# Patient Record
Sex: MALE | Race: WHITE | NOT HISPANIC OR LATINO | URBAN - METROPOLITAN AREA
[De-identification: names, ages, dates, MRNs, and addresses within clinical notes are randomized per-mention and may not be internally consistent; named-entity substitution may affect disease eponyms.]

---

## 2017-08-29 ENCOUNTER — OUTPATIENT (OUTPATIENT)
Dept: OUTPATIENT SERVICES | Facility: HOSPITAL | Age: 62
LOS: 1 days | Discharge: HOME | End: 2017-08-29
Payer: COMMERCIAL

## 2017-08-29 VITALS
HEIGHT: 70 IN | RESPIRATION RATE: 16 BRPM | TEMPERATURE: 97 F | HEART RATE: 70 BPM | OXYGEN SATURATION: 97 % | WEIGHT: 294.98 LBS | SYSTOLIC BLOOD PRESSURE: 144 MMHG | DIASTOLIC BLOOD PRESSURE: 72 MMHG

## 2017-08-29 VITALS
RESPIRATION RATE: 16 BRPM | SYSTOLIC BLOOD PRESSURE: 110 MMHG | DIASTOLIC BLOOD PRESSURE: 58 MMHG | HEART RATE: 68 BPM | OXYGEN SATURATION: 97 %

## 2017-08-29 DIAGNOSIS — Z90.49 ACQUIRED ABSENCE OF OTHER SPECIFIED PARTS OF DIGESTIVE TRACT: Chronic | ICD-10-CM

## 2017-08-29 DIAGNOSIS — Z98.890 OTHER SPECIFIED POSTPROCEDURAL STATES: Chronic | ICD-10-CM

## 2017-08-29 PROCEDURE — 93458 L HRT ARTERY/VENTRICLE ANGIO: CPT | Mod: 26

## 2017-08-29 NOTE — H&P CARDIOLOGY - HISTORY OF PRESENT ILLNESS
62 year old male with PMHx HTN, HLD, MI, PCI of RCA 2008 presented to cardiologist with MOTT- working [place]-, fatigue. Stress test revealed 62 year old male with PMHx HTN, HLD, former smoker, MI, PCI of RCA 2008 presented to cardiologist for routine visit. Reports MOTT- walking [place]- and fatigue. Dyspnea during  Nuclear stress test which revealed reversible defect of the inferior wall consistent with ischemia

## 2017-08-29 NOTE — PROGRESS NOTE ADULT - SUBJECTIVE AND OBJECTIVE BOX
Post cardiac cath NP note    s/p LHC revealing non obstructive CAD    Patient feels well.  Denies chest pain, shortness of breath, dizziness or palpitations at this time    Right radial hemoband in place.  Procedure site CDI, no bleeding, no hematoma, able to move all digits with capillary refill < 3 seconds, fingers warm      HPI:  62 year old male with PMHx HTN, HLD, former smoker, MI, PCI of RCA 2008 presented to cardiologist for routine visit. Reports MOTT- walking [place]- and fatigue. Dyspnea during  Nuclear stress test which revealed reversible defect of the inferior wall consistent with ischemia     now s/p LHC revealing non obstructive CAD and patent stent    --vital signs, diet and activity per post procedure orders  -ambulae ad darrion post bedrest  -encourage PO fluids  -plan of care discussed with patient, family and MD  -d/c after bedrest if stable  -post procedure and d/c instructions reviewed  -follow up with MD in 1-2 weeks  -Discussed therapeutic lifestyle changes to reduce risk factors such as following a cardiac diet, weight loss, maintaining a healthy weight, exercise, smoking cessation, medication compliance, and regular follow-up  with MD Post cardiac cath NP note    s/p LHC revealing non obstructive CAD    Patient feels well.  Denies chest pain, shortness of breath, dizziness or palpitations at this time    Right radial hemoband removed.  Procedure site CDI, no bleeding, no hematoma, able to move all digits with capillary refill < 2 seconds, fingers warm      HPI:  62 year old male with PMHx HTN, HLD, former smoker, MI, PCI of RCA 2008 presented to cardiologist for routine visit. Reports MOTT- walking [place]- and fatigue. Dyspnea during  Nuclear stress test which revealed reversible defect of the inferior wall consistent with ischemia     now s/p LHC revealing non obstructive CAD and patent stent    --vital signs, diet and activity per post procedure orders  -ambulae ad darrion post bedrest  -encourage PO fluids  -plan of care discussed with patient, family and MD  -d/c after bedrest if stable  -post procedure and d/c instructions reviewed  -follow up with MD in 1-2 weeks  -Discussed therapeutic lifestyle changes to reduce risk factors such as following a cardiac diet, weight loss, maintaining a healthy weight, exercise, smoking cessation, medication compliance, and regular follow-up  with MD

## 2017-08-29 NOTE — H&P CARDIOLOGY - PMH
CAD (coronary artery disease)    HLD (hyperlipidemia)    HTN (hypertension)    MI, old CAD (coronary artery disease)    HLD (hyperlipidemia)    HTN (hypertension)    MI, old    LEA (obstructive sleep apnea)

## 2017-08-29 NOTE — ASU PATIENT PROFILE, ADULT - PSH
H/O Achilles tendon repair    History of cholecystectomy    History of percutaneous coronary intervention  RCA stent 2008

## 2017-08-29 NOTE — PACU DISCHARGE NOTE - COMMENTS
Pt received instructions on medications, activity, follow up and signs and symptoms to report.  Pt and his wife verbalizes understanding. m/l removed. site clear.  bulky dressing intact. no bleeding or hematoma.  Pt left ambulating with his wife and cl staff by his side. No c/o offered.

## 2017-09-06 DIAGNOSIS — I25.2 OLD MYOCARDIAL INFARCTION: ICD-10-CM

## 2017-09-06 DIAGNOSIS — I10 ESSENTIAL (PRIMARY) HYPERTENSION: ICD-10-CM

## 2017-09-06 DIAGNOSIS — R94.39 ABNORMAL RESULT OF OTHER CARDIOVASCULAR FUNCTION STUDY: ICD-10-CM

## 2017-09-06 DIAGNOSIS — E78.5 HYPERLIPIDEMIA, UNSPECIFIED: ICD-10-CM

## 2017-09-06 DIAGNOSIS — Z87.891 PERSONAL HISTORY OF NICOTINE DEPENDENCE: ICD-10-CM

## 2017-09-06 DIAGNOSIS — G47.33 OBSTRUCTIVE SLEEP APNEA (ADULT) (PEDIATRIC): ICD-10-CM

## 2017-09-06 DIAGNOSIS — Z90.49 ACQUIRED ABSENCE OF OTHER SPECIFIED PARTS OF DIGESTIVE TRACT: ICD-10-CM

## 2017-09-06 DIAGNOSIS — I25.10 ATHEROSCLEROTIC HEART DISEASE OF NATIVE CORONARY ARTERY WITHOUT ANGINA PECTORIS: ICD-10-CM

## 2017-09-06 DIAGNOSIS — Z79.82 LONG TERM (CURRENT) USE OF ASPIRIN: ICD-10-CM

## 2018-01-12 ENCOUNTER — OUTPATIENT (OUTPATIENT)
Dept: OUTPATIENT SERVICES | Facility: HOSPITAL | Age: 63
LOS: 1 days | Discharge: HOME | End: 2018-01-12

## 2018-01-12 VITALS
OXYGEN SATURATION: 96 % | WEIGHT: 299.83 LBS | DIASTOLIC BLOOD PRESSURE: 87 MMHG | TEMPERATURE: 98 F | RESPIRATION RATE: 16 BRPM | HEIGHT: 70 IN | SYSTOLIC BLOOD PRESSURE: 155 MMHG | HEART RATE: 70 BPM

## 2018-01-12 DIAGNOSIS — Z98.890 OTHER SPECIFIED POSTPROCEDURAL STATES: Chronic | ICD-10-CM

## 2018-01-12 DIAGNOSIS — Z90.49 ACQUIRED ABSENCE OF OTHER SPECIFIED PARTS OF DIGESTIVE TRACT: Chronic | ICD-10-CM

## 2018-01-12 NOTE — ASU PATIENT PROFILE, ADULT - PMH
CAD (coronary artery disease)    HLD (hyperlipidemia)    HTN (hypertension)    MI, old    LEA (obstructive sleep apnea)

## 2018-01-16 DIAGNOSIS — Z09 ENCOUNTER FOR FOLLOW-UP EXAMINATION AFTER COMPLETED TREATMENT FOR CONDITIONS OTHER THAN MALIGNANT NEOPLASM: ICD-10-CM

## 2018-01-16 DIAGNOSIS — Z87.891 PERSONAL HISTORY OF NICOTINE DEPENDENCE: ICD-10-CM

## 2018-01-16 DIAGNOSIS — I25.2 OLD MYOCARDIAL INFARCTION: ICD-10-CM

## 2018-01-16 DIAGNOSIS — I10 ESSENTIAL (PRIMARY) HYPERTENSION: ICD-10-CM

## 2018-01-16 DIAGNOSIS — K57.30 DIVERTICULOSIS OF LARGE INTESTINE WITHOUT PERFORATION OR ABSCESS WITHOUT BLEEDING: ICD-10-CM

## 2018-01-16 DIAGNOSIS — E78.5 HYPERLIPIDEMIA, UNSPECIFIED: ICD-10-CM

## 2018-01-16 DIAGNOSIS — K64.8 OTHER HEMORRHOIDS: ICD-10-CM

## 2018-01-16 DIAGNOSIS — G47.33 OBSTRUCTIVE SLEEP APNEA (ADULT) (PEDIATRIC): ICD-10-CM

## 2018-01-16 DIAGNOSIS — Z90.49 ACQUIRED ABSENCE OF OTHER SPECIFIED PARTS OF DIGESTIVE TRACT: ICD-10-CM

## 2018-01-16 DIAGNOSIS — Z86.010 PERSONAL HISTORY OF COLONIC POLYPS: ICD-10-CM

## 2018-01-16 DIAGNOSIS — E66.01 MORBID (SEVERE) OBESITY DUE TO EXCESS CALORIES: ICD-10-CM

## 2018-10-01 NOTE — ASU PREOP CHECKLIST - HEIGHT IN INCHES
[FreeTextEntry1] : Health care maintenance:\par check blood work, agrees for STD testing, advised to see an outside provider if he wishes for anal or oral swabs for HPV\par follow up with optometry/ophthalmology and dentist for routine exams\par c/w diet and exercise as tolerated\par Flu vaccine given, patient tolerated well \par \par Asthma: \par stable, c/w Albuterol inhaler as directed\par \par Dermatitis:\par see dermatology\par \par Vitamin D deficiency:\par check blood work\par 
10

## 2019-10-26 ENCOUNTER — INPATIENT (INPATIENT)
Facility: HOSPITAL | Age: 64
LOS: 1 days | Discharge: HOME | DRG: 299 | End: 2019-10-28
Attending: FAMILY MEDICINE | Admitting: FAMILY MEDICINE
Payer: COMMERCIAL

## 2019-10-26 VITALS — WEIGHT: 278 LBS | HEIGHT: 70 IN

## 2019-10-26 DIAGNOSIS — Z90.49 ACQUIRED ABSENCE OF OTHER SPECIFIED PARTS OF DIGESTIVE TRACT: ICD-10-CM

## 2019-10-26 DIAGNOSIS — Z90.49 ACQUIRED ABSENCE OF OTHER SPECIFIED PARTS OF DIGESTIVE TRACT: Chronic | ICD-10-CM

## 2019-10-26 DIAGNOSIS — Z98.890 OTHER SPECIFIED POSTPROCEDURAL STATES: Chronic | ICD-10-CM

## 2019-10-26 DIAGNOSIS — I26.99 OTHER PULMONARY EMBOLISM WITHOUT ACUTE COR PULMONALE: ICD-10-CM

## 2019-10-26 PROBLEM — I25.10 ATHEROSCLEROTIC HEART DISEASE OF NATIVE CORONARY ARTERY WITHOUT ANGINA PECTORIS: Chronic | Status: ACTIVE | Noted: 2017-08-29

## 2019-10-26 PROBLEM — E78.5 HYPERLIPIDEMIA, UNSPECIFIED: Chronic | Status: ACTIVE | Noted: 2017-08-29

## 2019-10-26 PROBLEM — I10 ESSENTIAL (PRIMARY) HYPERTENSION: Chronic | Status: ACTIVE | Noted: 2017-08-29

## 2019-10-26 LAB
ALBUMIN SERPL ELPH-MCNC: 4.1 G/DL — SIGNIFICANT CHANGE UP (ref 3.3–5)
ALP SERPL-CCNC: 68 U/L — SIGNIFICANT CHANGE UP (ref 40–120)
ALT FLD-CCNC: 57 U/L — SIGNIFICANT CHANGE UP (ref 12–78)
ANION GAP SERPL CALC-SCNC: 11 MMOL/L — SIGNIFICANT CHANGE UP (ref 5–17)
APTT BLD: 23.9 SEC — LOW (ref 27.5–36.3)
AST SERPL-CCNC: 18 U/L — SIGNIFICANT CHANGE UP (ref 15–37)
BILIRUB SERPL-MCNC: 2.3 MG/DL — HIGH (ref 0.2–1.2)
BUN SERPL-MCNC: 30 MG/DL — HIGH (ref 7–23)
CALCIUM SERPL-MCNC: 9.3 MG/DL — SIGNIFICANT CHANGE UP (ref 8.5–10.1)
CHLORIDE SERPL-SCNC: 101 MMOL/L — SIGNIFICANT CHANGE UP (ref 96–108)
CO2 SERPL-SCNC: 25 MMOL/L — SIGNIFICANT CHANGE UP (ref 22–31)
CREAT SERPL-MCNC: 1.01 MG/DL — SIGNIFICANT CHANGE UP (ref 0.5–1.3)
GLUCOSE SERPL-MCNC: 137 MG/DL — HIGH (ref 70–99)
HCT VFR BLD CALC: 48.1 % — SIGNIFICANT CHANGE UP (ref 39–50)
HGB BLD-MCNC: 16.9 G/DL — SIGNIFICANT CHANGE UP (ref 13–17)
INR BLD: 1.03 RATIO — SIGNIFICANT CHANGE UP (ref 0.88–1.16)
MCHC RBC-ENTMCNC: 31.1 PG — SIGNIFICANT CHANGE UP (ref 27–34)
MCHC RBC-ENTMCNC: 35.1 GM/DL — SIGNIFICANT CHANGE UP (ref 32–36)
MCV RBC AUTO: 88.4 FL — SIGNIFICANT CHANGE UP (ref 80–100)
NT-PROBNP SERPL-SCNC: 54 PG/ML — SIGNIFICANT CHANGE UP (ref 0–125)
PLATELET # BLD AUTO: 239 K/UL — SIGNIFICANT CHANGE UP (ref 150–400)
POTASSIUM SERPL-MCNC: 3.8 MMOL/L — SIGNIFICANT CHANGE UP (ref 3.5–5.3)
POTASSIUM SERPL-SCNC: 3.8 MMOL/L — SIGNIFICANT CHANGE UP (ref 3.5–5.3)
PROT SERPL-MCNC: 7.5 GM/DL — SIGNIFICANT CHANGE UP (ref 6–8.3)
PROTHROM AB SERPL-ACNC: 11.4 SEC — SIGNIFICANT CHANGE UP (ref 10–12.9)
RBC # BLD: 5.44 M/UL — SIGNIFICANT CHANGE UP (ref 4.2–5.8)
RBC # FLD: 12.8 % — SIGNIFICANT CHANGE UP (ref 10.3–14.5)
SODIUM SERPL-SCNC: 137 MMOL/L — SIGNIFICANT CHANGE UP (ref 135–145)
TROPONIN I SERPL-MCNC: <0.015 NG/ML — SIGNIFICANT CHANGE UP (ref 0.01–0.04)
TROPONIN I SERPL-MCNC: <0.015 NG/ML — SIGNIFICANT CHANGE UP (ref 0.01–0.04)
WBC # BLD: 14.19 K/UL — HIGH (ref 3.8–10.5)
WBC # FLD AUTO: 14.19 K/UL — HIGH (ref 3.8–10.5)

## 2019-10-26 PROCEDURE — 71275 CT ANGIOGRAPHY CHEST: CPT

## 2019-10-26 PROCEDURE — 93970 EXTREMITY STUDY: CPT

## 2019-10-26 PROCEDURE — 80048 BASIC METABOLIC PNL TOTAL CA: CPT

## 2019-10-26 PROCEDURE — 93306 TTE W/DOPPLER COMPLETE: CPT

## 2019-10-26 PROCEDURE — 36415 COLL VENOUS BLD VENIPUNCTURE: CPT

## 2019-10-26 PROCEDURE — 84100 ASSAY OF PHOSPHORUS: CPT

## 2019-10-26 PROCEDURE — 82962 GLUCOSE BLOOD TEST: CPT

## 2019-10-26 PROCEDURE — 85027 COMPLETE CBC AUTOMATED: CPT

## 2019-10-26 PROCEDURE — 86803 HEPATITIS C AB TEST: CPT

## 2019-10-26 PROCEDURE — 83735 ASSAY OF MAGNESIUM: CPT

## 2019-10-26 PROCEDURE — 93010 ELECTROCARDIOGRAM REPORT: CPT

## 2019-10-26 PROCEDURE — 85014 HEMATOCRIT: CPT

## 2019-10-26 PROCEDURE — 93005 ELECTROCARDIOGRAM TRACING: CPT

## 2019-10-26 PROCEDURE — 80053 COMPREHEN METABOLIC PANEL: CPT

## 2019-10-26 PROCEDURE — 85018 HEMOGLOBIN: CPT

## 2019-10-26 PROCEDURE — 71046 X-RAY EXAM CHEST 2 VIEWS: CPT | Mod: 26

## 2019-10-26 PROCEDURE — 71275 CT ANGIOGRAPHY CHEST: CPT | Mod: 26

## 2019-10-26 RX ORDER — INSULIN LISPRO 100/ML
VIAL (ML) SUBCUTANEOUS
Refills: 0 | Status: DISCONTINUED | OUTPATIENT
Start: 2019-10-26 | End: 2019-10-28

## 2019-10-26 RX ORDER — ASPIRIN/CALCIUM CARB/MAGNESIUM 324 MG
324 TABLET ORAL ONCE
Refills: 0 | Status: COMPLETED | OUTPATIENT
Start: 2019-10-26 | End: 2019-10-26

## 2019-10-26 RX ORDER — DEXTROSE 50 % IN WATER 50 %
15 SYRINGE (ML) INTRAVENOUS ONCE
Refills: 0 | Status: DISCONTINUED | OUTPATIENT
Start: 2019-10-26 | End: 2019-10-28

## 2019-10-26 RX ORDER — ENOXAPARIN SODIUM 100 MG/ML
125 INJECTION SUBCUTANEOUS
Refills: 0 | Status: DISCONTINUED | OUTPATIENT
Start: 2019-10-26 | End: 2019-10-28

## 2019-10-26 RX ORDER — SIMVASTATIN 20 MG/1
40 TABLET, FILM COATED ORAL AT BEDTIME
Refills: 0 | Status: DISCONTINUED | OUTPATIENT
Start: 2019-10-26 | End: 2019-10-28

## 2019-10-26 RX ORDER — ASPIRIN/CALCIUM CARB/MAGNESIUM 324 MG
325 TABLET ORAL DAILY
Refills: 0 | Status: DISCONTINUED | OUTPATIENT
Start: 2019-10-26 | End: 2019-10-28

## 2019-10-26 RX ORDER — DEXTROSE 10 % IN WATER 10 %
125 INTRAVENOUS SOLUTION INTRAVENOUS ONCE
Refills: 0 | Status: DISCONTINUED | OUTPATIENT
Start: 2019-10-26 | End: 2019-10-28

## 2019-10-26 RX ORDER — TRIAMTERENE/HYDROCHLOROTHIAZID 75 MG-50MG
1 TABLET ORAL DAILY
Refills: 0 | Status: DISCONTINUED | OUTPATIENT
Start: 2019-10-26 | End: 2019-10-28

## 2019-10-26 RX ORDER — METOPROLOL TARTRATE 50 MG
25 TABLET ORAL DAILY
Refills: 0 | Status: DISCONTINUED | OUTPATIENT
Start: 2019-10-26 | End: 2019-10-28

## 2019-10-26 RX ORDER — LATANOPROST 0.05 MG/ML
1 SOLUTION/ DROPS OPHTHALMIC; TOPICAL AT BEDTIME
Refills: 0 | Status: DISCONTINUED | OUTPATIENT
Start: 2019-10-26 | End: 2019-10-28

## 2019-10-26 RX ORDER — GLUCAGON INJECTION, SOLUTION 0.5 MG/.1ML
1 INJECTION, SOLUTION SUBCUTANEOUS ONCE
Refills: 0 | Status: DISCONTINUED | OUTPATIENT
Start: 2019-10-26 | End: 2019-10-28

## 2019-10-26 RX ORDER — ALLOPURINOL 300 MG
1 TABLET ORAL
Qty: 0 | Refills: 0 | DISCHARGE

## 2019-10-26 RX ORDER — INSULIN LISPRO 100/ML
VIAL (ML) SUBCUTANEOUS AT BEDTIME
Refills: 0 | Status: DISCONTINUED | OUTPATIENT
Start: 2019-10-26 | End: 2019-10-28

## 2019-10-26 RX ORDER — DEXTROSE 10 % IN WATER 10 %
250 INTRAVENOUS SOLUTION INTRAVENOUS ONCE
Refills: 0 | Status: DISCONTINUED | OUTPATIENT
Start: 2019-10-26 | End: 2019-10-28

## 2019-10-26 RX ORDER — SODIUM CHLORIDE 9 MG/ML
1000 INJECTION, SOLUTION INTRAVENOUS
Refills: 0 | Status: DISCONTINUED | OUTPATIENT
Start: 2019-10-26 | End: 2019-10-28

## 2019-10-26 RX ADMIN — LATANOPROST 1 DROP(S): 0.05 SOLUTION/ DROPS OPHTHALMIC; TOPICAL at 23:06

## 2019-10-26 RX ADMIN — ENOXAPARIN SODIUM 125 MILLIGRAM(S): 100 INJECTION SUBCUTANEOUS at 17:57

## 2019-10-26 NOTE — ED ADULT NURSE REASSESSMENT NOTE - NS ED NURSE REASSESS COMMENT FT1
Dr. Jose reassessed pt at bedside. pt ambulating steadily in ED at this time. denies CP denies SOB after 2 laps around ED.
Report received from previous RN, patient waiting for hospitalist for admission orders.  Appears comfortable.  Will continue to monitor.
Report received from previous RN, patient with wife at bedside attending to skin tear on right shin.  1 unit PRBC's infusing along with protonix drip.  Will continue to monitor.
Spoke with Dr Bautista, goal oxygen saturation is 90-95%, will titrate oxygen down as tolerated. Will update patient on plan of care.
after ambulation trial; pt became dyspneic off O2. O2 sat decreased to 88 on room air. pt placed back in bed on O2 as ordered. pt denies CP. Awaiting CT.
Assumed care of patient from JESUS Ramachandran at 2040. Patient AxOx4, Dr Bautista at beside. Patient resting comfortably in bed. Patient in no acute signs of distress. All needs addressed at this time. Safety and comfort maintained. Will continue to monitor.

## 2019-10-26 NOTE — H&P ADULT - NSICDXPASTMEDICALHX_GEN_ALL_CORE_FT
PAST MEDICAL HISTORY:  CAD (coronary artery disease)     Demyelinating neuropathy     HLD (hyperlipidemia)     HTN (hypertension)     MI, old     LEA (obstructive sleep apnea)

## 2019-10-26 NOTE — ED ADULT NURSE NOTE - CHPI ED NUR SYMPTOMS NEG
no tingling/no fever/no nausea/no pain/no weakness/no chills/no dizziness/no decreased eating/drinking/no vomiting

## 2019-10-26 NOTE — H&P ADULT - NSICDXPASTSURGICALHX_GEN_ALL_CORE_FT
PAST SURGICAL HISTORY:  H/O Achilles tendon repair     History of cholecystectomy     History of percutaneous coronary intervention RCA stent 2008

## 2019-10-26 NOTE — ED ADULT TRIAGE NOTE - CHIEF COMPLAINT QUOTE
Pt reports that over the past week or so he has been experiencing dyspnea with exertion such as climbing stairs, which is not his normal. Pt denies any SOB at rest. Denies chest pain, fever, weakness.

## 2019-10-26 NOTE — H&P ADULT - NSHPOUTPATIENTPROVIDERS_GEN_ALL_CORE
[facility]/Cardio- Dr. Villafana. PMD/Cardio- Dr. Villafana.  Neurology Dr. Kiara Barahona, [place]  Ortho Dr. Bailey

## 2019-10-26 NOTE — H&P ADULT - ASSESSMENT
Pt is admitted w/  Bilat PE's  HYpoxia to 80s  with air hunger on ambulation  DM  - anticoagulation with lovenox in the ED  - O2 support  -   - DVT prophylaxis : on lovenox for PE  - IMPROVE VTE Individual Risk Assessment    RISK                                                                Points    [  ] Previous VTE                                                  3    [  X] Thrombophilia                                               2    [  ] Lower limb paralysis                                      2        (unable to hold up >15 seconds)      [  ] Current Cancer                                              2         (within 6 months)    [  ] Immobilization > 24 hrs                                1    [  ] ICU/CCU stay > 24 hours                              1    [X  ] Age > 60                                                      1    IMPROVE VTE Score ______>3___    IMPROVE Score 0-1: Low Risk, No VTE prophylaxis required for most patients, encourage ambulation.   IMPROVE Score 2-3: At risk, pharmacologic VTE prophylaxis is indicated for most patients (in the absence of a contraindication)  IMPROVE Score > or = 4: High Risk, pharmacologic VTE prophylaxis is indicated for most patients (in the absence of a contraindication) Pt is a  63 y/o M on intermittent steroids for demyelinating neuropathy admitted w/  Bilat PE's, increasing MOTT  HYpoxia to 80s  with air hunger on ambulation  DM,  Hg A1C 6.2 3-4 mo ago  Sleep Apnea  - anticoagulation with lovenox in the ED  - O2 support and CPAP ovenight  - echocardiogram to eval for R vent strain  - dupplex bilat LE  - cardiology consult  - outpt f/u with oncology  - Insulin sliding scale, hold metformin tonight  - DVT prophylaxis : on lovenox for PE  - IMPROVE VTE Individual Risk Assessment    RISK                                                                Points    [  ] Previous VTE                                                  3    [  X] Thrombophilia                                               2    [  ] Lower limb paralysis                                      2        (unable to hold up >15 seconds)      [  ] Current Cancer                                              2         (within 6 months)    [  ] Immobilization > 24 hrs                                1    [  ] ICU/CCU stay > 24 hours                              1    [X  ] Age > 60                                                      1    IMPROVE VTE Score ______>3___    IMPROVE Score 0-1: Low Risk, No VTE prophylaxis required for most patients, encourage ambulation.   IMPROVE Score 2-3: At risk, pharmacologic VTE prophylaxis is indicated for most patients (in the absence of a contraindication)  IMPROVE Score > or = 4: High Risk, pharmacologic VTE prophylaxis is indicated for most patients (in the absence of a contraindication) Pt is a  65 y/o M with CAD, DM II, HTN, HLD on intermittent steroids for demyelinating neuropathy of lower extr admitted w/  Bilat PE's, increasing MOTT  HYpoxia to 80s  with air hunger on ambulation  DM,  Hg A1C 6.2 3-4 mo ago  Sleep Apnea on CPAP at home  - anticoagulation with started with lovenox in the ED  - O2 support and CPAP ovenight  - echocardiogram to eval for R vent strain  - dupplex bilat LE  - cardiology consult  - outpt f/u with oncology  - Insulin sliding scale, hold metformin tonight  - DVT prophylaxis : on lovenox for PE  - IMPROVE VTE Individual Risk Assessment    RISK                                                                Points    [  ] Previous VTE                                                  3    [  X] Thrombophilia                                               2    [  ] Lower limb paralysis                                      2        (unable to hold up >15 seconds)      [  ] Current Cancer                                              2         (within 6 months)    [  ] Immobilization > 24 hrs                                1    [  ] ICU/CCU stay > 24 hours                              1    [X  ] Age > 60                                                      1    IMPROVE VTE Score ______>3___    IMPROVE Score 0-1: Low Risk, No VTE prophylaxis required for most patients, encourage ambulation.   IMPROVE Score 2-3: At risk, pharmacologic VTE prophylaxis is indicated for most patients (in the absence of a contraindication)  IMPROVE Score > or = 4: High Risk, pharmacologic VTE prophylaxis is indicated for most patients (in the absence of a contraindication)

## 2019-10-26 NOTE — ED PROVIDER NOTE - OBJECTIVE STATEMENT
65 y/o male with a PMHx of MI, CAD, HTN, HLD, LEA on CPAP, demyelinating neuropathy on steroids presents to the ED c/o MOTT x1 week with walking up 1 flight of stairs, worsening since onset. No SOB with resting. Denies CP, LE swelling, orthopnea, cough, fever, decreased appetite. Pt states that today he was at rest and felt "flush" so came into ED. Pt with demyelinating neuropathy, takes steroids 4 days on, 24 days off. Pt reports he just finished burst of steroids. Had regular physical 3 week ago which was normal, did not have SOB at that time. PMD/Cardio- Dr. Villafana.

## 2019-10-26 NOTE — ED STATDOCS - PROGRESS NOTE DETAILS
63 y/o M with a PMHx of CAD, HLD, HTN, LEA, MI presenting to the ED c/o dyspnea on exertion. +lethargic Patient reports that he has been having SOB, x days, and says that he woke up this morning, and was reported to be flushed, and came to the ED for further evaluation. Patient had past MI when he was in his 30s. Patient is currently on steroids prescribed by his PCD. Denies any CP, cough, swelling of legs, or fever. Patient will be sent to [facility] for further evaluation.

## 2019-10-26 NOTE — H&P ADULT - NSHPPHYSICALEXAM_GEN_ALL_CORE
ICU Vital Signs Last 24 Hrs    T(F): 97.7 (26 Oct 2019 14:55), Max: 97.9 (26 Oct 2019 12:42)  HR: 56 (26 Oct 2019 14:55) (56 - 62)  BP: 125/73 (26 Oct 2019 14:55) (125/73 - 137/86)    RR: 15 (26 Oct 2019 14:55) (15 - 19)  SpO2: 100% (26 Oct 2019 14:55) (94% - 100%)

## 2019-10-26 NOTE — H&P ADULT - NSHPSOCIALHISTORY_GEN_ALL_CORE
Pt lives with his wife.  He is retired from working in computer programming.  Pt is a former 12 pack year smoker and smoked a cigar/day until age 40.  He reports an occasional beer per week.  Pt denies drug use.

## 2019-10-26 NOTE — H&P ADULT - HISTORY OF PRESENT ILLNESS
63 y/o male with a PMHx of MI, CAD, HTN, HLD, LEA on CPAP, demyelinating neuropathy on steroids presents to the ED c/o MOTT x1 week with walking up 1 flight of stairs, worsening since onset. No SOB with resting. Denies CP, LE swelling, orthopnea, cough, fever, decreased appetite. Pt states that today he was at rest and felt "flush" so came into ED. Pt with demyelinating neuropathy, takes steroids 4 days on, 24 days off. Pt reports he just finished burst of steroids. Had regular physical 3 week ago which was normal, did not have SOB at that time. 63 y/o male with a PMHx of MI, CAD, HTN, HLD, LEA on CPAP, demyelinating neuropathy on intermittent steroids presents to the ED c/o MOTT for 5 days with walking up 1 flight of stairs, worsening since onset. No SOB with resting. Denies CP, LE swelling, orthopnea, cough, fever, decreased appetite. Pt states that today he was at rest and felt "flush" so came into ED. Pt with demyelinating neuropathy, takes steroids 4 days on, 24 days off. Pt reports he just finished a 4 day burst of steroids on Oct 23rd. Had regular physical 3 week ago which was normal, did not have SOB at that time.  Pt denies recent plan travel or long journeys.      Fam Hx:  Mother DVT and PE  Brother DVT  Sister DVT 65 y/o male with a PMHx of MI, CAD, HTN, HLD, LEA on CPAP, demyelinating neuropathy on intermittent steroids presents to the ED c/o MOTT for 5 days with walking up 1 flight of stairs, worsening since onset. No SOB with resting. Denies CP, LE swelling, orthopnea, cough, fever, decreased appetite. Pt states that today he was at rest and felt "flush" so came into ED. Pt with demyelinating neuropathy, takes steroids 4 days on, 24 days off. Pt reports he just finished a 4 day burst of steroids on Oct 23rd. Had regular physical exam 3 week ago which was normal, did not have SOB at that time. Pt had recent carpel tunnel surg for the RUE 2 wks ago.      Pt denies recent plan travel or long journeys.    No cpain, n,v,d. No LOC. No fever/chills. He denies URI, UTI, abd complaints.    Fam Hx:  Mother DVT and PE  Brother DVT  Sister DVT

## 2019-10-26 NOTE — ED PROVIDER NOTE - CLINICAL SUMMARY MEDICAL DECISION MAKING FREE TEXT BOX
63 y/o male h/o HTN, HLD, CAD s/o MI in his 30s LEA on CPAP QHS ambulatory to ED c/o MOTT worsening over past week. No CP, fever, chills, cough, LE edema. Pt felt flushed this morning. Pt nontoxic on exam. Plan: CXR, EKG, labs BNP, serial trop, monitor, observe reassess. Discuss with Dr. Villafana.

## 2019-10-26 NOTE — ED PROVIDER NOTE - CARE PLAN
Principal Discharge DX:	MOTT (dyspnea on exertion)  Secondary Diagnosis:	CAD (coronary artery disease) Principal Discharge DX:	MOTT (dyspnea on exertion)  Secondary Diagnosis:	CAD (coronary artery disease)  Secondary Diagnosis:	Hypoxia

## 2019-10-26 NOTE — ED PROVIDER NOTE - CONSTITUTIONAL, MLM
normal... Older white male.  Overweight. Well appearing, well nourished, awake, alert, oriented to person, place, time/situation and in no apparent distress. No respiratory distress, no sentence shortening Nontoxic.

## 2019-10-26 NOTE — ED PROVIDER NOTE - PROGRESS NOTE DETAILS
Dr. Jose:  kinjal Villafana Dr. Jose:  Cardiology/[facility] paged again.  Troponin #2 drawn, Dr. Montoya to follow result. Dr. Jose:  Informed by ED RN that pt failed ambulation trial:  + gasping for air during short walk < 20 ft., + desaturated down into 80s, no associated chest pain.  Dr. Navarro, admitting hospitalist, aware of Tele admission.  No response yet from Cardiology. Dr. Jose:  Dr. Montoya aware to f/u CTA chest report.

## 2019-10-26 NOTE — H&P ADULT - NSHPLABSRESULTS_GEN_ALL_CORE
EKG: NSR 60 bpm, T wave inv in II, III, avF (seen on March 2008 EKG also),  T wave flattening in v5v6(similar in March 2008)

## 2019-10-27 DIAGNOSIS — G47.33 OBSTRUCTIVE SLEEP APNEA (ADULT) (PEDIATRIC): ICD-10-CM

## 2019-10-27 DIAGNOSIS — R06.09 OTHER FORMS OF DYSPNEA: ICD-10-CM

## 2019-10-27 DIAGNOSIS — I26.99 OTHER PULMONARY EMBOLISM WITHOUT ACUTE COR PULMONALE: ICD-10-CM

## 2019-10-27 DIAGNOSIS — I10 ESSENTIAL (PRIMARY) HYPERTENSION: ICD-10-CM

## 2019-10-27 DIAGNOSIS — R09.02 HYPOXEMIA: ICD-10-CM

## 2019-10-27 DIAGNOSIS — I82.403 ACUTE EMBOLISM AND THROMBOSIS OF UNSPECIFIED DEEP VEINS OF LOWER EXTREMITY, BILATERAL: ICD-10-CM

## 2019-10-27 LAB
ANION GAP SERPL CALC-SCNC: 8 MMOL/L — SIGNIFICANT CHANGE UP (ref 5–17)
BUN SERPL-MCNC: 28 MG/DL — HIGH (ref 7–23)
CALCIUM SERPL-MCNC: 8.5 MG/DL — SIGNIFICANT CHANGE UP (ref 8.5–10.1)
CHLORIDE SERPL-SCNC: 101 MMOL/L — SIGNIFICANT CHANGE UP (ref 96–108)
CO2 SERPL-SCNC: 27 MMOL/L — SIGNIFICANT CHANGE UP (ref 22–31)
CREAT SERPL-MCNC: 0.86 MG/DL — SIGNIFICANT CHANGE UP (ref 0.5–1.3)
GLUCOSE SERPL-MCNC: 125 MG/DL — HIGH (ref 70–99)
HCT VFR BLD CALC: 45.1 % — SIGNIFICANT CHANGE UP (ref 39–50)
HCV AB S/CO SERPL IA: 0.23 S/CO — SIGNIFICANT CHANGE UP (ref 0–0.99)
HCV AB SERPL-IMP: SIGNIFICANT CHANGE UP
HGB BLD-MCNC: 15.5 G/DL — SIGNIFICANT CHANGE UP (ref 13–17)
POTASSIUM SERPL-MCNC: 3.9 MMOL/L — SIGNIFICANT CHANGE UP (ref 3.5–5.3)
POTASSIUM SERPL-SCNC: 3.9 MMOL/L — SIGNIFICANT CHANGE UP (ref 3.5–5.3)
SODIUM SERPL-SCNC: 136 MMOL/L — SIGNIFICANT CHANGE UP (ref 135–145)

## 2019-10-27 PROCEDURE — 93970 EXTREMITY STUDY: CPT | Mod: 26

## 2019-10-27 PROCEDURE — 93010 ELECTROCARDIOGRAM REPORT: CPT

## 2019-10-27 RX ORDER — RIVAROXABAN 15 MG-20MG
1 KIT ORAL
Qty: 30 | Refills: 0
Start: 2019-10-27 | End: 2019-11-25

## 2019-10-27 RX ORDER — RIVAROXABAN 15 MG-20MG
1 KIT ORAL
Qty: 42 | Refills: 0
Start: 2019-10-27 | End: 2019-11-16

## 2019-10-27 RX ORDER — APIXABAN 2.5 MG/1
1 TABLET, FILM COATED ORAL
Qty: 60 | Refills: 0
Start: 2019-10-27 | End: 2019-11-25

## 2019-10-27 RX ADMIN — Medication 25 MILLIGRAM(S): at 12:18

## 2019-10-27 RX ADMIN — SIMVASTATIN 40 MILLIGRAM(S): 20 TABLET, FILM COATED ORAL at 21:35

## 2019-10-27 RX ADMIN — Medication 1 TABLET(S): at 12:19

## 2019-10-27 RX ADMIN — Medication 325 MILLIGRAM(S): at 12:18

## 2019-10-27 RX ADMIN — ENOXAPARIN SODIUM 125 MILLIGRAM(S): 100 INJECTION SUBCUTANEOUS at 17:46

## 2019-10-27 RX ADMIN — ENOXAPARIN SODIUM 125 MILLIGRAM(S): 100 INJECTION SUBCUTANEOUS at 06:36

## 2019-10-27 RX ADMIN — Medication 5 MILLIGRAM(S): at 06:36

## 2019-10-27 RX ADMIN — LATANOPROST 1 DROP(S): 0.05 SOLUTION/ DROPS OPHTHALMIC; TOPICAL at 21:35

## 2019-10-27 NOTE — CHART NOTE - NSCHARTNOTEFT_GEN_A_CORE
Pt seen and examined with house staff.  Plan formulated and reviewed on rounds     Briefly,  65 y/o male with strong FHx of VTE admitted with B/L PE--Hemodynamically stable--Trop negative and normal BNP--started on LMWH q12  LE dopp + for B/L DVT    Awake and alert  NAD  stable BP    Low risk PE    Full dose LMWH--will eventually transition to PO (warfarin vs DOAC)--will likely need life long AC  Not candidate for IVC filter  Check TTE    Above d/w pt and wife at bedside  monitor on tele

## 2019-10-27 NOTE — PROGRESS NOTE ADULT - ASSESSMENT
63 y/o M with CAD, DM II, HTN, HLD on intermittent steroids for demyelinating neuropathy of lower extr came to ED reporting MOTT  and admitted deut to erich PE.     # Bilateral PE w/ erich lower extremity DVT   - CT chest remarkable for bilateral lower lobe, right middle lobe and left upper lobe pulmonary emboli.   - US lower extremities remarkable for left leg thrombus identified in the popliteal vein extending into the posterior tibial trunk and peroneal veins. Right leg thrombus seen from the popliteal vein into the posterior tibial and gastrocnemius veins.   - Cardiology consult appreciated. Recommended to follow up echo and to continue with Xarelto when is ready to d/c.  - Continue in Lovenox 125 mg subq BID   - f/u ECHO results   - Continue with oxygen supplement   - F/U pulse oximeter  with and w/o ambulation   - F/U PT recommendations  - Elevate the legs   - Consider follow up outpatient with hemato oncology     # DM   -  1C 6.2 3-4 mo ago  - ISS  - Monitor blood sugar     Sleep Apnea   - Continue using CPAP at night   - anticoagulation with started with lovenox in the ED  - O2 support and CPAP ovenight  - echocardiogram to eval for R vent strain  - dupplex bilat LE  - cardiology consult  - outpt f/u with oncology  - Insulin sliding scale, hold metformin tonight  - DVT prophylaxis : on lovenox for PE  - IMPROVE VTE Individual Risk Assessment    RISK                                                                Points    [  ] Previous VTE                                                  3    [  X] Thrombophilia                                               2    [  ] Lower limb paralysis                                      2        (unable to hold up >15 seconds)      [  ] Current Cancer                                              2         (within 6 months)    [  ] Immobilization > 24 hrs                                1    [  ] ICU/CCU stay > 24 hours                              1    [X  ] Age > 60                                                      1    IMPROVE VTE Score ______>3___    IMPROVE Score 0-1: Low Risk, No VTE prophylaxis required for most patients, encourage ambulation.   IMPROVE Score 2-3: At risk, pharmacologic VTE prophylaxis is indicated for most patients (in the absence of a contraindication)  IMPROVE Score > or = 4: High Risk, pharmacologic VTE prophylaxis is indicated for most patients (in the absence of a contraindication) 63 y/o M with CAD, DM II, HTN, HLD on intermittent steroids for demyelinating neuropathy of lower extr came to ED reporting MOTT  and admitted deut to erich PE.     # Bilateral PE w/ erich lower extremity DVT   - CT chest remarkable for bilateral lower lobe, right middle lobe and left upper lobe pulmonary emboli.   - US lower extremities remarkable for left leg thrombus identified in the popliteal vein extending into the posterior tibial trunk and peroneal veins. Right leg thrombus seen from the popliteal vein into the posterior tibial and gastrocnemius veins.   - Cardiology consult appreciated. Recommended to follow up echo and to continue with Xarelto when is ready to d/c.  - Continue in Lovenox 125 mg subq BID   - f/u ECHO results   - Continue with oxygen supplement   - F/U pulse oximeter with and w/o ambulation   - F/U PT recommendations  - Elevate the legs   - Consider follow up outpatient with hemato oncology     # DM   - HA1c 6.2 3-4 mo ago  - ISS  - Monitor blood sugar     #Sleep Apnea   - Continue using CPAP at night     - DVT prophylaxis   on lovenox for PE  - IMPROVE VTE Individual Risk Assessment    RISK                                                                Points    [  ] Previous VTE                                                  3    [  X] Thrombophilia                                               2    [  ] Lower limb paralysis                                      2        (unable to hold up >15 seconds)      [  ] Current Cancer                                              2         (within 6 months)    [  ] Immobilization > 24 hrs                                1    [  ] ICU/CCU stay > 24 hours                              1    [X  ] Age > 60                                                      1    IMPROVE VTE Score ______>3___    IMPROVE Score 0-1: Low Risk, No VTE prophylaxis required for most patients, encourage ambulation.   IMPROVE Score 2-3: At risk, pharmacologic VTE prophylaxis is indicated for most patients (in the absence of a contraindication)  IMPROVE Score > or = 4: High Risk, pharmacologic VTE prophylaxis is indicated for most patients (in the absence of a contraindication) 63 y/o M with CAD, DM II, HTN, HLD on intermittent steroids for demyelinating neuropathy of lower extr came to ED reporting MOTT x5 days and admitted due to erich PE.     # Bilateral PE w/ erich lower extremity DVT   - CT chest remarkable for bilateral lower lobe, right middle lobe and left upper lobe pulmonary emboli.   - US lower extremities remarkable for left leg thrombus identified in the popliteal vein extending into the posterior tibial trunk and peroneal veins. Right leg thrombus seen from the popliteal vein into the posterior tibial and gastrocnemius veins.   - Cardiology consult appreciated. Recommended to follow up echo and to continue with Xarelto when is ready to d/c.  - Continue in Lovenox 125 mg subq BID   - f/u ECHO results   - Continue with oxygen supplement   - F/U pulse oximeter with and w/o ambulation   - F/U PT recommendations  - Elevate the legs   - Consider follow up outpatient with hemato oncology     # DM   - HA1c 6.2 3-4 mo ago  - ISS  - Monitor blood sugar     #Sleep Apnea   - Continue using CPAP at night     #DVT prophylaxis   - Continue on Lovenox    Case discussed with Dr. Shepherd

## 2019-10-27 NOTE — CONSULT NOTE ADULT - SUBJECTIVE AND OBJECTIVE BOX
History of Present Illness:  Reason for Admission: Bilateral PEs  HYpoxia to 80s  with air hunger on ambulation DM	  History of Present Illness: 	  65 y/o male with a PMHx of MI, CAD, HTN, HLD, LEA on CPAP, demyelinating neuropathy on intermittent steroids presents to the ED c/o MOTT for 5 days with walking up 1 flight of stairs, worsening since onset. No SOB with resting. Denies CP, LE swelling, orthopnea, cough, fever, decreased appetite. Pt states that today he was at rest and felt "flush" so came into ED. Pt with demyelinating neuropathy, takes steroids 4 days on, 24 days off. Pt reports he just finished a 4 day burst of steroids on Oct 23rd. Had regular physical exam 3 week ago which was normal, did not have SOB at that time. Pt had recent carpel tunnel surg for the RUE 2 wks ago.      Pt denies recent plan travel or long journeys.    No cpain, n,v,d. No LOC. No fever/chills. He denies URI, UTI, abd complaints.      Examined at bed side, feels fine today, states got out of bed to go to bathroom and was feeling ok.     Fam Hx:  Mother DVT and PE  Brother DVT  Sister DVT         Review of Systems:  Review of Systems: Pt reports hoarseness when he takes steroids.	  Other Review of Systems: All other review of systems negative, except as noted in HPI	      Allergies and Intolerances:        Allergies:  	No Known Allergies:     Home Medications:   * Patient Currently Takes Medications as of 26-Oct-2019 21:13 documented in Structured Notes  · 	aspirin 325 mg oral delayed release tablet: Last Dose Taken:  , 1 tab(s) orally once a day  · 	metoprolol succinate 25 mg oral tablet, extended release: Last Dose Taken:  , 1 tab(s) orally once a day  · 	ezetimibe-simvastatin 10 mg-40 mg oral tablet: Last Dose Taken:  , 1 tab(s) orally once a day  · 	enalapril 5 mg oral tablet: Last Dose Taken:  , 1 tab(s) orally once a day  · 	triamterene-hydrochlorothiazide 37.5mg-25mg oral capsule: Last Dose Taken:  , 1 cap(s) orally once a day (in the morning)  · 	Vitamin D2 2000 intl units oral capsule: Last Dose Taken:  , 1 cap(s) orally once a day  · 	Vitamin B-12 100 mcg oral tablet: Last Dose Taken:  , 1 tab(s) orally once a day  · 	metFORMIN 500 mg oral tablet: Last Dose Taken:  , 1 tab(s) orally 2 times a day  · 	dexamethasone 6 mg oral tablet: Last Dose Taken:  , 7 tablets (42 mg) by mouth daily for 4 days *repeat cycle every month *last taken from 10/21/19 to 10/24/19  · 	Xalatan 0.005% ophthalmic solution: 1 drop(s) to both eyes once a day (in the evening)    Patient History:    Past Medical, Past Surgical, and Family History:  PAST MEDICAL HISTORY:  CAD (coronary artery disease)     Demyelinating neuropathy     HLD (hyperlipidemia)     HTN (hypertension)     MI, old     LEA (obstructive sleep apnea).     PAST SURGICAL HISTORY:  H/O Achilles tendon repair     History of cholecystectomy     History of percutaneous coronary intervention RCA stent 2008.     Social History:  Social History (marital status, living situation, occupation, tobacco use, alcohol and drug use, and sexual history): Pt lives with his wife.  He is retired from working in computer programming.  Pt is a former 12 pack year smoker and smoked a cigar/day until age 40.  He reports an occasional beer per week.  Pt denies drug use.	     Tobacco Screening:  · Core Measure Site	Yes	  · Has the patient used tobacco in the past 30 days?	No	    Risk Assessment:    Present on Admission:  Deep Venous Thrombosis	no	  Pulmonary Embolus	yes	  Urinary Catheter	no	  Central Venous Catheter/PICC Line	no	  Surgical Site Incision	no	  Pressure Ulcer(s)	no	     Heart Failure:  Does this patient have a history of or has been diagnosed with heart failure? unknown.    HIV Screen (per [facility] Department of Health, HIV screening must be offered to every individual between ages 13 and 64)	Offered and patient declined	       Physical Exam:  Physical Exam: ICU Vital Signs Last 24 Hrs   T(F): 97.7 (26 Oct 2019 14:55), Max: 97.9 (26 Oct 2019 12:42)  HR: 56 (26 Oct 2019 14:55) (56 - 62)  BP: 125/73 (26 Oct 2019 14:55) (125/73 - 137/86)   RR: 15 (26 Oct 2019 14:55) (15 - 19) SpO2: 100% (26 Oct 2019 14:55) (94% - 100%)	     Physical Exam:  · Constitutional	detailed exam	  · Constitutional Details	well-developed; obese	  · Eyes	detailed exam	  · Eyes Details	PERRL; EOMI; conjunctiva clear	  · ENMT	detailed exam	  · ENMT Details	nose; mouth; pharynx	  · Nose	no discharge	  · Mouth	moist	  · Pharynx	no redness; no discharge	  · Neck	detailed exam 	  · Neck Details	supple; no JVD	  · Breasts	not examined	  · Back	detailed exam 	  · Back Details	ROM intact	  · Respiratory	detailed exam	  · Respiratory Details	clear to auscultation bilaterally	  · Cardiovascular	detailed exam	  · Cardiovascular Details	no murmur 	  · Cardiovascular Details	positive S1; positive S2	  · Gastrointestinal	detailed exam	  · GI Normal	soft; nontender; bowel sounds normal	  · Genitourinary	not examined 	  · Rectal	not examined 	  · Extremities	detailed exam 	  · Extremities Comments	RUE bruising from recent carpel tunnel surg	  · Neurological	detailed exam	  · Neurological Details	alert and oriented x 3; responds to verbal commands; normal strength	  · Skin	detailed exam	  · Skin Comments	no rash	  · Musculoskeletal	detailed exam	  · Musculoskeletal Details	ROM intact; normal strength	  · Psychiatric	detailed exam	  · Psychiatric Details	normal behavior	       Labs and Results: Reviewed      Assessment and Plan:   Pt is a  65 y/o M with CAD, DM II, HTN, HLD on intermittent steroids for demyelinating neuropathy of lower extr admitted w/    Bilat PE.  Normal cTn I, no RV strain on ECG. Sats ok on O2 NC. Hemodynamically stable. Improved clinically so far.  Cont AC, echo. Can be transitioned to Xarelto upon dc.  Monitor sats daily on and off O2.

## 2019-10-27 NOTE — PROGRESS NOTE ADULT - SUBJECTIVE AND OBJECTIVE BOX
HPI:  63 y/o male with a PMHx of MI, CAD, HTN, HLD, LEA on CPAP, demyelinating neuropathy on intermittent steroids presents to the ED c/o MOTT for 5 days with walking up 1 flight of stairs, worsening since onset. No SOB with resting. Denies CP, LE swelling, orthopnea, cough, fever, decreased appetite. Pt states that today he was at rest and felt "flush" so came into ED. Pt with demyelinating neuropathy, takes steroids 4 days on, 24 days off. Pt reports he just finished a 4 day burst of steroids on Oct 23rd. Had regular physical exam 3 week ago which was normal, did not have SOB at that time. Pt had recent carpel tunnel surg for the RUE 2 wks ago.  Pt denies recent plan travel or long journeys. No chest pain, n,v,d. No LOC. No fever/chills. He denies URI, UTI, abd complaints.    SUBJECTIVE:  Pt is evaluated at bedside and found AAOx3, hemodynamically stable and in no acute distress. He report SOB is  improving since admission. He is in a nasal canula with a POS2 96% but desaturate to 80s without oxygen. He report a family hx of DVT including mother, sister and brother. He reports that his sister had " hypercoagulation test' in the past and it was negative. Denies chest pain, leg swelling, abdominal pain and any other symptoms.     MEDICATIONS  (STANDING):  aspirin enteric coated 325 milliGRAM(s) Oral daily  dextrose 10% Bolus 125 milliLiter(s) IV Bolus once  dextrose 10% Bolus 250 milliLiter(s) IV Bolus once  dextrose 5%. 1000 milliLiter(s) (50 mL/Hr) IV Continuous <Continuous>  enalapril 5 milliGRAM(s) Oral daily  enoxaparin Injectable 125 milliGRAM(s) SubCutaneous two times a day  insulin lispro (HumaLOG) corrective regimen sliding scale   SubCutaneous three times a day before meals  insulin lispro (HumaLOG) corrective regimen sliding scale   SubCutaneous at bedtime  latanoprost 0.005% Ophthalmic Solution 1 Drop(s) Both EYES at bedtime  metoprolol succinate ER 25 milliGRAM(s) Oral daily  simvastatin 40 milliGRAM(s) Oral at bedtime  triamterene 37.5 mG/hydrochlorothiazide 25 mG Tablet 1 Tablet(s) Oral daily    MEDICATIONS  (PRN):  dextrose 40% Gel 15 Gram(s) Oral once PRN Blood Glucose LESS THAN 70 milliGRAM(s)/deciLiter  glucagon  Injectable 1 milliGRAM(s) IntraMuscular once PRN Glucose <70 milliGRAM(s)/deciLiter      Vital Signs Last 24 Hrs  T(C): 36.8 (27 Oct 2019 10:35), Max: 36.9 (26 Oct 2019 22:22)  T(F): 98.3 (27 Oct 2019 10:35), Max: 98.5 (26 Oct 2019 22:22)  HR: 66 (27 Oct 2019 10:35) (56 - 96)  BP: 113/76 (27 Oct 2019 10:35) (113/76 - 154/83)  BP(mean): --  RR: 17 (27 Oct 2019 10:35) (16 - 19)  SpO2: 96% (27 Oct 2019 10:35) (85% - 100%)    GEN: NAD, comfortable, resting in bed  HEENT: NC/AT, EOMI, PERRLA, MMM, clear conjunctiva and sclera, normal hearing, no nasal discharge, throat clear, no thrush, normal dentition.   NECK: supple, no JVD, no LAD, no thyromegaly  BACK:  ROM intact, no spinal/paraspinal tenderness  CV: S1S2, RRR, no mumur  RESP: good air movement, CTABL, no rales, rhonchi or wheezing, respirations unlabored  ABD: +BS, soft, ND, NT, no guarding, no rigidity, no HSM  EXT: +2 radial and pedal pulses, no edema, no calve tenderness  SKIN: No visible Rashes or Ulcers  MSK: ROM intact in all extremities  NEURO:  sensory and CN 2-12 Grossly intact, no motor deficits, no, saddle anesthesia, AOx3  PSYCH: normal behavior         LABS:                          15.5   x     )-----------( x        ( 27 Oct 2019 07:56 )             45.1     27 Oct 2019 07:56    136    |  101    |  28     ----------------------------<  125    3.9     |  27     |  0.86     Ca    8.5        27 Oct 2019 07:56    TPro  7.5    /  Alb  4.1    /  TBili  2.3    /  DBili  x      /  AST  18     /  ALT  57     /  AlkPhos  68     26 Oct 2019 13:17    LIVER FUNCTIONS - ( 26 Oct 2019 13:17 )  Alb: 4.1 g/dL / Pro: 7.5 gm/dL / ALK PHOS: 68 U/L / ALT: 57 U/L / AST: 18 U/L / GGT: x           PT/INR - ( 26 Oct 2019 13:17 )   PT: 11.4 sec;   INR: 1.03 ratio         PTT - ( 26 Oct 2019 13:17 )  PTT:23.9 sec  CAPILLARY BLOOD GLUCOSE      POCT Blood Glucose.: 133 mg/dL (27 Oct 2019 11:55)  POCT Blood Glucose.: 125 mg/dL (27 Oct 2019 07:50)  POCT Blood Glucose.: 126 mg/dL (26 Oct 2019 22:23)    CARDIAC MARKERS ( 26 Oct 2019 16:23 )  <0.015 ng/mL / x     / x     / x     / x      CARDIAC MARKERS ( 26 Oct 2019 13:17 )  <0.015 ng/mL / x     / x     / x     / x              RADIOLOGY: HPI:  65 y/o male with a PMHx of MI, CAD, HTN, HLD, LEA on CPAP, demyelinating neuropathy on intermittent steroids presents to the ED c/o MOTT for 5 days with walking up 1 flight of stairs, worsening since onset. No SOB with resting. Denies CP, LE swelling, orthopnea, cough, fever, decreased appetite. Pt states that today he was at rest and felt "flush" so came into ED. Pt with demyelinating neuropathy, takes steroids 4 days on, 24 days off. Pt reports he just finished a 4 day burst of steroids on Oct 23rd. Had regular physical exam 3 week ago which was normal, did not have SOB at that time. Pt had recent carpel tunnel surg for the RUE 2 wks ago.  Pt denies recent plan travel or long journeys. No chest pain, n,v,d. No LOC. No fever/chills. He denies URI, UTI, abd complaints.    SUBJECTIVE:  Pt is evaluated at bedside and found AAOx3, hemodynamically stable and in no acute distress. He report SOB is  improving since admission. He is in a nasal canula with a POS2 96% but desaturate to 80s without oxygen. He report a family hx of DVT including mother, sister and brother. He reports that his sister had " hypercoagulation test' in the past and it was negative. Denies chest pain, leg swelling, abdominal pain and any other symptoms.     Review of Symptoms  Gen: no fevers, chills, sweats, weight loss, fatigue  Visual: no recent changes in vision, no blurriness, no seeing spots  Cardiovascular: no chest pain, no palpitations, no orthopnea, no leg swelling  Respiratory: + shortness of breath.No exertional dyspnea, no cough, no rhinorrhea, no nasal congestion  GI: no difficulty swallowing, no nausea, no vomiting, no abdominal pain, no diarrhea, no constipation, no melana  : no dysuria, no increased freq, no hematuria, no malodorous urine  Derm: no wounds, no rashes  Heme: no easy bleeding or bruising  MSK: no joint pain, no joint swelling or redness, no extremity pain   Neuro: no headache, no numbness, no weakness, no memory loss  Psych: no depression, no anxiety, no SI    MEDICATIONS  (STANDING):  aspirin enteric coated 325 milliGRAM(s) Oral daily  dextrose 10% Bolus 125 milliLiter(s) IV Bolus once  dextrose 10% Bolus 250 milliLiter(s) IV Bolus once  dextrose 5%. 1000 milliLiter(s) (50 mL/Hr) IV Continuous <Continuous>  enalapril 5 milliGRAM(s) Oral daily  enoxaparin Injectable 125 milliGRAM(s) SubCutaneous two times a day  insulin lispro (HumaLOG) corrective regimen sliding scale   SubCutaneous three times a day before meals  insulin lispro (HumaLOG) corrective regimen sliding scale   SubCutaneous at bedtime  latanoprost 0.005% Ophthalmic Solution 1 Drop(s) Both EYES at bedtime  metoprolol succinate ER 25 milliGRAM(s) Oral daily  simvastatin 40 milliGRAM(s) Oral at bedtime  triamterene 37.5 mG/hydrochlorothiazide 25 mG Tablet 1 Tablet(s) Oral daily    MEDICATIONS  (PRN):  dextrose 40% Gel 15 Gram(s) Oral once PRN Blood Glucose LESS THAN 70 milliGRAM(s)/deciLiter  glucagon  Injectable 1 milliGRAM(s) IntraMuscular once PRN Glucose <70 milliGRAM(s)/deciLiter      Vital Signs Last 24 Hrs  T(C): 36.8 (27 Oct 2019 10:35), Max: 36.9 (26 Oct 2019 22:22)  T(F): 98.3 (27 Oct 2019 10:35), Max: 98.5 (26 Oct 2019 22:22)  HR: 66 (27 Oct 2019 10:35) (56 - 96)  BP: 113/76 (27 Oct 2019 10:35) (113/76 - 154/83)  BP(mean): --  RR: 17 (27 Oct 2019 10:35) (16 - 19)  SpO2: 96% (27 Oct 2019 10:35) (85% - 100%)    GEN: NAD, comfortable, resting in bed  HEENT: NC/AT, EOMI, PERRLA, MMM, clear conjunctiva and sclera, normal hearing, no nasal discharge, throat clear, no thrush, normal dentition.   NECK: supple, no JVD, no LAD, no thyromegaly  BACK:  ROM intact, no spinal/paraspinal tenderness  CV: S1S2, RRR, no mumur  RESP: good air movement, CTABL, no rales, rhonchi or wheezing, respirations unlabored  ABD: +BS, soft, ND, NT, no guarding, no rigidity, no HSM  EXT: +2 radial and pedal pulses, no edema, no calve tenderness  SKIN: No visible Rashes or Ulcers  MSK: ROM intact in all extremities  NEURO:  sensory and CN 2-12 Grossly intact, no motor deficits, no, saddle anesthesia, AOx3  PSYCH: normal behavior         LABS:                          15.5   x     )-----------( x        ( 27 Oct 2019 07:56 )             45.1     27 Oct 2019 07:56    136    |  101    |  28     ----------------------------<  125    3.9     |  27     |  0.86     Ca    8.5        27 Oct 2019 07:56    TPro  7.5    /  Alb  4.1    /  TBili  2.3    /  DBili  x      /  AST  18     /  ALT  57     /  AlkPhos  68     26 Oct 2019 13:17    LIVER FUNCTIONS - ( 26 Oct 2019 13:17 )  Alb: 4.1 g/dL / Pro: 7.5 gm/dL / ALK PHOS: 68 U/L / ALT: 57 U/L / AST: 18 U/L / GGT: x           PT/INR - ( 26 Oct 2019 13:17 )   PT: 11.4 sec;   INR: 1.03 ratio         PTT - ( 26 Oct 2019 13:17 )  PTT:23.9 sec  CAPILLARY BLOOD GLUCOSE      POCT Blood Glucose.: 133 mg/dL (27 Oct 2019 11:55)  POCT Blood Glucose.: 125 mg/dL (27 Oct 2019 07:50)  POCT Blood Glucose.: 126 mg/dL (26 Oct 2019 22:23)    CARDIAC MARKERS ( 26 Oct 2019 16:23 )  <0.015 ng/mL / x     / x     / x     / x      CARDIAC MARKERS ( 26 Oct 2019 13:17 )  <0.015 ng/mL / x     / x     / x     / x              RADIOLOGY:    CT Angio Chest PE Protocol w/ IV Cont (10.26.19 @ 17:02) >  IMPRESSION:     Bilateral lower lobe, right middle lobe and left upper lobe pulmonary   emboli    Critical value:  I discussed the finding of this report with nurse in the   emergency room in charge of patient at 10/26/2019 5:11 PM.  Critical   value policy of the hospital was followed.  Read back and confirmation of   receipt of this communication was performed.  This verbal communication   supplements the text report of this document.      US Duplex Venous Lower Ext Complete, Bilateral (10.27.19 @ 10:19) >    In the right leg, there is thrombus seen from the popliteal vein into the   posterior tibial and gastrocnemius veins. The common femoral and femoral   veins are patent. The peroneal and soleal veins are patent.    In the left leg, thrombus is identified in the popliteal vein extending   into the posterior tibial trunk, and peroneal veins. The common femoral   and femoral veins are patent. The soleal and gastrocnemius veins are   patent.    IMPRESSION:    Bilateral deep vein thrombosis as described. Patient has known pulmonary   emboli.

## 2019-10-28 ENCOUNTER — TRANSCRIPTION ENCOUNTER (OUTPATIENT)
Age: 64
End: 2019-10-28

## 2019-10-28 VITALS — OXYGEN SATURATION: 90 %

## 2019-10-28 LAB
ALBUMIN SERPL ELPH-MCNC: 3.1 G/DL — LOW (ref 3.3–5)
ALP SERPL-CCNC: 53 U/L — SIGNIFICANT CHANGE UP (ref 40–120)
ALT FLD-CCNC: 50 U/L — SIGNIFICANT CHANGE UP (ref 12–78)
ANION GAP SERPL CALC-SCNC: 6 MMOL/L — SIGNIFICANT CHANGE UP (ref 5–17)
AST SERPL-CCNC: 18 U/L — SIGNIFICANT CHANGE UP (ref 15–37)
BILIRUB SERPL-MCNC: 2.1 MG/DL — HIGH (ref 0.2–1.2)
BUN SERPL-MCNC: 23 MG/DL — SIGNIFICANT CHANGE UP (ref 7–23)
CALCIUM SERPL-MCNC: 8.6 MG/DL — SIGNIFICANT CHANGE UP (ref 8.5–10.1)
CHLORIDE SERPL-SCNC: 103 MMOL/L — SIGNIFICANT CHANGE UP (ref 96–108)
CO2 SERPL-SCNC: 29 MMOL/L — SIGNIFICANT CHANGE UP (ref 22–31)
CREAT SERPL-MCNC: 0.96 MG/DL — SIGNIFICANT CHANGE UP (ref 0.5–1.3)
GLUCOSE SERPL-MCNC: 150 MG/DL — HIGH (ref 70–99)
HCT VFR BLD CALC: 44.9 % — SIGNIFICANT CHANGE UP (ref 39–50)
HGB BLD-MCNC: 15.7 G/DL — SIGNIFICANT CHANGE UP (ref 13–17)
MAGNESIUM SERPL-MCNC: 2.5 MG/DL — SIGNIFICANT CHANGE UP (ref 1.6–2.6)
MCHC RBC-ENTMCNC: 31.1 PG — SIGNIFICANT CHANGE UP (ref 27–34)
MCHC RBC-ENTMCNC: 35 GM/DL — SIGNIFICANT CHANGE UP (ref 32–36)
MCV RBC AUTO: 88.9 FL — SIGNIFICANT CHANGE UP (ref 80–100)
PHOSPHATE SERPL-MCNC: 4 MG/DL — SIGNIFICANT CHANGE UP (ref 2.5–4.5)
PLATELET # BLD AUTO: 163 K/UL — SIGNIFICANT CHANGE UP (ref 150–400)
POTASSIUM SERPL-MCNC: 4.1 MMOL/L — SIGNIFICANT CHANGE UP (ref 3.5–5.3)
POTASSIUM SERPL-SCNC: 4.1 MMOL/L — SIGNIFICANT CHANGE UP (ref 3.5–5.3)
PROT SERPL-MCNC: 6 GM/DL — SIGNIFICANT CHANGE UP (ref 6–8.3)
RBC # BLD: 5.05 M/UL — SIGNIFICANT CHANGE UP (ref 4.2–5.8)
RBC # FLD: 12.9 % — SIGNIFICANT CHANGE UP (ref 10.3–14.5)
SODIUM SERPL-SCNC: 138 MMOL/L — SIGNIFICANT CHANGE UP (ref 135–145)
WBC # BLD: 8.34 K/UL — SIGNIFICANT CHANGE UP (ref 3.8–10.5)
WBC # FLD AUTO: 8.34 K/UL — SIGNIFICANT CHANGE UP (ref 3.8–10.5)

## 2019-10-28 PROCEDURE — 93306 TTE W/DOPPLER COMPLETE: CPT | Mod: 26

## 2019-10-28 RX ORDER — LATANOPROST 0.05 MG/ML
1 SOLUTION/ DROPS OPHTHALMIC; TOPICAL
Qty: 0 | Refills: 0 | DISCHARGE

## 2019-10-28 RX ORDER — LATANOPROST 0.05 MG/ML
1 SOLUTION/ DROPS OPHTHALMIC; TOPICAL
Qty: 0 | Refills: 0 | DISCHARGE
Start: 2019-10-28

## 2019-10-28 RX ORDER — METOPROLOL TARTRATE 50 MG
1 TABLET ORAL
Qty: 0 | Refills: 0 | DISCHARGE
Start: 2019-10-28

## 2019-10-28 RX ORDER — ASPIRIN/CALCIUM CARB/MAGNESIUM 324 MG
1 TABLET ORAL
Qty: 0 | Refills: 0 | DISCHARGE
Start: 2019-10-28

## 2019-10-28 RX ORDER — DEXAMETHASONE 0.5 MG/5ML
1 ELIXIR ORAL
Qty: 0 | Refills: 0 | DISCHARGE

## 2019-10-28 RX ORDER — METOPROLOL TARTRATE 50 MG
1 TABLET ORAL
Qty: 0 | Refills: 0 | DISCHARGE

## 2019-10-28 RX ORDER — RIVAROXABAN 15 MG-20MG
1 KIT ORAL
Qty: 0 | Refills: 0 | DISCHARGE
Start: 2019-10-28

## 2019-10-28 RX ORDER — ASPIRIN/CALCIUM CARB/MAGNESIUM 324 MG
1 TABLET ORAL
Qty: 0 | Refills: 0 | DISCHARGE

## 2019-10-28 RX ORDER — TRIAMTERENE/HYDROCHLOROTHIAZID 75 MG-50MG
1 TABLET ORAL
Qty: 0 | Refills: 0 | DISCHARGE
Start: 2019-10-28

## 2019-10-28 RX ORDER — RIVAROXABAN 15 MG-20MG
15 KIT ORAL
Refills: 0 | Status: DISCONTINUED | OUTPATIENT
Start: 2019-10-28 | End: 2019-10-28

## 2019-10-28 RX ADMIN — RIVAROXABAN 15 MILLIGRAM(S): KIT at 16:36

## 2019-10-28 RX ADMIN — ENOXAPARIN SODIUM 125 MILLIGRAM(S): 100 INJECTION SUBCUTANEOUS at 05:30

## 2019-10-28 RX ADMIN — Medication 25 MILLIGRAM(S): at 12:44

## 2019-10-28 RX ADMIN — Medication 1 TABLET(S): at 05:30

## 2019-10-28 RX ADMIN — Medication 325 MILLIGRAM(S): at 12:44

## 2019-10-28 RX ADMIN — Medication 5 MILLIGRAM(S): at 05:30

## 2019-10-28 NOTE — DISCHARGE NOTE PROVIDER - HOSPITAL COURSE
65 y/o male with a PMHx of MI, CAD, HTN, HLD, LEA on CPAP, demyelinating neuropathy on intermittent steroids presents to ED on  10/26/19 complaining of  SOB for for the last 5  days. At the beginning SOB started with walking up 1 flight of stairs and progressed to SOB with minimal exertion. Pt had recent carpel tunnel surg for the RUE 2 wks ago.  Pt denied  recent plan travel or long journeys. At arrival to ED a CT of the chest for bilateral lower lobe, right middle lobe and left upper lobe pulmonary emboli. EKG unremarkable. Troponin negative. Pt was admitted and started in full anticoagulation with Lovenox  125 mg subcutaneus BID.US doppler of lower extremities reamrkable for right leg thrombus seen from the plopitreal  vein into the posterior tibial and gastrocnemious veins and lef leg thrombus identified in the plopiteal vein extending into     S            < from: US Duplex Venous Lower Ext Complete, Bilateral (10.27.19 @ 10:19) >            Findings:        In the right leg, there is thrombus seen from the popliteal vein into the     posterior tibial and gastrocnemius veins. The common femoral and femoral     veins are patent. The peroneal and soleal veins are patent.        In the left leg, thrombus is identified in the popliteal vein extending     into the posterior tibial trunk, and peroneal veins. The common femoral     and femoral veins are patent. The soleal and gastrocnemius veins are     patent.        IMPRESSION:        Bilateral deep vein thrombosis as described. Patient has known pulmonary     emboli.        < end of copied text > 63 y/o male with a PMHx of MI, CAD, HTN, HLD, LEA on CPAP, demyelinating neuropathy on intermittent steroids presents to ED on  10/26/19 complaining of  SOB for for the last 5  days. At the beginning SOB started with walking up 1 flight of stairs and progressed to SOB with minimal exertion. Pt denied  recent plan travel or long journeys. At arrival to ED a CT of the chest performed and it showed a bilateral lower lobe, right middle lobe and left upper lobe pulmonary emboli. EKG unremarkable. Troponin negative. Pt was admitted and started in full anticoagulation with Lovenox  125 mg subcutaneus BID.US doppler of lower extremities remarkable for right leg thrombus seen from the popliteal  vein into the posterior tibial and gastrocnemius veins and left leg thrombus was identified in the popiteal vein extending into the posterior tibial trunk and peroneal veins. Cardiology service consulted and recommended to continue  anticoagulation with Xarelto PO at home. Pt is hemodynamically stable and will discharge home today 10/28/19. PCP aware of current medical status.             Vital Signs Last 24 Hrs    T(C): 36.8 (28 Oct 2019 11:10), Max: 36.9 (27 Oct 2019 17:00)    T(F): 98.2 (28 Oct 2019 11:10), Max: 98.5 (27 Oct 2019 17:00)    HR: 80 (28 Oct 2019 11:10) (56 - 80)    BP: 110/68 (28 Oct 2019 11:10) (110/68 - 128/69)    BP(mean): --    RR: 17 (28 Oct 2019 11:10) (17 - 17)    SpO2: 90% (28 Oct 2019 14:11) (90% - 100%)    Vitals    T(F): 98.2 (10-28-19 @ 11:10), Max: 98.5 (10-27-19 @ 17:00)    HR: 80 (10-28-19 @ 11:10) (56 - 80)    BP: 110/68 (10-28-19 @ 11:10) (110/68 - 128/69)    RR: 17 (10-28-19 @ 11:10) (17 - 17)    SpO2: 90% (10-28-19 @ 14:11) (90% - 100%)        Physical Exam     Gen: NAD, comfortable    HENT: atraumatic head and ears, no gross abnormalities of ears, mucous membranes moist, no oral lesions, neck supple without masses/goiter/lymphadenopathy    CV: RRR, nl s1/s2, no M/R/G    Pulm: nl respiratory effort, CTAB, no wheezes/crackles/rhonchi    Back: no scoliosis, lordosis, or kyphosis, no tenderness    Abd: normoactive bowel sounds in all 4 quadrants, soft, nontender, nondistended, no rebound, no guarding, no masses    Extremities: no pedal edema, pedal pulses palpable     Skin: nl warm and dry, no wounds     Neuro: A&Ox3, answering questions appropriately, PERRL, EOMI, face symmetric, sensation equal bilaterally in face, tongue midline, no dysarthria, 5/5 strength in upper and lower extremities bilaterally, sensation intact in upper and lower extremities bilaterally, nl finger to nose, and nl heel to shin     Pysch: no depression, no SI, no HI            RADIOLOGY:         CT Angio Chest PE Protocol w/ IV Cont (10.26.19 @ 17:02) >        IMPRESSION:         Bilateral lower lobe, right middle lobe and left upper lobe pulmonary     emboli        Critical value:  I discussed the finding of this report with nurse in the     emergency room in charge of patient at 10/26/2019 5:11 PM.  Critical     value policy of the hospital was followed.  Read back and confirmation of     receipt of this communication was performed.  This verbal communication     supplements the text report of this document.            US Duplex Venous Lower Ext Complete, Bilateral (10.27.19 @ 10:19) >        Findings:        In the right leg, there is thrombus seen from the popliteal vein into the     posterior tibial and gastrocnemius veins. The common femoral and femoral     veins are patent. The peroneal and soleal veins are patent.        In the left leg, thrombus is identified in the popliteal vein extending     into the posterior tibial trunk, and peroneal veins. The common femoral     and femoral veins are patent. The soleal and gastrocnemius veins are     patent.        IMPRESSION:        Bilateral deep vein thrombosis as described. Patient has known pulmonary     emboli. 63 y/o male with a PMHx of MI, CAD, HTN, HLD, LEA on CPAP, demyelinating neuropathy on intermittent steroids presents to ED on  10/26/19 complaining of  SOB for for the last 5  days. At the beginning SOB started with walking up 1 flight of stairs and progressed to SOB with minimal exertion. Pt denied  recent plan travel or long journeys. At arrival to ED a CT of the chest performed and it showed a bilateral lower lobe, right middle lobe and left upper lobe pulmonary emboli. EKG unremarkable. Troponin negative. Pt was admitted and started in full anticoagulation with Lovenox  125 mg subcutaneus BID.US doppler of lower extremities remarkable for right leg thrombus seen from the popliteal  vein into the posterior tibial and gastrocnemius veins and left leg thrombus was identified in the popiteal vein extending into the posterior tibial trunk and peroneal veins. Cardiology service consulted and recommended to continue  anticoagulation with Xarelto PO at home. Pt is hemodynamically stable and will discharge home today 10/28/19. PCP notified.         Vital Signs Last 24 Hrs    T(C): 36.8 (28 Oct 2019 11:10), Max: 36.9 (27 Oct 2019 17:00)    T(F): 98.2 (28 Oct 2019 11:10), Max: 98.5 (27 Oct 2019 17:00)    HR: 80 (28 Oct 2019 11:10) (56 - 80)    BP: 110/68 (28 Oct 2019 11:10) (110/68 - 128/69)    BP(mean): --    RR: 17 (28 Oct 2019 11:10) (17 - 17)    SpO2: 90% (28 Oct 2019 14:11) (90% - 100%)    Vitals    T(F): 98.2 (10-28-19 @ 11:10), Max: 98.5 (10-27-19 @ 17:00)    HR: 80 (10-28-19 @ 11:10) (56 - 80)    BP: 110/68 (10-28-19 @ 11:10) (110/68 - 128/69)    RR: 17 (10-28-19 @ 11:10) (17 - 17)    SpO2: 90% (10-28-19 @ 14:11) (90% - 100%)        Physical Exam     Gen: NAD, comfortable    HENT: atraumatic head and ears, no gross abnormalities of ears, mucous membranes moist, no oral lesions, neck supple without masses/goiter/lymphadenopathy    CV: RRR, nl s1/s2, no M/R/G    Pulm: nl respiratory effort, CTAB, no wheezes/crackles/rhonchi    Back: no scoliosis, lordosis, or kyphosis, no tenderness    Abd: normoactive bowel sounds in all 4 quadrants, soft, nontender, nondistended, no rebound, no guarding, no masses    Extremities: no pedal edema, pedal pulses palpable     Skin: nl warm and dry, no wounds     Neuro: A&Ox3, answering questions appropriately, PERRL, EOMI, face symmetric, sensation equal bilaterally in face, tongue midline, no dysarthria, 5/5 strength in upper and lower extremities bilaterally, sensation intact in upper and lower extremities bilaterally, nl finger to nose, and nl heel to shin     Pysch: no depression, no SI, no HI            RADIOLOGY:         CT Angio Chest PE Protocol w/ IV Cont (10.26.19 @ 17:02) >        IMPRESSION:         Bilateral lower lobe, right middle lobe and left upper lobe pulmonary     emboli        Critical value:  I discussed the finding of this report with nurse in the     emergency room in charge of patient at 10/26/2019 5:11 PM.  Critical     value policy of the hospital was followed.  Read back and confirmation of     receipt of this communication was performed.  This verbal communication     supplements the text report of this document.            US Duplex Venous Lower Ext Complete, Bilateral (10.27.19 @ 10:19) >        Findings:        In the right leg, there is thrombus seen from the popliteal vein into the     posterior tibial and gastrocnemius veins. The common femoral and femoral     veins are patent. The peroneal and soleal veins are patent.        In the left leg, thrombus is identified in the popliteal vein extending     into the posterior tibial trunk, and peroneal veins. The common femoral     and femoral veins are patent. The soleal and gastrocnemius veins are     patent.        IMPRESSION:        Bilateral deep vein thrombosis as described. Patient has known pulmonary     emboli.

## 2019-10-28 NOTE — DISCHARGE NOTE PROVIDER - CARE PROVIDER_API CALL
Jassi Villafana)  Cardiovascular Disease; Internal Medicine  [address] [place] [address]  Phone: [phone]  Fax: [phone]  Follow Up Time:

## 2019-10-28 NOTE — DISCHARGE NOTE PROVIDER - NSDCCPCAREPLAN_GEN_ALL_CORE_FT
PRINCIPAL DISCHARGE DIAGNOSIS  Diagnosis: Pulmonary emboli  Assessment and Plan of Treatment: You came to the hospital  due to shorthness of breath and found to have clots in your lungs and your legs. Yo will need to continue with lifelong blood thinner. You will continue with Xarelto 15 mg oral  twice daily for 21 days and then will continue with xarelto 20 mg oral daily. You need to follow up with your primary doctor and cardiology.  Return to emergency room if develop difficulty breathing, chest pain, dizziness or if necessary.      SECONDARY DISCHARGE DIAGNOSES  Diagnosis: DVT, lower extremity  Assessment and Plan of Treatment: You were found with bilateral leg clots. You will need  to continue with blood thinner    Diagnosis: Hypoxia  Assessment and Plan of Treatment: You had low oxygen saturation required oxygen.  Oxygen with ambulation were 90% and you don't need to continue with oxygen at home.    Diagnosis: CAD (coronary artery disease)  Assessment and Plan of Treatment: Stable. Continue with home medications. PRINCIPAL DISCHARGE DIAGNOSIS  Diagnosis: Pulmonary emboli  Assessment and Plan of Treatment: You came to the hospital  due to shorthness of breath and found to have clots in your lungs and your legs. Yo will need to continue with lifelong blood thinner. You will continue with Xarelto 15 mg oral  twice daily for 21 days and then will continue with xarelto 20 mg oral daily. You need to follow up with your primary doctor and cardiology.  An ECHO performed on 10/28/19 and you will need to follow up the results with your cardiology.  Bilirubin also mildly elevated, follow up with your PCP for further recommendations.   Return to emergency room if develop difficulty breathing, chest pain, dizziness or if necessary.      SECONDARY DISCHARGE DIAGNOSES  Diagnosis: DVT, lower extremity  Assessment and Plan of Treatment: You were found with bilateral leg clots. You will need  to continue with blood thinner    Diagnosis: Hypoxia  Assessment and Plan of Treatment: You had low oxygen saturation required oxygen.  Oxygen with ambulation were 90% and you don't need to continue with oxygen at home.    Diagnosis: CAD (coronary artery disease)  Assessment and Plan of Treatment: Stable. Continue with home medications.

## 2019-10-28 NOTE — CDI QUERY NOTE - NSCDIOTHERTXTBX2_GEN_ALL_CORE_FT
Documentation on chart of Bilateral PE and bilateral DVT.    Pt. had carpal tunnel surgery 2 weeks PTA.    Please document the relationship between the following conditions:  A) Bilateral PE and DVT is due to/related to previous carpel tunnel surgery  B) No link between previous surgery and bilateral PE and DVT  C) Unknown  D) Other ( Please specify condition)

## 2019-10-28 NOTE — PHARMACOTHERAPY INTERVENTION NOTE - COMMENTS
Per [facility] pharmacy copay $140 - wife aware and agreeable; patient provided w/ a free 30 day coupon

## 2019-10-28 NOTE — PROGRESS NOTE ADULT - SUBJECTIVE AND OBJECTIVE BOX
Pt has been seen and examined with FP resident, resident supervised agree with a/p       Patient is a 64y old  Male who presents with a chief complaint of shortness of breath   Fam Hx:  Mother DVT and PE  Brother DVT  Sister DVT (26 Oct 2019 18:56)        PHYSICAL EXAM:  Vital Signs Last 24 Hrs  T(C): 36.8 (28 Oct 2019 11:10), Max: 36.9 (27 Oct 2019 17:00)  T(F): 98.2 (28 Oct 2019 11:10), Max: 98.5 (27 Oct 2019 17:00)  HR: 80 (28 Oct 2019 11:10) (56 - 80)  BP: 110/68 (28 Oct 2019 11:10) (110/68 - 128/69)  BP(mean): --  RR: 17 (28 Oct 2019 11:10) (17 - 17)  SpO2: 96% (28 Oct 2019 11:10) (96% - 100%)  general- comfortable   -rs-aeeb,cta  -cvs-s1s2 normal   -p/a-soft,bs+  -extremity- no asymmetrical swelling noted   -cns- non focal         A/P    #d./c today with further management as an outpt, time spent 45 minutes     #Probably no relation between carpel tunnel surgery and DVT/PE

## 2019-10-28 NOTE — PROGRESS NOTE ADULT - SUBJECTIVE AND OBJECTIVE BOX
History of Present Illness:  Reason for Admission: Bilateral PEs  HYpoxia to 80s  with air hunger on ambulation DM	  History of Present Illness: 	  63 y/o male with a PMHx of MI, CAD, HTN, HLD, LEA on CPAP, demyelinating neuropathy on intermittent steroids presents to the ED c/o MOTT for 5 days with walking up 1 flight of stairs, worsening since onset. No SOB with resting. Denies CP, LE swelling, orthopnea, cough, fever, decreased appetite. Pt states that today he was at rest and felt "flush" so came into ED. Pt with demyelinating neuropathy, takes steroids 4 days on, 24 days off. Pt reports he just finished a 4 day burst of steroids on Oct 23rd. Had regular physical exam 3 week ago which was normal, did not have SOB at that time. Pt had recent carpel tunnel surg for the RUE 2 wks ago.      Pt denies recent plan travel or long journeys.    No cpain, n,v,d. No LOC. No fever/chills. He denies URI, UTI, abd complaints.    10/28: sinus rhythm on monitor. Echo pending. Would switch to PO Xarelto today, 15 mg q12h. Get Heme consult. Family H/O prothrombin gene mutation, mother and sister.      Examined at bed side, feels fine today, states got out of bed to go to bathroom and was feeling ok.     Fam Hx:  Mother DVT and PE  Brother DVT  Sister DVT         Review of Systems:  Review of Systems: Pt reports hoarseness when he takes steroids.	  Other Review of Systems: All other review of systems negative, except as noted in HPI	      Allergies and Intolerances:        Allergies:  	No Known Allergies:     Home Medications:   * Patient Currently Takes Medications as of 26-Oct-2019 21:13 documented in Structured Notes  · 	aspirin 325 mg oral delayed release tablet: Last Dose Taken:  , 1 tab(s) orally once a day  · 	metoprolol succinate 25 mg oral tablet, extended release: Last Dose Taken:  , 1 tab(s) orally once a day  · 	ezetimibe-simvastatin 10 mg-40 mg oral tablet: Last Dose Taken:  , 1 tab(s) orally once a day  · 	enalapril 5 mg oral tablet: Last Dose Taken:  , 1 tab(s) orally once a day  · 	triamterene-hydrochlorothiazide 37.5mg-25mg oral capsule: Last Dose Taken:  , 1 cap(s) orally once a day (in the morning)  · 	Vitamin D2 2000 intl units oral capsule: Last Dose Taken:  , 1 cap(s) orally once a day  · 	Vitamin B-12 100 mcg oral tablet: Last Dose Taken:  , 1 tab(s) orally once a day  · 	metFORMIN 500 mg oral tablet: Last Dose Taken:  , 1 tab(s) orally 2 times a day  · 	dexamethasone 6 mg oral tablet: Last Dose Taken:  , 7 tablets (42 mg) by mouth daily for 4 days *repeat cycle every month *last taken from 10/21/19 to 10/24/19  · 	Xalatan 0.005% ophthalmic solution: 1 drop(s) to both eyes once a day (in the evening)    Patient History:    Past Medical, Past Surgical, and Family History:  PAST MEDICAL HISTORY:  CAD (coronary artery disease)     Demyelinating neuropathy     HLD (hyperlipidemia)     HTN (hypertension)     MI, old     LEA (obstructive sleep apnea).     PAST SURGICAL HISTORY:  H/O Achilles tendon repair     History of cholecystectomy     History of percutaneous coronary intervention RCA stent 2008.     Social History:  Social History (marital status, living situation, occupation, tobacco use, alcohol and drug use, and sexual history): Pt lives with his wife.  He is retired from working in computer programming.  Pt is a former 12 pack year smoker and smoked a cigar/day until age 40.  He reports an occasional beer per week.  Pt denies drug use.	     Tobacco Screening:  · Core Measure Site	Yes	  · Has the patient used tobacco in the past 30 days?	No	    Risk Assessment:    Present on Admission:  Deep Venous Thrombosis	no	  Pulmonary Embolus	yes	  Urinary Catheter	no	  Central Venous Catheter/PICC Line	no	  Surgical Site Incision	no	  Pressure Ulcer(s)	no	     Heart Failure:  Does this patient have a history of or has been diagnosed with heart failure? unknown.    HIV Screen (per [facility] Department of Health, HIV screening must be offered to every individual between ages 13 and 64)	Offered and patient declined	       Physical Exam:  Physical Exam: ICU Vital Signs Last 24 Hrs   T(F): 97.7 (26 Oct 2019 14:55), Max: 97.9 (26 Oct 2019 12:42)  HR: 56 (26 Oct 2019 14:55) (56 - 62)  BP: 125/73 (26 Oct 2019 14:55) (125/73 - 137/86)   RR: 15 (26 Oct 2019 14:55) (15 - 19) SpO2: 100% (26 Oct 2019 14:55) (94% - 100%)	     Physical Exam:  · Constitutional	detailed exam	  · Constitutional Details	well-developed; obese	  · Eyes	detailed exam	  · Eyes Details	PERRL; EOMI; conjunctiva clear	  · ENMT	detailed exam	  · ENMT Details	nose; mouth; pharynx	  · Nose	no discharge	  · Mouth	moist	  · Pharynx	no redness; no discharge	  · Neck	detailed exam 	  · Neck Details	supple; no JVD	  · Breasts	not examined	  · Back	detailed exam 	  · Back Details	ROM intact	  · Respiratory	detailed exam	  · Respiratory Details	clear to auscultation bilaterally	  · Cardiovascular	detailed exam	  · Cardiovascular Details	no murmur 	  · Cardiovascular Details	positive S1; positive S2	  · Gastrointestinal	detailed exam	  · GI Normal	soft; nontender; bowel sounds normal	  · Genitourinary	not examined 	  · Rectal	not examined 	  · Extremities	detailed exam 	  · Extremities Comments	RUE bruising from recent carpel tunnel surg	  · Neurological	detailed exam	  · Neurological Details	alert and oriented x 3; responds to verbal commands; normal strength	  · Skin	detailed exam	  · Skin Comments	no rash	  · Musculoskeletal	detailed exam	  · Musculoskeletal Details	ROM intact; normal strength	  · Psychiatric	detailed exam	  · Psychiatric Details	normal behavior	       Labs and Results: Reviewed      Assessment and Plan:   Pt is a  63 y/o M with CAD, DM II, HTN, HLD on intermittent steroids for demyelinating neuropathy of lower extr admitted w/    Bilat PE.  Normal cTn I, no RV strain on ECG. Sats ok on O2 NC. Hemodynamically stable. Improved clinically so far.  Cont AC, echo. Can be transitioned to Xarelto upon dc.  Monitor sats daily on and off O2.

## 2019-10-28 NOTE — DISCHARGE NOTE NURSING/CASE MANAGEMENT/SOCIAL WORK - PATIENT PORTAL LINK FT
You can access the FollowMyHealth Patient Portal offered by [facility] by registering at the following website: [email or web address]. By joining [employer]’s FollowMyHealth portal, you will also be able to view your health information using other applications (apps) compatible with our system.

## 2019-10-28 NOTE — CDI QUERY NOTE - NSCDIOTHERTXTBX_GEN_ALL_CORE_HH
Documentation on chart of Bilateral PE and Bilateral DVT.    ED Provider: Pt failed ambulation trial:  + gasping for air during short walk < 20 ft., + desaturated down into 80s, no associated chest pain.      Progress note documented - Hypoxia to 80's with air hunger     RR: 17 (28 Oct 2019 05:24) (15 - 19)    SpO2: 100% (28 Oct 2019 05:24) (85% - 100%)    Oxygen Delivery Therapy:   Oxygen Method: Nasal Cannula  LPM: 2.5  Notify Provider for SpO2 BELOW: 90    Please clarify a diagnosis based on the above clinical guidelines.  A) Acute Hypoxic Resp. Failure  B) Acute Respiratory Distress Syndrome  C) Other (specify)  D) Unknown

## 2019-10-28 NOTE — PROGRESS NOTE ADULT - ASSESSMENT
Pt is a  65 y/o M with CAD, DM II, HTN, HLD on intermittent steroids for demyelinating neuropathy of lower extr admitted w/  Bilat PE's, increasing MOTT  HYpoxia to 80s  with air hunger on ambulation  DM,  Hg A1C 6.2 3-4 mo ago  Sleep Apnea on CPAP at home  - anticoagulation with started with lovenox in the ED  - O2 support and CPAP ovenight  - echocardiogram to eval for R vent strain  - dupplex bilat LE positive for bilat DVT's.  - Heme consult  - outpt f/u with oncology  - Insulin sliding scale, hold metformin tonight  - DVT prophylaxis : on lovenox for PE  - IMPROVE VTE Individual Risk Assessment

## 2019-10-28 NOTE — DISCHARGE NOTE PROVIDER - CARE PROVIDERS DIRECT ADDRESSES
[email or web address]

## 2019-10-31 DIAGNOSIS — I10 ESSENTIAL (PRIMARY) HYPERTENSION: ICD-10-CM

## 2019-10-31 DIAGNOSIS — E78.5 HYPERLIPIDEMIA, UNSPECIFIED: ICD-10-CM

## 2019-10-31 DIAGNOSIS — Z79.82 LONG TERM (CURRENT) USE OF ASPIRIN: ICD-10-CM

## 2019-10-31 DIAGNOSIS — G37.9 DEMYELINATING DISEASE OF CENTRAL NERVOUS SYSTEM, UNSPECIFIED: ICD-10-CM

## 2019-10-31 DIAGNOSIS — G47.33 OBSTRUCTIVE SLEEP APNEA (ADULT) (PEDIATRIC): ICD-10-CM

## 2019-10-31 DIAGNOSIS — I82.4Z3 ACUTE EMBOLISM AND THROMBOSIS OF UNSPECIFIED DEEP VEINS OF DISTAL LOWER EXTREMITY, BILATERAL: ICD-10-CM

## 2019-10-31 DIAGNOSIS — Z87.891 PERSONAL HISTORY OF NICOTINE DEPENDENCE: ICD-10-CM

## 2019-10-31 DIAGNOSIS — I25.2 OLD MYOCARDIAL INFARCTION: ICD-10-CM

## 2019-10-31 DIAGNOSIS — Z79.899 OTHER LONG TERM (CURRENT) DRUG THERAPY: ICD-10-CM

## 2019-10-31 DIAGNOSIS — I26.99 OTHER PULMONARY EMBOLISM WITHOUT ACUTE COR PULMONALE: ICD-10-CM

## 2019-12-23 NOTE — ED ADULT NURSE NOTE - SUICIDE SCREENING QUESTION 1
[Initial Visit] : an initial visit for [Knee Pain] : knee pain [FreeTextEntry2] : Right knee pain No

## 2020-01-06 PROBLEM — Z00.00 ENCOUNTER FOR PREVENTIVE HEALTH EXAMINATION: Status: ACTIVE | Noted: 2020-01-06

## 2020-01-06 PROBLEM — G62.9 POLYNEUROPATHY, UNSPECIFIED: Chronic | Status: ACTIVE | Noted: 2019-10-26

## 2020-01-28 ENCOUNTER — APPOINTMENT (OUTPATIENT)
Dept: VASCULAR SURGERY | Facility: CLINIC | Age: 65
End: 2020-01-28
Payer: MEDICARE

## 2020-01-28 VITALS
SYSTOLIC BLOOD PRESSURE: 151 MMHG | BODY MASS INDEX: 35.79 KG/M2 | HEART RATE: 63 BPM | HEIGHT: 70 IN | TEMPERATURE: 98 F | WEIGHT: 250 LBS | DIASTOLIC BLOOD PRESSURE: 83 MMHG

## 2020-01-28 DIAGNOSIS — Z87.891 PERSONAL HISTORY OF NICOTINE DEPENDENCE: ICD-10-CM

## 2020-01-28 DIAGNOSIS — I26.99 OTHER PULMONARY EMBOLISM W/OUT ACUTE COR PULMONALE: ICD-10-CM

## 2020-01-28 DIAGNOSIS — Z86.718 PERSONAL HISTORY OF OTHER VENOUS THROMBOSIS AND EMBOLISM: ICD-10-CM

## 2020-01-28 DIAGNOSIS — Z86.39 PERSONAL HISTORY OF OTHER ENDOCRINE, NUTRITIONAL AND METABOLIC DISEASE: ICD-10-CM

## 2020-01-28 DIAGNOSIS — Z86.79 PERSONAL HISTORY OF OTHER DISEASES OF THE CIRCULATORY SYSTEM: ICD-10-CM

## 2020-01-28 DIAGNOSIS — G62.9 POLYNEUROPATHY, UNSPECIFIED: ICD-10-CM

## 2020-01-28 PROCEDURE — 93970 EXTREMITY STUDY: CPT

## 2020-01-28 PROCEDURE — 99203 OFFICE O/P NEW LOW 30 MIN: CPT

## 2020-01-30 NOTE — ASSESSMENT
[FreeTextEntry1] : 63 yo former smoker with history of htn, hld, cad s/p pci, s/p recent dvt and pe currently on xarelto reports significant family history of dvt and aaa. \par venous duplex shows chronic dvt of the right pop and gastroc with severe deep and superficial insufficiency b/l lower extremities \par at this time pt to follow up with hematology for further anticoagulation management \par would recommend compression stockings \par pt advised to follow up prior to knee replacement surgery if ivc filter recommended \par will schedule for screening aortic given family history of aaa and personal smoking history

## 2020-01-30 NOTE — PHYSICAL EXAM
[2+] : left 2+ [Varicose Veins Of Lower Extremities] : bilaterally [Ankle Swelling On The Left] : moderate [No Rash or Lesion] : No rash or lesion [Calm] : calm [Alert] : alert [JVD] : no jugular venous distention  [Ankle Swelling (On Exam)] : not present [] : not present [Skin Ulcer] : no ulcer [de-identified] : motor intact b/l lower extremities, muscle strength 5/5 with dorsi and plantar flexion\par no calf tenderness [de-identified] : appears well

## 2020-01-30 NOTE — HISTORY OF PRESENT ILLNESS
[FreeTextEntry1] : 63 yo former smoker with history of htn, hld, cad s/p pci, s/p recent dvt and pe currently on xarelto reports significant family history of dvt and aaa.  \par pt without any complaints at this time\par pt denies any provoking factors\par pt denies any history of ulcers or bleeding

## 2020-02-13 ENCOUNTER — APPOINTMENT (OUTPATIENT)
Dept: VASCULAR SURGERY | Facility: CLINIC | Age: 65
End: 2020-02-13
Payer: MEDICARE

## 2020-02-13 PROCEDURE — 93978 VASCULAR STUDY: CPT

## 2020-11-06 ENCOUNTER — EMERGENCY (EMERGENCY)
Facility: HOSPITAL | Age: 65
LOS: 0 days | Discharge: HOME | End: 2020-11-06
Payer: MEDICARE

## 2020-11-06 VITALS
HEIGHT: 70 IN | TEMPERATURE: 98 F | SYSTOLIC BLOOD PRESSURE: 147 MMHG | HEART RATE: 68 BPM | OXYGEN SATURATION: 100 % | RESPIRATION RATE: 18 BRPM | DIASTOLIC BLOOD PRESSURE: 88 MMHG

## 2020-11-06 DIAGNOSIS — Z98.890 OTHER SPECIFIED POSTPROCEDURAL STATES: Chronic | ICD-10-CM

## 2020-11-06 DIAGNOSIS — Z20.828 CONTACT WITH AND (SUSPECTED) EXPOSURE TO OTHER VIRAL COMMUNICABLE DISEASES: ICD-10-CM

## 2020-11-06 DIAGNOSIS — Z90.49 ACQUIRED ABSENCE OF OTHER SPECIFIED PARTS OF DIGESTIVE TRACT: Chronic | ICD-10-CM

## 2020-11-06 LAB — SARS-COV-2 RNA SPEC QL NAA+PROBE: SIGNIFICANT CHANGE UP

## 2020-11-06 PROCEDURE — 99283 EMERGENCY DEPT VISIT LOW MDM: CPT | Mod: CS

## 2020-11-06 PROCEDURE — U0003: CPT

## 2020-11-06 PROCEDURE — 99283 EMERGENCY DEPT VISIT LOW MDM: CPT

## 2020-11-06 PROCEDURE — 99282 EMERGENCY DEPT VISIT SF MDM: CPT

## 2020-11-06 NOTE — ED PROVIDER NOTE - PATIENT PORTAL LINK FT
You can access the FollowMyHealth Patient Portal offered by [facility] by registering at the following website: [email or web address]. By joining [employer]’s FollowMyHealth portal, you will also be able to view your health information using other applications (apps) compatible with our system.

## 2020-11-06 NOTE — ED ADULT TRIAGE NOTE - CHIEF COMPLAINT QUOTE
Covid testing positive exposure-no symptoms. Patient evaluated, treated, and discharged by PA. Refer to PA note for assessment.  Discharge instructions given verbally and understood by patient. Self-quarantine and COVID-19 information provided to patient. Unable to sign secondary to COVID-19 PUI.

## 2020-11-06 NOTE — ED PROVIDER NOTE - NSFOLLOWUPINSTRUCTIONS_ED_ALL_ED_FT
How to get your Coronavirus (COVID-19) Testing Results:   Please be advised that you were tested for the coronavirus (COVID-19) in the Emergency Department at [facility].  You are to maintain self-quarantine procedures for 14 days until instructed otherwise by one of our healthcare agents. Please note that the test may take up to 2-4 days to result.  If you do not hear from us within 72 hours and you'd like to check on your results, you can call on of our coronavirus specialists at [address] [place] [address] (available 24/7).  Please DO NOT call the site where you received the test to obtain your results.

## 2020-11-06 NOTE — ED PROVIDER NOTE - PMH
CAD (coronary artery disease)    Demyelinating neuropathy    HLD (hyperlipidemia)    HTN (hypertension)    MI, old    LEA (obstructive sleep apnea)

## 2021-06-17 NOTE — ED ADULT NURSE NOTE - BREATH SOUNDS, MLM
Detail Level: Detailed
Quality 130: Documentation Of Current Medications In The Medical Record: Current Medications Documented
Clear

## 2021-06-24 ENCOUNTER — RESULT REVIEW (OUTPATIENT)
Age: 66
End: 2021-06-24

## 2021-06-24 ENCOUNTER — OUTPATIENT (OUTPATIENT)
Dept: OUTPATIENT SERVICES | Facility: HOSPITAL | Age: 66
LOS: 1 days | Discharge: HOME | End: 2021-06-24
Payer: MEDICARE

## 2021-06-24 VITALS
SYSTOLIC BLOOD PRESSURE: 157 MMHG | HEART RATE: 66 BPM | DIASTOLIC BLOOD PRESSURE: 90 MMHG | RESPIRATION RATE: 22 BRPM | TEMPERATURE: 97 F | OXYGEN SATURATION: 99 % | WEIGHT: 270.07 LBS | HEIGHT: 71 IN

## 2021-06-24 DIAGNOSIS — Z90.49 ACQUIRED ABSENCE OF OTHER SPECIFIED PARTS OF DIGESTIVE TRACT: Chronic | ICD-10-CM

## 2021-06-24 DIAGNOSIS — Z98.890 OTHER SPECIFIED POSTPROCEDURAL STATES: Chronic | ICD-10-CM

## 2021-06-24 DIAGNOSIS — Z12.11 ENCOUNTER FOR SCREENING FOR MALIGNANT NEOPLASM OF COLON: ICD-10-CM

## 2021-06-24 PROCEDURE — 88305 TISSUE EXAM BY PATHOLOGIST: CPT

## 2021-06-24 PROCEDURE — C1889: CPT

## 2021-06-24 PROCEDURE — 93010 ELECTROCARDIOGRAM REPORT: CPT

## 2021-06-24 PROCEDURE — 93005 ELECTROCARDIOGRAM TRACING: CPT | Mod: XU

## 2021-06-24 PROCEDURE — 88305 TISSUE EXAM BY PATHOLOGIST: CPT | Mod: 26

## 2021-06-24 PROCEDURE — C1769: CPT

## 2021-06-24 NOTE — ASU PATIENT PROFILE, ADULT - PMH
CAD (coronary artery disease)    Demyelinating neuropathy    HLD (hyperlipidemia)    HTN (hypertension)    MI, old    LEA (obstructive sleep apnea)     CAD (coronary artery disease)    Demyelinating neuropathy    DM (diabetes mellitus)    DVT (deep venous thrombosis)    HLD (hyperlipidemia)    HTN (hypertension)    MI, old    LEA (obstructive sleep apnea)    Personal history of PE (pulmonary embolism)

## 2021-06-30 ENCOUNTER — OUTPATIENT (OUTPATIENT)
Dept: OUTPATIENT SERVICES | Facility: HOSPITAL | Age: 66
LOS: 1 days | End: 2021-06-30
Payer: MEDICARE

## 2021-06-30 VITALS
HEART RATE: 68 BPM | WEIGHT: 276.68 LBS | SYSTOLIC BLOOD PRESSURE: 101 MMHG | DIASTOLIC BLOOD PRESSURE: 69 MMHG | OXYGEN SATURATION: 98 % | TEMPERATURE: 97 F | RESPIRATION RATE: 16 BRPM | HEIGHT: 70 IN

## 2021-06-30 DIAGNOSIS — D12.2 BENIGN NEOPLASM OF ASCENDING COLON: ICD-10-CM

## 2021-06-30 DIAGNOSIS — Z79.82 LONG TERM (CURRENT) USE OF ASPIRIN: ICD-10-CM

## 2021-06-30 DIAGNOSIS — E66.9 OBESITY, UNSPECIFIED: ICD-10-CM

## 2021-06-30 DIAGNOSIS — Z12.11 ENCOUNTER FOR SCREENING FOR MALIGNANT NEOPLASM OF COLON: ICD-10-CM

## 2021-06-30 DIAGNOSIS — Z98.890 OTHER SPECIFIED POSTPROCEDURAL STATES: Chronic | ICD-10-CM

## 2021-06-30 DIAGNOSIS — I25.2 OLD MYOCARDIAL INFARCTION: ICD-10-CM

## 2021-06-30 DIAGNOSIS — Z87.891 PERSONAL HISTORY OF NICOTINE DEPENDENCE: ICD-10-CM

## 2021-06-30 DIAGNOSIS — Z01.818 ENCOUNTER FOR OTHER PREPROCEDURAL EXAMINATION: ICD-10-CM

## 2021-06-30 DIAGNOSIS — Z90.49 ACQUIRED ABSENCE OF OTHER SPECIFIED PARTS OF DIGESTIVE TRACT: Chronic | ICD-10-CM

## 2021-06-30 DIAGNOSIS — G47.33 OBSTRUCTIVE SLEEP APNEA (ADULT) (PEDIATRIC): ICD-10-CM

## 2021-06-30 DIAGNOSIS — Z79.84 LONG TERM (CURRENT) USE OF ORAL HYPOGLYCEMIC DRUGS: ICD-10-CM

## 2021-06-30 DIAGNOSIS — E11.42 TYPE 2 DIABETES MELLITUS WITH DIABETIC POLYNEUROPATHY: ICD-10-CM

## 2021-06-30 DIAGNOSIS — I25.10 ATHEROSCLEROTIC HEART DISEASE OF NATIVE CORONARY ARTERY WITHOUT ANGINA PECTORIS: ICD-10-CM

## 2021-06-30 DIAGNOSIS — I10 ESSENTIAL (PRIMARY) HYPERTENSION: ICD-10-CM

## 2021-06-30 DIAGNOSIS — M17.12 UNILATERAL PRIMARY OSTEOARTHRITIS, LEFT KNEE: ICD-10-CM

## 2021-06-30 DIAGNOSIS — Z29.9 ENCOUNTER FOR PROPHYLACTIC MEASURES, UNSPECIFIED: ICD-10-CM

## 2021-06-30 DIAGNOSIS — K64.8 OTHER HEMORRHOIDS: ICD-10-CM

## 2021-06-30 DIAGNOSIS — K57.30 DIVERTICULOSIS OF LARGE INTESTINE WITHOUT PERFORATION OR ABSCESS WITHOUT BLEEDING: ICD-10-CM

## 2021-06-30 DIAGNOSIS — Z99.89 DEPENDENCE ON OTHER ENABLING MACHINES AND DEVICES: ICD-10-CM

## 2021-06-30 DIAGNOSIS — Z86.718 PERSONAL HISTORY OF OTHER VENOUS THROMBOSIS AND EMBOLISM: ICD-10-CM

## 2021-06-30 DIAGNOSIS — Z79.01 LONG TERM (CURRENT) USE OF ANTICOAGULANTS: ICD-10-CM

## 2021-06-30 DIAGNOSIS — Z86.711 PERSONAL HISTORY OF PULMONARY EMBOLISM: ICD-10-CM

## 2021-06-30 DIAGNOSIS — Z95.5 PRESENCE OF CORONARY ANGIOPLASTY IMPLANT AND GRAFT: ICD-10-CM

## 2021-06-30 LAB
A1C WITH ESTIMATED AVERAGE GLUCOSE RESULT: 6.5 % — HIGH (ref 4–5.6)
ALBUMIN SERPL ELPH-MCNC: 3.7 G/DL — SIGNIFICANT CHANGE UP (ref 3.3–5)
ANION GAP SERPL CALC-SCNC: 5 MMOL/L — SIGNIFICANT CHANGE UP (ref 5–17)
APTT BLD: 33.9 SEC — SIGNIFICANT CHANGE UP (ref 27.5–35.5)
BASOPHILS # BLD AUTO: 0.07 K/UL — SIGNIFICANT CHANGE UP (ref 0–0.2)
BASOPHILS NFR BLD AUTO: 1.2 % — SIGNIFICANT CHANGE UP (ref 0–2)
BUN SERPL-MCNC: 10 MG/DL — SIGNIFICANT CHANGE UP (ref 7–23)
CALCIUM SERPL-MCNC: 9 MG/DL — SIGNIFICANT CHANGE UP (ref 8.5–10.1)
CHLORIDE SERPL-SCNC: 107 MMOL/L — SIGNIFICANT CHANGE UP (ref 96–108)
CO2 SERPL-SCNC: 27 MMOL/L — SIGNIFICANT CHANGE UP (ref 22–31)
CREAT SERPL-MCNC: 0.81 MG/DL — SIGNIFICANT CHANGE UP (ref 0.5–1.3)
EOSINOPHIL # BLD AUTO: 0.18 K/UL — SIGNIFICANT CHANGE UP (ref 0–0.5)
EOSINOPHIL NFR BLD AUTO: 3.2 % — SIGNIFICANT CHANGE UP (ref 0–6)
ESTIMATED AVERAGE GLUCOSE: 140 MG/DL — HIGH (ref 68–114)
GLUCOSE SERPL-MCNC: 117 MG/DL — HIGH (ref 70–99)
HCT VFR BLD CALC: 44 % — SIGNIFICANT CHANGE UP (ref 39–50)
HGB BLD-MCNC: 14.9 G/DL — SIGNIFICANT CHANGE UP (ref 13–17)
IMM GRANULOCYTES NFR BLD AUTO: 0.2 % — SIGNIFICANT CHANGE UP (ref 0–1.5)
INR BLD: 1.19 RATIO — HIGH (ref 0.88–1.16)
LYMPHOCYTES # BLD AUTO: 1.48 K/UL — SIGNIFICANT CHANGE UP (ref 1–3.3)
LYMPHOCYTES # BLD AUTO: 26.1 % — SIGNIFICANT CHANGE UP (ref 13–44)
MCHC RBC-ENTMCNC: 30.7 PG — SIGNIFICANT CHANGE UP (ref 27–34)
MCHC RBC-ENTMCNC: 33.9 GM/DL — SIGNIFICANT CHANGE UP (ref 32–36)
MCV RBC AUTO: 90.7 FL — SIGNIFICANT CHANGE UP (ref 80–100)
MONOCYTES # BLD AUTO: 0.5 K/UL — SIGNIFICANT CHANGE UP (ref 0–0.9)
MONOCYTES NFR BLD AUTO: 8.8 % — SIGNIFICANT CHANGE UP (ref 2–14)
MRSA PCR RESULT.: SIGNIFICANT CHANGE UP
NEUTROPHILS # BLD AUTO: 3.43 K/UL — SIGNIFICANT CHANGE UP (ref 1.8–7.4)
NEUTROPHILS NFR BLD AUTO: 60.5 % — SIGNIFICANT CHANGE UP (ref 43–77)
PLATELET # BLD AUTO: 212 K/UL — SIGNIFICANT CHANGE UP (ref 150–400)
POTASSIUM SERPL-MCNC: 4.7 MMOL/L — SIGNIFICANT CHANGE UP (ref 3.5–5.3)
POTASSIUM SERPL-SCNC: 4.7 MMOL/L — SIGNIFICANT CHANGE UP (ref 3.5–5.3)
PROTHROM AB SERPL-ACNC: 13.7 SEC — HIGH (ref 10.6–13.6)
RBC # BLD: 4.85 M/UL — SIGNIFICANT CHANGE UP (ref 4.2–5.8)
RBC # FLD: 12.8 % — SIGNIFICANT CHANGE UP (ref 10.3–14.5)
S AUREUS DNA NOSE QL NAA+PROBE: SIGNIFICANT CHANGE UP
SODIUM SERPL-SCNC: 139 MMOL/L — SIGNIFICANT CHANGE UP (ref 135–145)
WBC # BLD: 5.67 K/UL — SIGNIFICANT CHANGE UP (ref 3.8–10.5)
WBC # FLD AUTO: 5.67 K/UL — SIGNIFICANT CHANGE UP (ref 3.8–10.5)

## 2021-06-30 PROCEDURE — 80048 BASIC METABOLIC PNL TOTAL CA: CPT

## 2021-06-30 PROCEDURE — 86850 RBC ANTIBODY SCREEN: CPT

## 2021-06-30 PROCEDURE — G0463: CPT | Mod: 25

## 2021-06-30 PROCEDURE — 71046 X-RAY EXAM CHEST 2 VIEWS: CPT

## 2021-06-30 PROCEDURE — 85730 THROMBOPLASTIN TIME PARTIAL: CPT

## 2021-06-30 PROCEDURE — 71046 X-RAY EXAM CHEST 2 VIEWS: CPT | Mod: 26

## 2021-06-30 PROCEDURE — 86901 BLOOD TYPING SEROLOGIC RH(D): CPT

## 2021-06-30 PROCEDURE — 83036 HEMOGLOBIN GLYCOSYLATED A1C: CPT

## 2021-06-30 PROCEDURE — 87641 MR-STAPH DNA AMP PROBE: CPT

## 2021-06-30 PROCEDURE — 85025 COMPLETE CBC W/AUTO DIFF WBC: CPT

## 2021-06-30 PROCEDURE — 82040 ASSAY OF SERUM ALBUMIN: CPT

## 2021-06-30 PROCEDURE — 85610 PROTHROMBIN TIME: CPT

## 2021-06-30 PROCEDURE — 86900 BLOOD TYPING SEROLOGIC ABO: CPT

## 2021-06-30 PROCEDURE — 36415 COLL VENOUS BLD VENIPUNCTURE: CPT

## 2021-06-30 PROCEDURE — 87640 STAPH A DNA AMP PROBE: CPT

## 2021-06-30 NOTE — H&P PST ADULT - HEMATOLOGY/LYMPHATICS COMMENTS
on xarelto for DVT and PE- mother and sister also has DVT's- negative genetic testing/ Follows with Dr Garcia

## 2021-06-30 NOTE — H&P PST ADULT - ASSESSMENT
66 year old man presents to [facility] for preprocedure exam. Patient is for planned left TKR with Dr Nayak on .         Plan:  - PST instructions given ; NPO status instructions to be given by ASU .  - Pt instructed to take following meds with sip of water : metoprolol and enalapril   - Pt instructed to take routine evening medications unless indicated .  - Follow cardiologist instructions re:Xarelto and Aspirin- patient with personal history of unprovoked DVT and PE- as per patient he is getting a filter placement with Dr Garnica  -  Stop NSAIDS ( Aspirin Alev Motrin Mobic Diclofenac), herbal supplements , MVI , Vitamin fish oil 7 days prior to surgery  unless   directed by surgeon or cardiologist;   - Medical Optimization  with Dr Irvin  - EZ wash instructions given & mupirocin instructions given. Instructed to use Mupirocin on ly if he gets a phone call from PST staff   - Labs EKG CXR as per surgeon request.   -  Pt instructed to self quarantine .  - Covid Testing scheduled     CAPRINI SCORE [CLOT]    AGE RELATED RISK FACTORS                                                       MOBILITY RELATED FACTORS  [ ] Age 41-60 years                                            (1 Point)                  [ ] Bed rest                                                        (1 Point)  [x ] Age: 61-74 years                                           (2 Points)                 [ ] Plaster cast                                                   (2 Points)  [ ] Age= 75 years                                              (3 Points)                 [ ] Bed bound for more than 72 hours                 (2 Points)    DISEASE RELATED RISK FACTORS                                               GENDER SPECIFIC FACTORS  [ ] Edema in the lower extremities                       (1 Point)                  [ ] Pregnancy                                                     (1 Point)  [ ] Varicose veins                                               (1 Point)                  [ ] Post-partum < 6 weeks                                   (1 Point)             [x ] BMI > 25 Kg/m2                                            (1 Point)                  [ ] Hormonal therapy  or oral contraception          (1 Point)                 [ ] Sepsis (in the previous month)                        (1 Point)                  [ ] History of pregnancy complications                 (1 point)  [ ] Pneumonia or serious lung disease                                               [ ] Unexplained or recurrent                     (1 Point)           (in the previous month)                               (1 Point)  [ ] Abnormal pulmonary function test                     (1 Point)                 SURGERY RELATED RISK FACTORS  [ ] Acute myocardial infarction                              (1 Point)                 [ ]  Section                                             (1 Point)  [ ] Congestive heart failure (in the previous month)  (1 Point)               [ ] Minor surgery                                                  (1 Point)   [ ] Inflammatory bowel disease                             (1 Point)                 [ ] Arthroscopic surgery                                        (2 Points)  [ ] Central venous access                                      (2 Points)                [ ] General surgery lasting more than 45 minutes   (2 Points)       [ ] Stroke (in the previous month)                          (5 Points)               [x ] Elective arthroplasty                                         (5 Points)                                                                                                                                               HEMATOLOGY RELATED FACTORS                                                 TRAUMA RELATED RISK FACTORS  [ x] Prior episodes of VTE                                     (3 Points)                [ ] Fracture of the hip, pelvis, or leg                       (5 Points)  [x ] Positive family history for VTE                         (3 Points)                 [ ] Acute spinal cord injury (in the previous month)  (5 Points)  [ ] Prothrombin 02137 A                                     (3 Points)                 [ ] Paralysis  (less than 1 month)                             (5 Points)  [ ] Factor V Leiden                                             (3 Points)                  [ ] Multiple Trauma within 1 month                        (5 Points)  [ ] Lupus anticoagulants                                     (3 Points)                                                           [ ] Anticardiolipin antibodies                               (3 Points)                                                       [ ] High homocysteine in the blood                      (3 Points)                                             [ ] Other congenital or acquired thrombophilia      (3 Points)                                                [ ] Heparin induced thrombocytopenia                  (3 Points)                                          Total Score [      12    ]    Caprini Score 0 - 2:  Low Risk, No VTE Prophylaxis required for most patients, encourage ambulation  Caprini Score 3 - 6:  At Risk, pharmacologic VTE prophylaxis is indicated for most patients (in the absence of a contraindication)  Caprini Score Greater than or = 7:  High Risk, pharmacologic VTE prophylaxis is indicated for most patients (in the absence of a contraindication)

## 2021-06-30 NOTE — H&P PST ADULT - NSICDXPASTMEDICALHX_GEN_ALL_CORE_FT
PAST MEDICAL HISTORY:  CAD (coronary artery disease)     Demyelinating neuropathy     DM (diabetes mellitus)     DVT (deep venous thrombosis)     HLD (hyperlipidemia)     HTN (hypertension)     MI, old x2- stentx1    LEA (obstructive sleep apnea) CPAP    Osteoarthritis     Personal history of PE (pulmonary embolism)      PAST MEDICAL HISTORY:  CAD (coronary artery disease)     Demyelinating neuropathy     DM (diabetes mellitus)     DVT (deep venous thrombosis) unprovoked DVT/PE- as per patient his mother and sister also had DVT- genetic testing was done but negative    HLD (hyperlipidemia)     HTN (hypertension)     MI, old x2- stentx1    LEA (obstructive sleep apnea) CPAP    Osteoarthritis     Personal history of PE (pulmonary embolism)

## 2021-06-30 NOTE — H&P PST ADULT - HISTORY OF PRESENT ILLNESS
66 year old man presents to [facility] for preprocedure exam. Patient is for planned left TKR with Dr Nayak on 7/9. Patient complaining of left knee pain - worse with activity better when he rests. He does not take anything for pain. patient on Xarelto for history of VTE and PE- as per patient he is scheduled to have filter placement with Dr Garnica next week.

## 2021-06-30 NOTE — H&P PST ADULT - HEALTH CARE MAINTENANCE
Denies travel outside of state or country in the last 14 days.   Denies contact with known Coronavirus positive person.  Denies fever, chills, cough, congestion, runny nose, SOB or difficulty breathing, fatigue, muscle or body ache, headache. loss of taste or smell, N/V/D.    COVID- Pfizer- completed February 9 2021

## 2021-07-01 DIAGNOSIS — Z01.818 ENCOUNTER FOR OTHER PREPROCEDURAL EXAMINATION: ICD-10-CM

## 2021-07-01 DIAGNOSIS — M17.12 UNILATERAL PRIMARY OSTEOARTHRITIS, LEFT KNEE: ICD-10-CM

## 2021-07-06 ENCOUNTER — APPOINTMENT (OUTPATIENT)
Dept: DISASTER EMERGENCY | Facility: CLINIC | Age: 66
End: 2021-07-06

## 2021-07-06 DIAGNOSIS — Z01.818 ENCOUNTER FOR OTHER PREPROCEDURAL EXAMINATION: ICD-10-CM

## 2021-07-06 LAB — SARS-COV-2 N GENE NPH QL NAA+PROBE: NOT DETECTED

## 2021-07-09 ENCOUNTER — TRANSCRIPTION ENCOUNTER (OUTPATIENT)
Age: 66
End: 2021-07-09

## 2021-07-09 ENCOUNTER — INPATIENT (INPATIENT)
Facility: HOSPITAL | Age: 66
LOS: 1 days | Discharge: HOME | DRG: 470 | End: 2021-07-11
Attending: ORTHOPAEDIC SURGERY | Admitting: ORTHOPAEDIC SURGERY
Payer: MEDICARE

## 2021-07-09 ENCOUNTER — RESULT REVIEW (OUTPATIENT)
Age: 66
End: 2021-07-09

## 2021-07-09 VITALS
TEMPERATURE: 98 F | HEART RATE: 59 BPM | RESPIRATION RATE: 16 BRPM | WEIGHT: 274.92 LBS | SYSTOLIC BLOOD PRESSURE: 148 MMHG | HEIGHT: 70 IN | OXYGEN SATURATION: 96 % | DIASTOLIC BLOOD PRESSURE: 85 MMHG

## 2021-07-09 DIAGNOSIS — Z90.49 ACQUIRED ABSENCE OF OTHER SPECIFIED PARTS OF DIGESTIVE TRACT: Chronic | ICD-10-CM

## 2021-07-09 DIAGNOSIS — Z98.890 OTHER SPECIFIED POSTPROCEDURAL STATES: Chronic | ICD-10-CM

## 2021-07-09 DIAGNOSIS — Z95.828 PRESENCE OF OTHER VASCULAR IMPLANTS AND GRAFTS: Chronic | ICD-10-CM

## 2021-07-09 DIAGNOSIS — G56.01 CARPAL TUNNEL SYNDROME, RIGHT UPPER LIMB: Chronic | ICD-10-CM

## 2021-07-09 DIAGNOSIS — M17.12 UNILATERAL PRIMARY OSTEOARTHRITIS, LEFT KNEE: ICD-10-CM

## 2021-07-09 LAB
ANION GAP SERPL CALC-SCNC: 5 MMOL/L — SIGNIFICANT CHANGE UP (ref 5–17)
BUN SERPL-MCNC: 12 MG/DL — SIGNIFICANT CHANGE UP (ref 7–23)
CALCIUM SERPL-MCNC: 8.2 MG/DL — LOW (ref 8.5–10.1)
CHLORIDE SERPL-SCNC: 110 MMOL/L — HIGH (ref 96–108)
CO2 SERPL-SCNC: 26 MMOL/L — SIGNIFICANT CHANGE UP (ref 22–31)
CREAT SERPL-MCNC: 0.74 MG/DL — SIGNIFICANT CHANGE UP (ref 0.5–1.3)
GLUCOSE SERPL-MCNC: 186 MG/DL — HIGH (ref 70–99)
HCT VFR BLD CALC: 39.2 % — SIGNIFICANT CHANGE UP (ref 39–50)
HGB BLD-MCNC: 13.3 G/DL — SIGNIFICANT CHANGE UP (ref 13–17)
MCHC RBC-ENTMCNC: 31.4 PG — SIGNIFICANT CHANGE UP (ref 27–34)
MCHC RBC-ENTMCNC: 33.9 GM/DL — SIGNIFICANT CHANGE UP (ref 32–36)
MCV RBC AUTO: 92.7 FL — SIGNIFICANT CHANGE UP (ref 80–100)
PLATELET # BLD AUTO: 184 K/UL — SIGNIFICANT CHANGE UP (ref 150–400)
POTASSIUM SERPL-MCNC: 4.7 MMOL/L — SIGNIFICANT CHANGE UP (ref 3.5–5.3)
POTASSIUM SERPL-MCNC: 5.4 MMOL/L — HIGH (ref 3.5–5.3)
POTASSIUM SERPL-SCNC: 4.7 MMOL/L — SIGNIFICANT CHANGE UP (ref 3.5–5.3)
POTASSIUM SERPL-SCNC: 5.4 MMOL/L — HIGH (ref 3.5–5.3)
RBC # BLD: 4.23 M/UL — SIGNIFICANT CHANGE UP (ref 4.2–5.8)
RBC # FLD: 12.8 % — SIGNIFICANT CHANGE UP (ref 10.3–14.5)
SODIUM SERPL-SCNC: 141 MMOL/L — SIGNIFICANT CHANGE UP (ref 135–145)
WBC # BLD: 10.03 K/UL — SIGNIFICANT CHANGE UP (ref 3.8–10.5)
WBC # FLD AUTO: 10.03 K/UL — SIGNIFICANT CHANGE UP (ref 3.8–10.5)

## 2021-07-09 PROCEDURE — 97162 PT EVAL MOD COMPLEX 30 MIN: CPT | Mod: GP

## 2021-07-09 PROCEDURE — 73560 X-RAY EXAM OF KNEE 1 OR 2: CPT | Mod: 26,LT

## 2021-07-09 PROCEDURE — 88305 TISSUE EXAM BY PATHOLOGIST: CPT

## 2021-07-09 PROCEDURE — 85027 COMPLETE CBC AUTOMATED: CPT

## 2021-07-09 PROCEDURE — 84132 ASSAY OF SERUM POTASSIUM: CPT

## 2021-07-09 PROCEDURE — 97116 GAIT TRAINING THERAPY: CPT | Mod: GP

## 2021-07-09 PROCEDURE — 97530 THERAPEUTIC ACTIVITIES: CPT | Mod: GP

## 2021-07-09 PROCEDURE — 82962 GLUCOSE BLOOD TEST: CPT

## 2021-07-09 PROCEDURE — 86769 SARS-COV-2 COVID-19 ANTIBODY: CPT

## 2021-07-09 PROCEDURE — 80048 BASIC METABOLIC PNL TOTAL CA: CPT

## 2021-07-09 PROCEDURE — C1776: CPT

## 2021-07-09 PROCEDURE — 83735 ASSAY OF MAGNESIUM: CPT

## 2021-07-09 PROCEDURE — 99223 1ST HOSP IP/OBS HIGH 75: CPT

## 2021-07-09 PROCEDURE — 84100 ASSAY OF PHOSPHORUS: CPT

## 2021-07-09 PROCEDURE — C1713: CPT

## 2021-07-09 PROCEDURE — 73560 X-RAY EXAM OF KNEE 1 OR 2: CPT | Mod: LT

## 2021-07-09 PROCEDURE — 85025 COMPLETE CBC W/AUTO DIFF WBC: CPT

## 2021-07-09 PROCEDURE — 99222 1ST HOSP IP/OBS MODERATE 55: CPT

## 2021-07-09 PROCEDURE — 88305 TISSUE EXAM BY PATHOLOGIST: CPT | Mod: 26

## 2021-07-09 PROCEDURE — 36415 COLL VENOUS BLD VENIPUNCTURE: CPT

## 2021-07-09 RX ORDER — FOLIC ACID 0.8 MG
1 TABLET ORAL DAILY
Refills: 0 | Status: DISCONTINUED | OUTPATIENT
Start: 2021-07-09 | End: 2021-07-11

## 2021-07-09 RX ORDER — CEFAZOLIN SODIUM 1 G
2000 VIAL (EA) INJECTION EVERY 8 HOURS
Refills: 0 | Status: COMPLETED | OUTPATIENT
Start: 2021-07-09 | End: 2021-07-10

## 2021-07-09 RX ORDER — HYDROMORPHONE HYDROCHLORIDE 2 MG/ML
0.5 INJECTION INTRAMUSCULAR; INTRAVENOUS; SUBCUTANEOUS EVERY 4 HOURS
Refills: 0 | Status: DISCONTINUED | OUTPATIENT
Start: 2021-07-09 | End: 2021-07-11

## 2021-07-09 RX ORDER — RIVAROXABAN 15 MG-20MG
20 KIT ORAL
Refills: 0 | Status: DISCONTINUED | OUTPATIENT
Start: 2021-07-09 | End: 2021-07-11

## 2021-07-09 RX ORDER — INSULIN LISPRO 100/ML
VIAL (ML) SUBCUTANEOUS AT BEDTIME
Refills: 0 | Status: DISCONTINUED | OUTPATIENT
Start: 2021-07-09 | End: 2021-07-11

## 2021-07-09 RX ORDER — GABAPENTIN 400 MG/1
0 CAPSULE ORAL
Qty: 0 | Refills: 0 | DISCHARGE

## 2021-07-09 RX ORDER — SENNA PLUS 8.6 MG/1
1 TABLET ORAL
Qty: 7 | Refills: 0
Start: 2021-07-09 | End: 2021-07-15

## 2021-07-09 RX ORDER — DEXTROSE 50 % IN WATER 50 %
15 SYRINGE (ML) INTRAVENOUS ONCE
Refills: 0 | Status: DISCONTINUED | OUTPATIENT
Start: 2021-07-09 | End: 2021-07-11

## 2021-07-09 RX ORDER — SIMVASTATIN 20 MG/1
40 TABLET, FILM COATED ORAL AT BEDTIME
Refills: 0 | Status: DISCONTINUED | OUTPATIENT
Start: 2021-07-09 | End: 2021-07-11

## 2021-07-09 RX ORDER — DEXTROSE 50 % IN WATER 50 %
12.5 SYRINGE (ML) INTRAVENOUS ONCE
Refills: 0 | Status: DISCONTINUED | OUTPATIENT
Start: 2021-07-09 | End: 2021-07-11

## 2021-07-09 RX ORDER — HYDROMORPHONE HYDROCHLORIDE 2 MG/ML
0.5 INJECTION INTRAMUSCULAR; INTRAVENOUS; SUBCUTANEOUS
Refills: 0 | Status: DISCONTINUED | OUTPATIENT
Start: 2021-07-09 | End: 2021-07-09

## 2021-07-09 RX ORDER — DEXTROSE 50 % IN WATER 50 %
25 SYRINGE (ML) INTRAVENOUS ONCE
Refills: 0 | Status: DISCONTINUED | OUTPATIENT
Start: 2021-07-09 | End: 2021-07-11

## 2021-07-09 RX ORDER — SODIUM CHLORIDE 9 MG/ML
1000 INJECTION, SOLUTION INTRAVENOUS
Refills: 0 | Status: DISCONTINUED | OUTPATIENT
Start: 2021-07-09 | End: 2021-07-11

## 2021-07-09 RX ORDER — OXYCODONE HYDROCHLORIDE 5 MG/1
10 TABLET ORAL ONCE
Refills: 0 | Status: DISCONTINUED | OUTPATIENT
Start: 2021-07-09 | End: 2021-07-09

## 2021-07-09 RX ORDER — OXYCODONE HYDROCHLORIDE 5 MG/1
1 TABLET ORAL
Qty: 30 | Refills: 0
Start: 2021-07-09

## 2021-07-09 RX ORDER — ASCORBIC ACID 60 MG
500 TABLET,CHEWABLE ORAL
Refills: 0 | Status: DISCONTINUED | OUTPATIENT
Start: 2021-07-09 | End: 2021-07-11

## 2021-07-09 RX ORDER — METOPROLOL TARTRATE 50 MG
25 TABLET ORAL DAILY
Refills: 0 | Status: DISCONTINUED | OUTPATIENT
Start: 2021-07-09 | End: 2021-07-11

## 2021-07-09 RX ORDER — SENNA PLUS 8.6 MG/1
2 TABLET ORAL AT BEDTIME
Refills: 0 | Status: DISCONTINUED | OUTPATIENT
Start: 2021-07-09 | End: 2021-07-11

## 2021-07-09 RX ORDER — ONDANSETRON 8 MG/1
4 TABLET, FILM COATED ORAL ONCE
Refills: 0 | Status: DISCONTINUED | OUTPATIENT
Start: 2021-07-09 | End: 2021-07-09

## 2021-07-09 RX ORDER — ACETAMINOPHEN 500 MG
975 TABLET ORAL EVERY 8 HOURS
Refills: 0 | Status: DISCONTINUED | OUTPATIENT
Start: 2021-07-09 | End: 2021-07-11

## 2021-07-09 RX ORDER — PANTOPRAZOLE SODIUM 20 MG/1
1 TABLET, DELAYED RELEASE ORAL
Qty: 30 | Refills: 0
Start: 2021-07-09 | End: 2021-08-07

## 2021-07-09 RX ORDER — ACETAMINOPHEN 500 MG
1000 TABLET ORAL ONCE
Refills: 0 | Status: DISCONTINUED | OUTPATIENT
Start: 2021-07-09 | End: 2021-07-09

## 2021-07-09 RX ORDER — POLYETHYLENE GLYCOL 3350 17 G/17G
17 POWDER, FOR SOLUTION ORAL AT BEDTIME
Refills: 0 | Status: DISCONTINUED | OUTPATIENT
Start: 2021-07-09 | End: 2021-07-11

## 2021-07-09 RX ORDER — ONDANSETRON 8 MG/1
8 TABLET, FILM COATED ORAL EVERY 8 HOURS
Refills: 0 | Status: DISCONTINUED | OUTPATIENT
Start: 2021-07-09 | End: 2021-07-11

## 2021-07-09 RX ORDER — INSULIN LISPRO 100/ML
VIAL (ML) SUBCUTANEOUS
Refills: 0 | Status: DISCONTINUED | OUTPATIENT
Start: 2021-07-09 | End: 2021-07-11

## 2021-07-09 RX ORDER — GLUCAGON INJECTION, SOLUTION 0.5 MG/.1ML
1 INJECTION, SOLUTION SUBCUTANEOUS ONCE
Refills: 0 | Status: DISCONTINUED | OUTPATIENT
Start: 2021-07-09 | End: 2021-07-11

## 2021-07-09 RX ORDER — OXYCODONE HYDROCHLORIDE 5 MG/1
10 TABLET ORAL EVERY 4 HOURS
Refills: 0 | Status: DISCONTINUED | OUTPATIENT
Start: 2021-07-09 | End: 2021-07-11

## 2021-07-09 RX ORDER — LATANOPROST 0.05 MG/ML
1 SOLUTION/ DROPS OPHTHALMIC; TOPICAL AT BEDTIME
Refills: 0 | Status: DISCONTINUED | OUTPATIENT
Start: 2021-07-09 | End: 2021-07-11

## 2021-07-09 RX ORDER — ACETAMINOPHEN 500 MG
2 TABLET ORAL
Qty: 84 | Refills: 0
Start: 2021-07-09 | End: 2021-07-22

## 2021-07-09 RX ORDER — SODIUM CHLORIDE 9 MG/ML
1000 INJECTION, SOLUTION INTRAVENOUS
Refills: 0 | Status: DISCONTINUED | OUTPATIENT
Start: 2021-07-09 | End: 2021-07-09

## 2021-07-09 RX ORDER — PANTOPRAZOLE SODIUM 20 MG/1
40 TABLET, DELAYED RELEASE ORAL
Refills: 0 | Status: DISCONTINUED | OUTPATIENT
Start: 2021-07-09 | End: 2021-07-11

## 2021-07-09 RX ORDER — CELECOXIB 200 MG/1
200 CAPSULE ORAL EVERY 12 HOURS
Refills: 0 | Status: DISCONTINUED | OUTPATIENT
Start: 2021-07-10 | End: 2021-07-11

## 2021-07-09 RX ORDER — PANTOPRAZOLE SODIUM 20 MG/1
40 TABLET, DELAYED RELEASE ORAL ONCE
Refills: 0 | Status: COMPLETED | OUTPATIENT
Start: 2021-07-09 | End: 2021-07-09

## 2021-07-09 RX ORDER — FERROUS SULFATE 325(65) MG
325 TABLET ORAL DAILY
Refills: 0 | Status: DISCONTINUED | OUTPATIENT
Start: 2021-07-09 | End: 2021-07-11

## 2021-07-09 RX ORDER — POLYETHYLENE GLYCOL 3350 17 G/17G
17 POWDER, FOR SOLUTION ORAL
Qty: 1 | Refills: 0
Start: 2021-07-09

## 2021-07-09 RX ORDER — OXYCODONE HYDROCHLORIDE 5 MG/1
5 TABLET ORAL EVERY 4 HOURS
Refills: 0 | Status: DISCONTINUED | OUTPATIENT
Start: 2021-07-09 | End: 2021-07-11

## 2021-07-09 RX ORDER — GABAPENTIN 400 MG/1
600 CAPSULE ORAL DAILY
Refills: 0 | Status: DISCONTINUED | OUTPATIENT
Start: 2021-07-09 | End: 2021-07-11

## 2021-07-09 RX ORDER — FENTANYL CITRATE 50 UG/ML
50 INJECTION INTRAVENOUS
Refills: 0 | Status: DISCONTINUED | OUTPATIENT
Start: 2021-07-09 | End: 2021-07-09

## 2021-07-09 RX ADMIN — RIVAROXABAN 20 MILLIGRAM(S): KIT at 17:45

## 2021-07-09 RX ADMIN — Medication 100 MILLIGRAM(S): at 16:50

## 2021-07-09 RX ADMIN — OXYCODONE HYDROCHLORIDE 10 MILLIGRAM(S): 5 TABLET ORAL at 22:37

## 2021-07-09 RX ADMIN — SODIUM CHLORIDE 75 MILLILITER(S): 9 INJECTION, SOLUTION INTRAVENOUS at 22:08

## 2021-07-09 RX ADMIN — PANTOPRAZOLE SODIUM 40 MILLIGRAM(S): 20 TABLET, DELAYED RELEASE ORAL at 06:34

## 2021-07-09 RX ADMIN — LATANOPROST 1 DROP(S): 0.05 SOLUTION/ DROPS OPHTHALMIC; TOPICAL at 22:23

## 2021-07-09 RX ADMIN — Medication 500 MILLIGRAM(S): at 22:07

## 2021-07-09 RX ADMIN — OXYCODONE HYDROCHLORIDE 10 MILLIGRAM(S): 5 TABLET ORAL at 22:07

## 2021-07-09 RX ADMIN — Medication 975 MILLIGRAM(S): at 22:07

## 2021-07-09 RX ADMIN — Medication 1: at 17:07

## 2021-07-09 RX ADMIN — Medication 1 TABLET(S): at 22:07

## 2021-07-09 RX ADMIN — OXYCODONE HYDROCHLORIDE 5 MILLIGRAM(S): 5 TABLET ORAL at 16:50

## 2021-07-09 RX ADMIN — SIMVASTATIN 40 MILLIGRAM(S): 20 TABLET, FILM COATED ORAL at 22:07

## 2021-07-09 RX ADMIN — SENNA PLUS 2 TABLET(S): 8.6 TABLET ORAL at 22:07

## 2021-07-09 NOTE — CONSULT NOTE ADULT - ASSESSMENT
This is a 66 year old male s/p left total knee replacment 7/9 with significant medical history of DVT/PE about two years ago with + family history VTE as well on Xarelto 20 mg PO daily. Heme workup proved to be negative, and VTE unprovoked. This make him high risk for VTE especially in the post-operative setting with high BMI and impaired mobility. Will resume Xarelto tonight at 6pm and continue daily. He is low risk for bleeding.    Plan:  ::Xarelto 20mg PO daily, resume tonight 7/9 @ 6pm  ::Daily CBC/BMP  ::Venodynes b/l  ::Enc ambulation    Thank you for this consult, will continue to follow.  Dispo: Home

## 2021-07-09 NOTE — DISCHARGE NOTE NURSING/CASE MANAGEMENT/SOCIAL WORK - NSDCPEXARELTOFU_GEN_ALL_CORE
41.9 Go for blood tests as directed. Your doctor will do lab tests at regular visits to monitor the effects of this medicine. Please follow up with your doctor and keep your health care provider appointments.

## 2021-07-09 NOTE — PROGRESS NOTE ADULT - SUBJECTIVE AND OBJECTIVE BOX
Orthopedics Post-op Check:    This is a 65y/o Male s/p Left Total Knee Arthroplasty POD 0.  Pain is controlled. Pt feeling well. No nausea or vomiting. Pt was up OOB today with PT.    Vital Signs Last 24 Hrs  T(C): 36.2 (09 Jul 2021 11:33), Max: 36.8 (09 Jul 2021 06:19)  T(F): 97.2 (09 Jul 2021 11:33), Max: 98.2 (09 Jul 2021 06:19)  HR: 65 (09 Jul 2021 12:30) (59 - 75)  BP: 113/60 (09 Jul 2021 12:30) (113/60 - 161/64)  BP(mean): --  RR: 14 (09 Jul 2021 12:30) (13 - 20)  SpO2: 100% (09 Jul 2021 12:30) (90% - 100%)                        13.3   10.03 )-----------( 184      ( 09 Jul 2021 11:47 )             39.2     09 Jul 2021 11:47    141    |  110    |  12     ----------------------------<  186    5.4     |  26     |  0.74     Ca    8.2        09 Jul 2021 11:47      Exam:  NAD AAOx3.  Dressing clean, dry and intact.  SCDs pumps in place.  Calves are soft and nontender.  Moving all toes and ankle, +EHL/FHL/TA/GS.  SILT throughout.  DP and PT pulses 2+.    A/P:  Stable POD 0 Left Total Knee Arthroplasty  -Analgesia  -LLE elevation  -Ice application  -Prophylactic antibiotics  -DVT PE prophylaxis  -Incentive spirometry  -OOB PT WBAT LLE       Orthopedics Post-op Check:    This is a 67y/o Male s/p Left Total Knee Arthroplasty POD 0.  Pain is controlled. Pt feeling well. No nausea or vomiting.     Vital Signs Last 24 Hrs  T(C): 36.2 (09 Jul 2021 11:33), Max: 36.8 (09 Jul 2021 06:19)  T(F): 97.2 (09 Jul 2021 11:33), Max: 98.2 (09 Jul 2021 06:19)  HR: 65 (09 Jul 2021 12:30) (59 - 75)  BP: 113/60 (09 Jul 2021 12:30) (113/60 - 161/64)  BP(mean): --  RR: 14 (09 Jul 2021 12:30) (13 - 20)  SpO2: 100% (09 Jul 2021 12:30) (90% - 100%)                        13.3   10.03 )-----------( 184      ( 09 Jul 2021 11:47 )             39.2     09 Jul 2021 11:47    141    |  110    |  12     ----------------------------<  186    5.4     |  26     |  0.74     Ca    8.2        09 Jul 2021 11:47      Exam:  NAD AAOx3.  Dressing clean, dry and intact.  SCDs pumps in place.  Calves are soft and nontender.  Moving all toes and ankle, +EHL/FHL/TA/GS.  SILT throughout.  DP and PT pulses 2+.    A/P:  Stable POD 0 Left Total Knee Arthroplasty  -Analgesia  -LLE elevation  -Ice application  -Prophylactic antibiotics  -DVT PE prophylaxis  -Incentive spirometry  -OOB PT WBAT LLE

## 2021-07-09 NOTE — DISCHARGE NOTE NURSING/CASE MANAGEMENT/SOCIAL WORK - PATIENT PORTAL LINK FT
You can access the FollowMyHealth Patient Portal offered by [facility] by registering at the following website: [email or web address]. By joining [facility]’s FollowMyHealth portal, you will also be able to view your health information using other applications (apps) compatible with our system.

## 2021-07-09 NOTE — PHYSICAL THERAPY INITIAL EVALUATION ADULT - LEVEL OF INDEPENDENCE: GAIT, REHAB EVAL
Min A for Facilitation of L quad set, due to buckling at L mid stance. prob due to the nerve block at this time./minimum assist (75% patients effort)

## 2021-07-09 NOTE — PHYSICAL THERAPY INITIAL EVALUATION ADULT - PERTINENT HX OF CURRENT PROBLEM, REHAB EVAL
Pt is a 67 y/o M, To  for elective L TKA  after failed conservative tx. PMH of HTN, HLD, CAD with MI x 2 with stent x1, CIDP on monthly solumedrol infusions. last Infusion Friday 7/2, DMII, H/O DVT/PE on Xarelto

## 2021-07-09 NOTE — DISCHARGE NOTE PROVIDER - CARE PROVIDER_API CALL
Wil Nayak)  Orthopaedic Surgery  [address] [place] [address] [place] [address]  Phone: [phone]  Fax: [phone]  Follow Up Time:

## 2021-07-09 NOTE — DISCHARGE NOTE PROVIDER - HOSPITAL COURSE
H&P:  Pt is a66y Male     Now s/p Left Total Knee Arthroplasty. Pt is afebrile with stable vital signs. Pain is controlled. Alert and Oriented. Exam reveals intact EHL FHL TA GS, +DP. Dressing is clean and dry with a new bandage on.  Primary osteoarthritis of left knee    FH: pancreatic cancer (Father)    HLD (hyperlipidemia)    CAD (coronary artery disease)    MI, old    HTN (hypertension)    LEA (obstructive sleep apnea)    Demyelinating neuropathy    DVT (deep venous thrombosis)    Personal history of PE (pulmonary embolism)    DM (diabetes mellitus)    Osteoarthritis    Demyelinating neuropathy    History of cholecystectomy    H/O Achilles tendon repair    History of percutaneous coronary intervention    S/P IVC filter    Carpal tunnel syndrome of right wrist      Hospital Course:  Patient presented to [facility] medically cleared for elective Left Knee Replacement Surgery, having failed outpatient conservative management. Prophylactic antibiotics were started before the procedure and continued for 24 hours. They were admitted after surgery to the orthopedic floor.   There were no complications during the hospital stay. All home medications were continued.     Routine consults were obtained from the Anticoagulation Team for DVT/PE prophylaxis, from Physical Therapy for twice daily PT, and from the Hospitalist for Medical Co-management. Patient was placed on anticoagulation.  Pertinent home medications were continued.  Daily labs were followed.      On POD 0 pt was stable overnight. No major events post op. Pt received twice daily PT and a new dressing was applied if needed prior to discharge. The plan is for DC to home with home PT. The orthopedic Attending is aware and agrees.     H&P:  Pt is a66y Male     Now s/p Left Total Knee Arthroplasty. Pt is afebrile with stable vital signs. Pain is controlled. Alert and Oriented. Exam reveals intact EHL FHL TA GS, +DP. Dressing is clean and dry with a new bandage on.  Primary osteoarthritis of left knee    FH: pancreatic cancer (Father)    HLD (hyperlipidemia)    CAD (coronary artery disease)    MI, old    HTN (hypertension)    LEA (obstructive sleep apnea)    Demyelinating neuropathy    DVT (deep venous thrombosis)    Personal history of PE (pulmonary embolism)    DM (diabetes mellitus)    Osteoarthritis    Demyelinating neuropathy    History of cholecystectomy    H/O Achilles tendon repair    History of percutaneous coronary intervention    S/P IVC filter    Carpal tunnel syndrome of right wrist      Hospital Course:  Patient presented to [facility] medically cleared for elective Left Knee Replacement Surgery, having failed outpatient conservative management. Prophylactic antibiotics were started before the procedure and continued for 24 hours. They were admitted after surgery to the orthopedic floor. There were no complications during the hospital stay. All home medications were continued.     Routine consults were obtained from the Anticoagulation Team for DVT/PE prophylaxis, from Physical Therapy for twice daily PT, and from the Hospitalist for Medical Co-management. Patient was placed on anticoagulation.  Pertinent home medications were continued.  Daily labs were followed.      On POD 0 pt was stable overnight. No major events post op. Pt received twice daily PT and a new dressing was applied if needed prior to discharge. The plan is for DC to home with home PT. The orthopedic Attending is aware and agrees.

## 2021-07-09 NOTE — CONSULT NOTE ADULT - SUBJECTIVE AND OBJECTIVE BOX
PCP: Dr kimani mcelroy    CHIEF COMPLAINT:  inc left knee OA    HISTORY OF THE PRESENT ILLNESS: this is a 67 yo male with PMH of HTN, HLD, CAD with MI x 2 with stent , demyelinating neuropathy, DMII, H/O DVT/PE on Xarelto ( has familial h/o VTE although heme hypercoag workup was negative), recent placement of IVC filter, LEA uses CPAP and OA of the left knee with progressive pain with ambulation, climbing stairs and INC pain at night. He failed the usual modalities for knee pain and is now s/p right TKR.  Pt is seen in PACU, IN NAD, denies any CP or SOB.  We are consulted for medical management    PAST MEDICAL HISTORY: as above    PAST SURGICAL HISTORY: achilles tendon repair, stephanie, PCI with coronary stent placed in RCA in 2008    FAMILY HISTORY:   father dec age unknown Pacreatic CA,  + FH of VTE    SOCIAL HISTORY:  former smoker quit 35 years ago, social alcohol, no drugs    ALLERGIES:  NKDA    HOME MEDS: see med rec    REVIEW OF SYSTEMS:   All 10 systems reviewed in detailed and found to be negative with the exception of what has already been described above    MEDICATIONS  (STANDING):  lactated ringers. 1000 milliLiter(s) (125 mL/Hr) IV Continuous <Continuous>    MEDICATIONS  (PRN):  acetaminophen  IVPB .. 1000 milliGRAM(s) IV Intermittent once PRN Mild Pain (1 - 3)  fentaNYL    Injectable 50 MICROGram(s) IV Push every 15 minutes PRN Severe Pain (7 - 10)  HYDROmorphone  Injectable 0.5 milliGRAM(s) IV Push every 10 minutes PRN Moderate Pain (4 - 6)  ondansetron Injectable 4 milliGRAM(s) IV Push once PRN Nausea and/or Vomiting  oxyCODONE    IR 10 milliGRAM(s) Oral once PRN Severe Pain (7 - 10)      VITALS:  T(F): 97.2 (07-09-21 @ 11:33), Max: 98.2 (07-09-21 @ 06:19)  HR: 67 (07-09-21 @ 11:45) (59 - 75)  BP: 148/63 (07-09-21 @ 11:45) (145/74 - 161/64)  RR: 16 (07-09-21 @ 11:45) (15 - 20)  SpO2: 100% (07-09-21 @ 11:45) (90% - 100%)  Wt(kg): --    I&O's Summary    09 Jul 2021 07:01  -  09 Jul 2021 12:19  --------------------------------------------------------  IN: 1900 mL / OUT: 0 mL / NET: 1900 mL        CAPILLARY BLOOD GLUCOSE      POCT Blood Glucose.: 161 mg/dL (09 Jul 2021 11:29)  POCT Blood Glucose.: 121 mg/dL (09 Jul 2021 08:44)  POCT Blood Glucose.: 133 mg/dL (09 Jul 2021 07:17)    PHYSICAL EXAM:    GENERAL: Comfortable, no acute distress   HEAD:  Normocephalic, atraumatic  EYES: EOMI, PERRLA  HEENT: Moist mucous membranes  NECK: Supple, No JVD  NERVOUS SYSTEM:  Alert & Oriented X3, Motor Strength 5/5 B/L upper and lower extremities  CHEST/LUNG: Clear to auscultation bilaterally  HEART: Regular rate and rhythm  ABDOMEN: Soft, non tender, Nondistended, Bowel sounds present  GENITOURINARY: Voiding, no palpable bladder  EXTREMITIES:   No clubbing, cyanosis, or edema  MUSCULOSKELETAL- left knee dsg c/d/i  SKIN-no rash            LABS:                            13.3   10.03 )-----------( 184      ( 09 Jul 2021 11:47 )             39.2     07-09    141  |  110<H>  |  12  ----------------------------<  186<H>  5.4<H>   |  26  |  0.74    Ca    8.2<L>      09 Jul 2021 11:47        IMPRESSION: 67 yo male with above pmh a/w:  #left knee OA  # S/P left TKR    POD#0  pain control  PT eval  Bowel regimen  IVF's  Finish ABXs  regular diet  PPI  VTE prophylaxis  monitor CBC/BMP      #CAD with stents    cont ASA    # HTN  cont BB/ACE-i  hold diuretic    # HLD  cont statin    # Hyperkalemia    K+ 5.4 post op    recheck K+ at 1600    #DMII  hold OHAS  BGM/SS    # Vte prophylaxis/H/O DVT/PE    recent IVC filter placement  AC consult: resume Xarelto  Venodynes  INC mobility      Thank you for the consult, will follow         PCP: Dr kimani mcelroy    CHIEF COMPLAINT:  inc left knee OA    HISTORY OF THE PRESENT ILLNESS: this is a 65 yo male with PMH of HTN, HLD, CAD with MI x 2 with stent x1, CIDP on monthly solumedrol infusions. last Infusion Friday 7/2, DMII, H/O DVT/PE on Xarelto ( has familial h/o VTE although heme hypercoag workup was negative), last Xarelto dose 7/5,  placement of IVC filter yesterday, LEA uses CPAP and OA of the left knee with progressive pain with ambulation, climbing stairs and INC pain at night. He failed the usual modalities for knee pain and is now s/p right TKR.  Pt is seen in PACU, IN NAD, denies any CP or SOB.  We are consulted for medical management    PAST MEDICAL HISTORY: as above    PAST SURGICAL HISTORY: achilles tendon repair, stephanie, PCI with coronary stent placed in RCA in 2008    FAMILY HISTORY:   father dec age unknown Pacreatic CA,  + FH of VTE    SOCIAL HISTORY:  former smoker quit 35 years ago, social alcohol, no drugs    ALLERGIES:  NKDA    HOME MEDS: see med rec    REVIEW OF SYSTEMS:   All 10 systems reviewed in detailed and found to be negative with the exception of what has already been described above    MEDICATIONS  (STANDING):  lactated ringers. 1000 milliLiter(s) (125 mL/Hr) IV Continuous <Continuous>    MEDICATIONS  (PRN):  acetaminophen  IVPB .. 1000 milliGRAM(s) IV Intermittent once PRN Mild Pain (1 - 3)  fentaNYL    Injectable 50 MICROGram(s) IV Push every 15 minutes PRN Severe Pain (7 - 10)  HYDROmorphone  Injectable 0.5 milliGRAM(s) IV Push every 10 minutes PRN Moderate Pain (4 - 6)  ondansetron Injectable 4 milliGRAM(s) IV Push once PRN Nausea and/or Vomiting  oxyCODONE    IR 10 milliGRAM(s) Oral once PRN Severe Pain (7 - 10)      VITALS:  T(F): 97.2 (07-09-21 @ 11:33), Max: 98.2 (07-09-21 @ 06:19)  HR: 67 (07-09-21 @ 11:45) (59 - 75)  BP: 148/63 (07-09-21 @ 11:45) (145/74 - 161/64)  RR: 16 (07-09-21 @ 11:45) (15 - 20)  SpO2: 100% (07-09-21 @ 11:45) (90% - 100%)  Wt(kg): --    I&O's Summary    09 Jul 2021 07:01  -  09 Jul 2021 12:19  --------------------------------------------------------  IN: 1900 mL / OUT: 0 mL / NET: 1900 mL        CAPILLARY BLOOD GLUCOSE      POCT Blood Glucose.: 161 mg/dL (09 Jul 2021 11:29)  POCT Blood Glucose.: 121 mg/dL (09 Jul 2021 08:44)  POCT Blood Glucose.: 133 mg/dL (09 Jul 2021 07:17)    PHYSICAL EXAM:    GENERAL: Comfortable, no acute distress   HEAD:  Normocephalic, atraumatic  EYES: EOMI, PERRLA  HEENT: Moist mucous membranes  NECK: Supple, No JVD  NERVOUS SYSTEM:  Alert & Oriented X3, Motor Strength 5/5 B/L upper and lower extremities  CHEST/LUNG: Clear to auscultation bilaterally  HEART: Regular rate and rhythm  ABDOMEN: Soft, non tender, Nondistended, Bowel sounds present  GENITOURINARY: Voiding, no palpable bladder  EXTREMITIES:   No clubbing, cyanosis, or edema  MUSCULOSKELETAL- left knee dsg c/d/i, + sensation, + dorsiflexion  SKIN-no rash            LABS:                            13.3   10.03 )-----------( 184      ( 09 Jul 2021 11:47 )             39.2     07-09    141  |  110<H>  |  12  ----------------------------<  186<H>  5.4<H>   |  26  |  0.74    Ca    8.2<L>      09 Jul 2021 11:47        IMPRESSION: 65 yo male with above pmh a/w:  #left knee OA  # S/P left TKR    POD#0  pain control  PT eval  Bowel regimen  IVF's  Finish ABXs  regular diet  PPI  VTE prophylaxis  monitor CBC/BMP    #CIDP   cont gabapentin      #CAD with stents    cont ASA    # HTN  cont BB/ACE-i  hold diuretic    # HLD  cont statin    # Hyperkalemia    K+ 5.4 post op    recheck K+ at 1600    #LEA   monitor O2 sats    supple O2 prn    #DMII  hold OHAS  BGM/SS    # Vte prophylaxis/H/O DVT/PE    recent IVC filter placement  AC consult: resume Xarelto  Venodynes  INC mobility      Thank you for the consult, will follow

## 2021-07-09 NOTE — PHYSICAL THERAPY INITIAL EVALUATION ADULT - ACTIVE RANGE OF MOTION EXAMINATION, REHAB EVAL
L knee NT, PROM 0-approx 90 at dangle/bilateral upper extremity Active ROM was WFL (within functional limits)/bilateral  lower extremity Active ROM was WFL (within functional limits)

## 2021-07-09 NOTE — PATIENT PROFILE ADULT - .
PATIENT CALLED THIS MORNING REQUESTING THAT WE MAIL HIM HIS RECENT LAB RESULTS TO VERIFIED ADDRESS.  [facility]/AM  
09-Jul-2021 06:31:44

## 2021-07-09 NOTE — PHYSICAL THERAPY INITIAL EVALUATION ADULT - LIVES WITH, PROFILE
Pt lives with Iva in a house w/ 2 CYNDI and resides on the main floor. Pt states Iva can assist in his recovery.

## 2021-07-09 NOTE — DISCHARGE NOTE PROVIDER - NSDCFUADDINST_GEN_ALL_CORE_FT
Discharge Instructions for Total Knee Arthroplasty:    1. ACTIVITY: WBAT, Rolling walker, Daily PT. Gentle ROM 0-full as tolerated. Walk plenty.  Knee Immobilizer at night time only for 3 weeks.  2. CALL FOR: fever over 101, wound redness, drainage or open area, calf pain/calf swelling.  3. BANDAGE: Change dressing to a new Mepilex Ag bandage POD7 (7/16). May change sooner if dressing saturated or falling off. DO NOT REMOVE BANDAGE TO CHECK WOUND ON INTAKE.  4. SHOWER: Okay to shower. Do not scrub over dressing or submerge. No baths or hot tub.   5. STAPLES: RN Remove Staples POD14 (7/23).  6. DVT PE PROPHYLAXIS: Managed by Anticoag Team. See Med Rec.  7. GI: Continue Protonix daily while on Anticoagulant. an eRx has been sent to your pharmacy.  8. LABS:  INR if on Coumadin.  9. FOLLOW UP: Dr. Nayak in 1 month. Call to schedule.  10. MEDICATIONS:  If going home, eRX sent to your pharmacy for .   11.**Call office if medications not covered under your insurance, especially BLOOD CLOT PREVENTION/anticoagulant medication.

## 2021-07-09 NOTE — CONSULT NOTE ADULT - ATTENDING COMMENTS
Patient is seen and examined at bedside with NP Isabelle Mack  65yo/M who presented for elective LT TKR  Seen postop, was up with PT  Hemodynamically stable  Will continue to mobilize with PT, monitor HH, incentive spirometry, pain meds  Agree with above assessment and plan. D/w pt and family at bedside, ortho team

## 2021-07-09 NOTE — CONSULT NOTE ADULT - SUBJECTIVE AND OBJECTIVE BOX
HPI:  Patient is a 66y old  Male who presents with a chief complaint of left knee pain s/p left total knee replacement 21.    Consulted by Dr. Nayak for VTE prophylaxis, risk stratification, and anticoagulation management.    PAST MEDICAL & SURGICAL HISTORY:  HLD (hyperlipidemia)  CAD (coronary artery disease)  MI, old  x2- [id number] (hypertension)    LEA (obstructive sleep apnea)  CPAP  Demyelinating neuropathy  DVT (deep venous thrombosis)  unprovoked DVT/PE- as per patient his mother and sister also had DVT- genetic testing was done but negative    Personal history of PE (pulmonary embolism)    DM (diabetes mellitus)    Osteoarthritis    History of cholecystectomy    H/O Achilles tendon repair    History of percutaneous coronary intervention  RCA stent     S/P IVC filter  2021    Carpal tunnel syndrome of right wrist      Interval History:  21: Patient seen at bedside in PACU, awake and alert and able to communicate effectively . reports ;pain as "1 out of 10." Discussed history of VTE. He has shortness of breath x one week about two years ago and upon ED eval with + pulmonary embolism and DVT's lower extremities. His mother and sister both have history of DVT's and Mom had IVC filter and sister also on oral AC. He denies any more events. He has not taken his Xarelto for 5 days now. Heme- Dr. Garcia follows. He had full genetic workup and all negative. He is on Xarelto 20 mg daily at home.     BMI: 39.5    CrCl:172.2    Incision Time:0812  Procedure End Time:1130  EBL:200ml    Caprini VTE Risk Score:  CAPRINI SCORE  AGE RELATED RISK FACTORS                                                       MOBILITY RELATED FACTORS  [ ] Age 41-60 years                                            (1 Point)                  [ ] Bed rest /restricted mobility                      (1 Point)  [ x] Age: 61-74 years                                           (2 Points)                [ ] Plaster cast                                                   (2 Points)  [ ] Age= 75 years                                              (3 Points)                 [ ] Bed bound for more than 72 hours                   (2 Points)    DISEASE RELATED RISK FACTORS                                               GENDER SPECIFIC FACTORS  [ ] Edema in the lower extremities                       (1 Point)           [ ] Pregnancy                                                            (1 Point)  [ ] Varicose veins                                               (1 Point)                  [ ] Post-partum < 6 weeks                                      (1 Point)             [ x] BMI > 25 Kg/m2                                            (1 Point)                  [ ] Hormonal therapy or oral contraception       (1 Point)                 [ ] Sepsis (in the previous month)                        (1 Point)             [ ] History of pregnancy complications                (1Point)  [ ] Pneumonia or serious lung disease                                             [ ] Unexplained or recurrent  (=/>3), premature                                 (In the previous month)                               (1 Point)                birth with toxemia or growth-restricted infant (1 Point)  [ ] Abnormal pulmonary function test            (1 Point)                                   SURGERY RELATED RISK FACTORS  [ ] Acute myocardial infarction                       (1 Point)                  [ ]  Section                                         (1 Point)  [ ] Congestive heart failure (in the previous month) (1 Point)   [ ] Minor surgery   lasting <45 minutes       (1 Point)   [ ] Inflammatory bowel disease                             (1 Point)          [ ] Arthroscopic surgery                                  (2 Points)  [ ] Central venous access                                    (2 Points)            [ ] General surgery lasting >45 minutes      (2 Points)       [ ] Stroke (in the previous month)                  (5 Points)            [x ] Elective major lower extremity arthroplasty (5 Points)                                   [  ] Malignancy (present or past include skin melanoma                                          but exclude  basal skin cell)    (2 points)                                      TRAUMA RELATED RISK FACTORS                HEMATOLOGY RELATED FACTORS                                  [ ] Fracture of the hip, pelvis, or leg                       (5 Points)  [ x] Prior episodes of VTE                                     (3 Points)          [ ] Acute spinal cord injury (in the previous month)  (5 Points)  [ x] Positive family history for VTE                         (3 Points)       [ ] Paralysis (less than 1 month)                          (5 Points)  [ ] Prothrombin 19207 A                                      (3 Points)         [ ] Multiple Trauma (within 1month)                 (5Points)                                                                                                                                                                [ ] Factor V Leiden                                          (3 Points)                                OTHER RISK FACTORS                          [ ] Lupus anticoagulants                                     (3 Points)                     [ ] BMI > 40                          (1 Point)                                                         [ ] Anticardiolipin antibodies                                (3 Points)                 [ ] Smoking                              (1Point)                                                [ ] High homocysteine in the blood                      (3 Points)                [  ] Diabetes requiring insulin (1point)                         [ ] Other congenital or acquired thrombophilia       (3 Points)          [  ] Chemotherapy                   (1 Point)  [ ] Heparin induced thrombocytopenia                  (3 Points)             [  ] Blood Transfusion                (1 point)                                                                                                             Total Score [      14**    ]                                                                                                                                                                                                                                                                                                                                                                                                                                       IMPROVE Bleeding Risk Score:2.5      Falls Risk:   High (x  )  Mod (  )  Low (  )      FAMILY HISTORY:  FH: pancreatic cancer (Father)  family history of DVT - mother and sister- both on coumadin    Allergies    No Known Allergies    Intolerances        REVIEW OF SYSTEMS    (  )Fever	        (  )Constipation	(  )SOB				  (  )Headache   (  )Dysuria  (  )Chills	        (  )Melena	        (  )Dyspnea on exertion (  )Dizziness    (  )Polyuria  (  )Nausea      (  )Hematochezia	(  )Cough                          (  )Syncope      (  )Hematuria  (  )Vomiting   (  )Chest Pain	        (  )Wheezing			  (  )Weakness  (  )Diarrhea    (  )Palpitations	(  )Anorexia			  ( x )Myalgia       (  x ) Arthralgia    Pertinent positives in HPI and daily subjective. All other systems negative.    Vital Signs Last 24 Hrs  T(C): 36.2 (21 @ 11:33), Max: 36.8 (21 @ 06:19)  T(F): 97.2 (21 @ 11:33), Max: 98.2 (21 @ 06:19)  HR: 67 (21 @ 13:15) (59 - 75)  BP: 122/60 (21 @ 13:15) (109/62 - 161/64)  BP(mean): --  RR: 20 (21 @ 13:15) (13 - 20)  SpO2: 100% (21 @ 13:15) (90% - 100%)      PHYSICAL EXAM:    Constitutional: Appears Well    Neurological: A& O x 3    Skin: Warm    Respiratory and Thorax: normal effort; Breath sounds: normal; No rales/wheezing/rhonchi  	  Cardiovascular: S1, S2, regular, NMBR MP: RSR 59 bpm, no ectopy    Gastrointestinal: BS + x 4Q, nontender	    Musculoskeletal:   General Right:   no muscle/joint tenderness,   normal tone, no joint swelling,   ROM: full	    General Left:   no muscle/joint tenderness,   normal tone, no joint swelling,   ROM: limited    Hip:  Right: Dressing CDI            Left: Dressing CDI      Knee:  Left: Ace wrap CDI    Lower extrems:   Right: no calf tenderness              negative elvia's sign               + pedal pulses    Left:   no calf tenderness              negative elvia's sign               + pedal pulses                          13.3   10.03 )-----------( 184      ( 2021 11:47 )             39.2       07-    141  |  110<H>  |  12  ----------------------------<  186<H>  5.4<H>   |  26  |  0.74    Ca    8.2<L>      2021 11:47          MEDICATIONS  (STANDING):  lactated ringers. 1000 milliLiter(s) IV Continuous <Continuous>  rivaroxaban 20 milliGRAM(s) Oral with dinner            **Current DVT Prophylaxis:    LMWH                   (  )  Heparin SQ           (  )  Coumadin             (  )  Xarelto                  ( x )  Eliquis                   (  )  Venodynes           (x  )  Ambulation          ( x )  UFH                       (  )  ECASA                   (  )  Contraindicated  (  )

## 2021-07-09 NOTE — DISCHARGE NOTE PROVIDER - NSDCMRMEDTOKEN_GEN_ALL_CORE_FT
Aspirin Enteric Coated 81 mg oral delayed release tablet: 1 tab(s) orally once a day  enalapril 5 mg oral tablet: 1 tab(s) orally once a day  ezetimibe-simvastatin 10 mg-40 mg oral tablet: 1 tab(s) orally once a day  gabapentin 300 mg oral tablet: 2  orally , As Needed  latanoprost 0.005% ophthalmic solution: 1 drop(s) to each affected eye once a day (at bedtime)  metFORMIN 500 mg oral tablet: 1 tab(s) orally 2 times a day  metoprolol succinate 25 mg oral tablet, extended release: 1 tab(s) orally once a day  MiraLax oral powder for reconstitution: 17 gram(s) orally once a day, As Needed   oxyCODONE 5 mg oral tablet: 1 tab(s) orally every 6 hours MDD:6 tablets  Protonix 40 mg oral delayed release tablet: 1 tab(s) orally once a day   rivaroxaban 20 mg oral tablet: 1 tab(s) orally once a day (in the evening)  senna oral tablet: 1 tab(s) orally once a day, As Needed -for constipation   SOLU-Medrol 1 g injection:   triamterene-hydrochlorothiazide 37.5 mg-25 mg oral tablet: 1 tab(s) orally once a day  Tylenol Extra Strength 500 mg oral tablet: 2 tab(s) orally 3 times a day   Vitamin B-12 100 mcg oral tablet: 1 tab(s) orally once a day  Vitamin D2 2000 intl units oral capsule: 1 cap(s) orally once a day   enalapril 5 mg oral tablet: 1 tab(s) orally once a day  ezetimibe-simvastatin 10 mg-40 mg oral tablet: 1 tab(s) orally once a day  gabapentin 300 mg oral tablet: 2  orally , As Needed  latanoprost 0.005% ophthalmic solution: 1 drop(s) to each affected eye once a day (at bedtime)  metFORMIN 500 mg oral tablet: 1 tab(s) orally 2 times a day  metoprolol succinate 25 mg oral tablet, extended release: 1 tab(s) orally once a day  MiraLax oral powder for reconstitution: 17 gram(s) orally once a day, As Needed   oxyCODONE 5 mg oral tablet: 1 tab(s) orally every 6 hours MDD:6 tablets  Protonix 40 mg oral delayed release tablet: 1 tab(s) orally once a day   rivaroxaban 20 mg oral tablet: 1 tab(s) orally once a day (in the evening)  senna oral tablet: 1 tab(s) orally once a day, As Needed -for constipation   SOLU-Medrol 1 g injection:   triamterene-hydrochlorothiazide 37.5 mg-25 mg oral tablet: 1 tab(s) orally once a day  Tylenol Extra Strength 500 mg oral tablet: 2 tab(s) orally 3 times a day   Vitamin B-12 100 mcg oral tablet: 1 tab(s) orally once a day  Vitamin D2 2000 intl units oral capsule: 1 cap(s) orally once a day

## 2021-07-09 NOTE — PHYSICAL THERAPY INITIAL EVALUATION ADULT - MODALITIES TREATMENT COMMENTS
Pt returned to [facility] in PACU, awaiting transfer to , reports L knee pain 1/10 during this session. RN Sherlyn present for session. Pt resting comfortable. VSS. No c/o dizziness.

## 2021-07-09 NOTE — PHYSICAL THERAPY INITIAL EVALUATION ADULT - GENERAL OBSERVATIONS, REHAB EVAL
Pt seen in PACU able to perform L SLR and reports 1/10 ache L knee and denies numbness. HR, BP and O2 stable.

## 2021-07-10 LAB
ANION GAP SERPL CALC-SCNC: 7 MMOL/L — SIGNIFICANT CHANGE UP (ref 5–17)
BUN SERPL-MCNC: 20 MG/DL — SIGNIFICANT CHANGE UP (ref 7–23)
CALCIUM SERPL-MCNC: 8.2 MG/DL — LOW (ref 8.5–10.1)
CHLORIDE SERPL-SCNC: 108 MMOL/L — SIGNIFICANT CHANGE UP (ref 96–108)
CO2 SERPL-SCNC: 24 MMOL/L — SIGNIFICANT CHANGE UP (ref 22–31)
COVID-19 SPIKE DOMAIN AB INTERP: POSITIVE
COVID-19 SPIKE DOMAIN ANTIBODY RESULT: >250 U/ML — HIGH
CREAT SERPL-MCNC: 0.81 MG/DL — SIGNIFICANT CHANGE UP (ref 0.5–1.3)
GLUCOSE SERPL-MCNC: 153 MG/DL — HIGH (ref 70–99)
HCT VFR BLD CALC: 28.2 % — LOW (ref 39–50)
HGB BLD-MCNC: 9.3 G/DL — LOW (ref 13–17)
MCHC RBC-ENTMCNC: 30.7 PG — SIGNIFICANT CHANGE UP (ref 27–34)
MCHC RBC-ENTMCNC: 33 GM/DL — SIGNIFICANT CHANGE UP (ref 32–36)
MCV RBC AUTO: 93.1 FL — SIGNIFICANT CHANGE UP (ref 80–100)
PLATELET # BLD AUTO: 169 K/UL — SIGNIFICANT CHANGE UP (ref 150–400)
POTASSIUM SERPL-MCNC: 4.4 MMOL/L — SIGNIFICANT CHANGE UP (ref 3.5–5.3)
POTASSIUM SERPL-SCNC: 4.4 MMOL/L — SIGNIFICANT CHANGE UP (ref 3.5–5.3)
RBC # BLD: 3.03 M/UL — LOW (ref 4.2–5.8)
RBC # FLD: 13 % — SIGNIFICANT CHANGE UP (ref 10.3–14.5)
SARS-COV-2 IGG+IGM SERPL QL IA: >250 U/ML — HIGH
SARS-COV-2 IGG+IGM SERPL QL IA: POSITIVE
SODIUM SERPL-SCNC: 139 MMOL/L — SIGNIFICANT CHANGE UP (ref 135–145)
WBC # BLD: 15.27 K/UL — HIGH (ref 3.8–10.5)
WBC # FLD AUTO: 15.27 K/UL — HIGH (ref 3.8–10.5)

## 2021-07-10 PROCEDURE — 99231 SBSQ HOSP IP/OBS SF/LOW 25: CPT

## 2021-07-10 PROCEDURE — 99232 SBSQ HOSP IP/OBS MODERATE 35: CPT

## 2021-07-10 RX ADMIN — Medication 1 TABLET(S): at 13:54

## 2021-07-10 RX ADMIN — Medication 1 TABLET(S): at 11:05

## 2021-07-10 RX ADMIN — Medication 100 MILLIGRAM(S): at 01:59

## 2021-07-10 RX ADMIN — CELECOXIB 200 MILLIGRAM(S): 200 CAPSULE ORAL at 11:58

## 2021-07-10 RX ADMIN — Medication 975 MILLIGRAM(S): at 13:54

## 2021-07-10 RX ADMIN — Medication 1 MILLIGRAM(S): at 11:06

## 2021-07-10 RX ADMIN — Medication 325 MILLIGRAM(S): at 11:05

## 2021-07-10 RX ADMIN — CELECOXIB 200 MILLIGRAM(S): 200 CAPSULE ORAL at 21:16

## 2021-07-10 RX ADMIN — Medication 500 MILLIGRAM(S): at 11:07

## 2021-07-10 RX ADMIN — Medication 975 MILLIGRAM(S): at 13:55

## 2021-07-10 RX ADMIN — PANTOPRAZOLE SODIUM 40 MILLIGRAM(S): 20 TABLET, DELAYED RELEASE ORAL at 06:36

## 2021-07-10 RX ADMIN — OXYCODONE HYDROCHLORIDE 10 MILLIGRAM(S): 5 TABLET ORAL at 22:40

## 2021-07-10 RX ADMIN — SIMVASTATIN 40 MILLIGRAM(S): 20 TABLET, FILM COATED ORAL at 21:16

## 2021-07-10 RX ADMIN — Medication 1 TABLET(S): at 06:36

## 2021-07-10 RX ADMIN — RIVAROXABAN 20 MILLIGRAM(S): KIT at 18:00

## 2021-07-10 RX ADMIN — Medication 1: at 07:59

## 2021-07-10 RX ADMIN — Medication 975 MILLIGRAM(S): at 06:36

## 2021-07-10 RX ADMIN — OXYCODONE HYDROCHLORIDE 10 MILLIGRAM(S): 5 TABLET ORAL at 23:10

## 2021-07-10 RX ADMIN — GABAPENTIN 600 MILLIGRAM(S): 400 CAPSULE ORAL at 11:06

## 2021-07-10 RX ADMIN — LATANOPROST 1 DROP(S): 0.05 SOLUTION/ DROPS OPHTHALMIC; TOPICAL at 21:13

## 2021-07-10 RX ADMIN — Medication 975 MILLIGRAM(S): at 21:16

## 2021-07-10 RX ADMIN — Medication 25 MILLIGRAM(S): at 11:06

## 2021-07-10 RX ADMIN — CELECOXIB 200 MILLIGRAM(S): 200 CAPSULE ORAL at 21:17

## 2021-07-10 RX ADMIN — Medication 975 MILLIGRAM(S): at 21:17

## 2021-07-10 RX ADMIN — CELECOXIB 200 MILLIGRAM(S): 200 CAPSULE ORAL at 11:05

## 2021-07-10 RX ADMIN — Medication 1: at 17:58

## 2021-07-10 RX ADMIN — Medication 5 MILLIGRAM(S): at 11:06

## 2021-07-10 NOTE — PROGRESS NOTE ADULT - SUBJECTIVE AND OBJECTIVE BOX
HPI:  Patient is a 66y old  Male who presents with a chief complaint of left knee pain s/p left total knee replacement 21.    Consulted by Dr. Nayak for VTE prophylaxis, risk stratification, and anticoagulation management.    PAST MEDICAL & SURGICAL HISTORY:  HLD (hyperlipidemia)  CAD (coronary artery disease)  MI, old  x2- [id number] (hypertension)    LEA (obstructive sleep apnea)  CPAP  Demyelinating neuropathy  DVT (deep venous thrombosis)  unprovoked DVT/PE- as per patient his mother and sister also had DVT- genetic testing was done but negative    Personal history of PE (pulmonary embolism)    DM (diabetes mellitus)    Osteoarthritis    History of cholecystectomy    H/O Achilles tendon repair    History of percutaneous coronary intervention  RCA stent     S/P IVC filter  2021    Carpal tunnel syndrome of right wrist      Interval History:  21: Patient seen at bedside in PACU, awake and alert and able to communicate effectively . reports ;pain as "1 out of 10." Discussed history of VTE. He has shortness of breath x one week about two years ago and upon ED eval with + pulmonary embolism and DVT's lower extremities. His mother and sister both have history of DVT's and Mom had IVC filter and sister also on oral AC. He denies any more events. He has not taken his Xarelto for 5 days now. Heme- Dr. Garcia follows. He had full genetic workup and all negative. He is on Xarelto 20 mg daily at home.   7/10/21:Patient seen at bedside on 2 north, Surgical dsg with moderate bloody drainage, dsg reinforced by RN , ortho aware, discussed the case patient has IVC filter will continue Xarelto now but if there is any more bleeding will hold Xarelto tonight, H&H 9.3/28.2    BMI: 39.5    CrCl:172.2    Incision Time:0812  Procedure End Time:1130  EBL:200ml    Caprini VTE Risk Score:  CAPRINI SCORE  AGE RELATED RISK FACTORS                                                       MOBILITY RELATED FACTORS  [ ] Age 41-60 years                                            (1 Point)                  [ ] Bed rest /restricted mobility                      (1 Point)  [ x] Age: 61-74 years                                           (2 Points)                [ ] Plaster cast                                                   (2 Points)  [ ] Age= 75 years                                              (3 Points)                 [ ] Bed bound for more than 72 hours                   (2 Points)    DISEASE RELATED RISK FACTORS                                               GENDER SPECIFIC FACTORS  [ ] Edema in the lower extremities                       (1 Point)           [ ] Pregnancy                                                            (1 Point)  [ ] Varicose veins                                               (1 Point)                  [ ] Post-partum < 6 weeks                                      (1 Point)             [ x] BMI > 25 Kg/m2                                            (1 Point)                  [ ] Hormonal therapy or oral contraception       (1 Point)                 [ ] Sepsis (in the previous month)                        (1 Point)             [ ] History of pregnancy complications                (1Point)  [ ] Pneumonia or serious lung disease                                             [ ] Unexplained or recurrent  (=/>3), premature                                 (In the previous month)                               (1 Point)                birth with toxemia or growth-restricted infant (1 Point)  [ ] Abnormal pulmonary function test            (1 Point)                                   SURGERY RELATED RISK FACTORS  [ ] Acute myocardial infarction                       (1 Point)                  [ ]  Section                                         (1 Point)  [ ] Congestive heart failure (in the previous month) (1 Point)   [ ] Minor surgery   lasting <45 minutes       (1 Point)   [ ] Inflammatory bowel disease                             (1 Point)          [ ] Arthroscopic surgery                                  (2 Points)  [ ] Central venous access                                    (2 Points)            [ ] General surgery lasting >45 minutes      (2 Points)       [ ] Stroke (in the previous month)                  (5 Points)            [x ] Elective major lower extremity arthroplasty (5 Points)                                   [  ] Malignancy (present or past include skin melanoma                                          but exclude  basal skin cell)    (2 points)                                      TRAUMA RELATED RISK FACTORS                HEMATOLOGY RELATED FACTORS                                  [ ] Fracture of the hip, pelvis, or leg                       (5 Points)  [ x] Prior episodes of VTE                                     (3 Points)          [ ] Acute spinal cord injury (in the previous month)  (5 Points)  [ x] Positive family history for VTE                         (3 Points)       [ ] Paralysis (less than 1 month)                          (5 Points)  [ ] Prothrombin 21829 A                                      (3 Points)         [ ] Multiple Trauma (within 1month)                 (5Points)                                                                                                                                                                [ ] Factor V Leiden                                          (3 Points)                                OTHER RISK FACTORS                          [ ] Lupus anticoagulants                                     (3 Points)                     [ ] BMI > 40                          (1 Point)                                                         [ ] Anticardiolipin antibodies                                (3 Points)                 [ ] Smoking                              (1Point)                                                [ ] High homocysteine in the blood                      (3 Points)                [  ] Diabetes requiring insulin (1point)                         [ ] Other congenital or acquired thrombophilia       (3 Points)          [  ] Chemotherapy                   (1 Point)  [ ] Heparin induced thrombocytopenia                  (3 Points)             [  ] Blood Transfusion                (1 point)                                                                                                             Total Score [      14**    ]                                                                                                                                                                                                                                                                                                                                                                                                                                       IMPROVE Bleeding Risk Score:2.5      Falls Risk:   High (x  )  Mod (  )  Low (  )      FAMILY HISTORY:  FH: pancreatic cancer (Father)  family history of DVT - mother and sister- both on coumadin    Allergies    No Known Allergies    Intolerances        REVIEW OF SYSTEMS    (  )Fever	        (  )Constipation	(  )SOB				  (  )Headache   (  )Dysuria  (  )Chills	        (  )Melena	        (  )Dyspnea on exertion (  )Dizziness    (  )Polyuria  (  )Nausea      (  )Hematochezia	(  )Cough                          (  )Syncope      (  )Hematuria  (  )Vomiting   (  )Chest Pain	        (  )Wheezing			  (  )Weakness  (  )Diarrhea    (  )Palpitations	(  )Anorexia			  ( x )Myalgia       (  x ) Arthralgia    Pertinent positives in HPI and daily subjective. All other systems negative.    Vital Signs Last 24 Hrs  T(C): 37.2 (10 Jul 2021 10:22), Max: 37.2 (10 Jul 2021 10:22)  T(F): 98.9 (10 Jul 2021 10:22), Max: 98.9 (10 Jul 2021 10:22)  HR: 79 (10 Jul 2021 10:22) (60 - 84)  BP: 157/75 (10 Jul 2021 10:22) (106/58 - 157/75)  BP(mean): 90 (10 Jul 2021 10:22) (90 - 90)  RR: 16 (10 Jul 2021 10:22) (12 - 20)  SpO2: 97% (10 Jul 2021 10:22) (97% - 100%)    PHYSICAL EXAM:    Constitutional: Appears Well    Neurological: A& O x 3    Skin: Warm    Respiratory and Thorax: normal effort; Breath sounds: normal; No rales/wheezing/rhonchi  	  Cardiovascular: S1, S2, regular, NMBR MP: RSR 59 bpm, no ectopy    Gastrointestinal: BS + x 4Q, nontender	    Musculoskeletal:   General Right:   no muscle/joint tenderness,   normal tone, no joint swelling,   ROM: full	    General Left:   no muscle/joint tenderness,   normal tone, no joint swelling,   ROM: limited        Knee:  Left: Ace wrap, moderate, bloody drainage noted, dsg reinforced by RN    Lower extrems:   Right: no calf tenderness              negative elvia's sign               + pedal pulses    Left:   no calf tenderness              negative elvia's sign               + pedal pulses                                 9.3    15.27 )-----------( 169      ( 10 Jul 2021 08:46 )             28.2       07-10    139  |  108  |  20  ----------------------------<  153<H>  4.4   |  24  |  0.81    Ca    8.2<L>      10 Jul 2021 08:46                       13.3   10.03 )-----------( 184      ( 2021 11:47 )             39.2       07-09    141  |  110<H>  |  12  ----------------------------<  186<H>  5.4<H>   |  26  |  0.74    Ca    8.2<L>      2021 11:47    MEDICATIONS  (STANDING):  acetaminophen   Tablet .. 975 milliGRAM(s) Oral every 8 hours  ascorbic acid 500 milliGRAM(s) Oral two times a day  calcium carbonate 1250 mG  + Vitamin D (OsCal 500 + D) 1 Tablet(s) Oral three times a day  celecoxib 200 milliGRAM(s) Oral every 12 hours  dextrose 40% Gel 15 Gram(s) Oral once  dextrose 5%. 1000 milliLiter(s) (50 mL/Hr) IV Continuous <Continuous>  dextrose 5%. 1000 milliLiter(s) (100 mL/Hr) IV Continuous <Continuous>  dextrose 50% Injectable 25 Gram(s) IV Push once  dextrose 50% Injectable 12.5 Gram(s) IV Push once  dextrose 50% Injectable 25 Gram(s) IV Push once  enalapril 5 milliGRAM(s) Oral daily  ferrous    sulfate 325 milliGRAM(s) Oral daily  folic acid 1 milliGRAM(s) Oral daily  gabapentin 600 milliGRAM(s) Oral daily  glucagon  Injectable 1 milliGRAM(s) IntraMuscular once  insulin lispro (ADMELOG) corrective regimen sliding scale   SubCutaneous three times a day before meals  insulin lispro (ADMELOG) corrective regimen sliding scale   SubCutaneous at bedtime  lactated ringers. 1000 milliLiter(s) (75 mL/Hr) IV Continuous <Continuous>  latanoprost 0.005% Ophthalmic Solution 1 Drop(s) Both EYES at bedtime  metoprolol succinate ER 25 milliGRAM(s) Oral daily  multivitamin 1 Tablet(s) Oral daily  pantoprazole    Tablet 40 milliGRAM(s) Oral before breakfast  polyethylene glycol 3350 17 Gram(s) Oral at bedtime  rivaroxaban 20 milliGRAM(s) Oral with dinner  senna 2 Tablet(s) Oral at bedtime  simvastatin 40 milliGRAM(s) Oral at bedtime      **Current DVT Prophylaxis:    LMWH                   (  )  Heparin SQ           (  )  Coumadin             (  )  Xarelto                  ( x )  Eliquis                   (  )  Venodynes           (x  )  Ambulation          ( x )  UFH                       (  )  ECASA                   (  )  Contraindicated  (  )

## 2021-07-10 NOTE — PROGRESS NOTE ADULT - SUBJECTIVE AND OBJECTIVE BOX
PCP: Dr kimani mcelroy    CHIEF COMPLAINT:  inc left knee OA    HISTORY OF THE PRESENT ILLNESS: this is a 67 yo male with PMH of HTN, HLD, CAD with MI x 2 with stent x1, CIDP on monthly solumedrol infusions. last Infusion Friday 7/2, DMII, H/O DVT/PE on Xarelto ( has familial h/o VTE although heme hypercoag workup was negative), last Xarelto dose 7/5,  placement of IVC filter yesterday, LEA uses CPAP and OA of the left knee with progressive pain with ambulation, climbing stairs and INC pain at night. He failed the usual modalities for knee pain and is now s/p left TKR. Hospitalist service consulted for medical comangement.     7/10: pt seen and examined this am. Had some bleeding from left knee earlier today. Hgb 9.3. no dizziness/lightheadedness. Pain controlled. no sob/chest pain. tolerating po. no difficulty voiding.     REVIEW OF SYSTEMS:   All 10 systems reviewed in detailed and found to be negative with the exception of what has already been described above  Vital Signs Last 24 Hrs  T(C): 37.2 (10 Jul 2021 10:22), Max: 37.2 (10 Jul 2021 10:22)  T(F): 98.9 (10 Jul 2021 10:22), Max: 98.9 (10 Jul 2021 10:22)  HR: 79 (10 Jul 2021 10:22) (62 - 84)  BP: 157/75 (10 Jul 2021 10:22) (111/58 - 157/75)  BP(mean): 90 (10 Jul 2021 10:22) (90 - 90)  RR: 16 (10 Jul 2021 10:22) (15 - 18)  SpO2: 97% (10 Jul 2021 10:22) (97% - 100%)  PHYSICAL EXAM:    GENERAL: Comfortable, no acute distress   HEAD:  Normocephalic, atraumatic  EYES: EOMI, PERRLA  HEENT: Moist mucous membranes  NECK: Supple, No JVD  NERVOUS SYSTEM:  Alert & Oriented X3, Motor Strength 5/5 B/L upper and lower extremities  CHEST/LUNG: Clear to auscultation bilaterally  HEART: Regular rate and rhythm  ABDOMEN: Soft, non tender, Nondistended, Bowel sounds present  GENITOURINARY: Voiding, no palpable bladder  EXTREMITIES:   No clubbing, cyanosis, or edema  MUSCULOSKELETAL- left knee dsg c/d/i, + sensation, + dorsiflexion  SKIN-no rash        LABS:                        9.3    15.27 )-----------( 169      ( 10 Jul 2021 08:46 )             28.2     07-10    139  |  108  |  20  ----------------------------<  153<H>  4.4   |  24  |  0.81    Ca    8.2<L>      10 Jul 2021 08:46      MEDICATIONS  (STANDING):  acetaminophen   Tablet .. 975 milliGRAM(s) Oral every 8 hours  ascorbic acid 500 milliGRAM(s) Oral two times a day  calcium carbonate 1250 mG  + Vitamin D (OsCal 500 + D) 1 Tablet(s) Oral three times a day  celecoxib 200 milliGRAM(s) Oral every 12 hours  dextrose 40% Gel 15 Gram(s) Oral once  dextrose 5%. 1000 milliLiter(s) (50 mL/Hr) IV Continuous <Continuous>  dextrose 5%. 1000 milliLiter(s) (100 mL/Hr) IV Continuous <Continuous>  dextrose 50% Injectable 25 Gram(s) IV Push once  dextrose 50% Injectable 12.5 Gram(s) IV Push once  dextrose 50% Injectable 25 Gram(s) IV Push once  enalapril 5 milliGRAM(s) Oral daily  ferrous    sulfate 325 milliGRAM(s) Oral daily  folic acid 1 milliGRAM(s) Oral daily  gabapentin 600 milliGRAM(s) Oral daily  glucagon  Injectable 1 milliGRAM(s) IntraMuscular once  insulin lispro (ADMELOG) corrective regimen sliding scale   SubCutaneous three times a day before meals  insulin lispro (ADMELOG) corrective regimen sliding scale   SubCutaneous at bedtime  lactated ringers. 1000 milliLiter(s) (75 mL/Hr) IV Continuous <Continuous>  latanoprost 0.005% Ophthalmic Solution 1 Drop(s) Both EYES at bedtime  metoprolol succinate ER 25 milliGRAM(s) Oral daily  multivitamin 1 Tablet(s) Oral daily  pantoprazole    Tablet 40 milliGRAM(s) Oral before breakfast  polyethylene glycol 3350 17 Gram(s) Oral at bedtime  rivaroxaban 20 milliGRAM(s) Oral with dinner  senna 2 Tablet(s) Oral at bedtime  simvastatin 40 milliGRAM(s) Oral at bedtime    MEDICATIONS  (PRN):  HYDROmorphone  Injectable 0.5 milliGRAM(s) IV Push every 4 hours PRN Severe Pain (7 - 10)  ondansetron Injectable 8 milliGRAM(s) IV Push every 8 hours PRN Nausea and/or Vomiting  oxyCODONE    IR 5 milliGRAM(s) Oral every 4 hours PRN Mild Pain (1 - 3)  oxyCODONE    IR 10 milliGRAM(s) Oral every 4 hours PRN Moderate Pain (4 - 6)      IMPRESSION: 67 yo male with above pmh a/w:      #left knee OA  # S/P left TKR  POD#1  pain control  PT   incentive spirometry  bowel regimen.    #acute blood loss anemia:  -d/t bleeding at surgical site/surgery.   -monitor h/h    #CIDP   cont gabapentin      #CAD with stents    cont ASA    # HTN  cont BB/ACE-i  hold diuretic    # HLD  cont statin    # Hyperkalemia    K+ 5.4 post op    recheck K+ at 1600    #LEA   monitor O2 sats    supple O2 prn    #DMII  hold OHAS  BGM/SS    # Vte prophylaxis/H/O DVT/PE    recent IVC filter placement  AC consult: resume Xarelto  Venodynes  INC mobility           PCP: Dr kimani mcelroy    CHIEF COMPLAINT:  inc left knee OA    HISTORY OF THE PRESENT ILLNESS: this is a 67 yo male with PMH of HTN, HLD, CAD with MI x 2 with stent x1, CIDP on monthly solumedrol infusions. last Infusion Friday 7/2, DMII, H/O DVT/PE on Xarelto ( has familial h/o VTE although heme hypercoag workup was negative), last Xarelto dose 7/5,  placement of IVC filter yesterday, LEA uses CPAP and OA of the left knee with progressive pain with ambulation, climbing stairs and INC pain at night. He failed the usual modalities for knee pain and is now s/p left TKR. Hospitalist service consulted for medical comangement.     7/10: pt seen and examined this am. Had some bleeding from left knee earlier today. Hgb 9.3. no dizziness/lightheadedness. Pain controlled. no sob/chest pain. tolerating po. no difficulty voiding.     REVIEW OF SYSTEMS:   All 10 systems reviewed in detailed and found to be negative with the exception of what has already been described above  Vital Signs Last 24 Hrs  T(C): 37.2 (10 Jul 2021 10:22), Max: 37.2 (10 Jul 2021 10:22)  T(F): 98.9 (10 Jul 2021 10:22), Max: 98.9 (10 Jul 2021 10:22)  HR: 79 (10 Jul 2021 10:22) (62 - 84)  BP: 157/75 (10 Jul 2021 10:22) (111/58 - 157/75)  BP(mean): 90 (10 Jul 2021 10:22) (90 - 90)  RR: 16 (10 Jul 2021 10:22) (15 - 18)  SpO2: 97% (10 Jul 2021 10:22) (97% - 100%)  PHYSICAL EXAM:    GENERAL: Comfortable, no acute distress   HEAD:  Normocephalic, atraumatic  EYES: EOMI, PERRLA  HEENT: Moist mucous membranes  NECK: Supple, No JVD  NERVOUS SYSTEM:  Alert & Oriented X3, Motor Strength 5/5 B/L upper and lower extremities  CHEST/LUNG: Clear to auscultation bilaterally  HEART: Regular rate and rhythm  ABDOMEN: Soft, non tender, Nondistended, Bowel sounds present  GENITOURINARY: Voiding, no palpable bladder  EXTREMITIES:   No clubbing, cyanosis, or edema  MUSCULOSKELETAL- left knee dsg c/d/i, + sensation, + dorsiflexion  SKIN-no rash        LABS:                        9.3    15.27 )-----------( 169      ( 10 Jul 2021 08:46 )             28.2     07-10    139  |  108  |  20  ----------------------------<  153<H>  4.4   |  24  |  0.81    Ca    8.2<L>      10 Jul 2021 08:46      MEDICATIONS  (STANDING):  acetaminophen   Tablet .. 975 milliGRAM(s) Oral every 8 hours  ascorbic acid 500 milliGRAM(s) Oral two times a day  calcium carbonate 1250 mG  + Vitamin D (OsCal 500 + D) 1 Tablet(s) Oral three times a day  celecoxib 200 milliGRAM(s) Oral every 12 hours  dextrose 40% Gel 15 Gram(s) Oral once  dextrose 5%. 1000 milliLiter(s) (50 mL/Hr) IV Continuous <Continuous>  dextrose 5%. 1000 milliLiter(s) (100 mL/Hr) IV Continuous <Continuous>  dextrose 50% Injectable 25 Gram(s) IV Push once  dextrose 50% Injectable 12.5 Gram(s) IV Push once  dextrose 50% Injectable 25 Gram(s) IV Push once  enalapril 5 milliGRAM(s) Oral daily  ferrous    sulfate 325 milliGRAM(s) Oral daily  folic acid 1 milliGRAM(s) Oral daily  gabapentin 600 milliGRAM(s) Oral daily  glucagon  Injectable 1 milliGRAM(s) IntraMuscular once  insulin lispro (ADMELOG) corrective regimen sliding scale   SubCutaneous three times a day before meals  insulin lispro (ADMELOG) corrective regimen sliding scale   SubCutaneous at bedtime  lactated ringers. 1000 milliLiter(s) (75 mL/Hr) IV Continuous <Continuous>  latanoprost 0.005% Ophthalmic Solution 1 Drop(s) Both EYES at bedtime  metoprolol succinate ER 25 milliGRAM(s) Oral daily  multivitamin 1 Tablet(s) Oral daily  pantoprazole    Tablet 40 milliGRAM(s) Oral before breakfast  polyethylene glycol 3350 17 Gram(s) Oral at bedtime  rivaroxaban 20 milliGRAM(s) Oral with dinner  senna 2 Tablet(s) Oral at bedtime  simvastatin 40 milliGRAM(s) Oral at bedtime    MEDICATIONS  (PRN):  HYDROmorphone  Injectable 0.5 milliGRAM(s) IV Push every 4 hours PRN Severe Pain (7 - 10)  ondansetron Injectable 8 milliGRAM(s) IV Push every 8 hours PRN Nausea and/or Vomiting  oxyCODONE    IR 5 milliGRAM(s) Oral every 4 hours PRN Mild Pain (1 - 3)  oxyCODONE    IR 10 milliGRAM(s) Oral every 4 hours PRN Moderate Pain (4 - 6)      IMPRESSION: 67 yo male with above pmh a/w:      #left knee OA  # S/P left TKR  POD#1  pain control  PT   incentive spirometry  bowel regimen.    #acute blood loss anemia:  -d/t bleeding at surgical site/surgery.   -monitor h/h    #CIDP   cont gabapentin      #CAD with stents    cont ASA    # HTN  cont BB/ACE-i  hold diuretic    # HLD  cont statin    # Hyperkalemia  -resolved    #LEA   monitor O2 sats    supple O2 prn    #DMII  hold OHAS  BGM/SS    # Vte prophylaxis/H/O DVT/PE    recent IVC filter placement  AC consult: resume Xarelto  Venodynes  INC mobility

## 2021-07-10 NOTE — PROGRESS NOTE ADULT - SUBJECTIVE AND OBJECTIVE BOX
Orthopedics     This is a 67y/o Male s/p Left Total Knee Arthroplasty POD 1.  Pain is controlled. Pt feeling well. No nausea or vomiting.     Vital Signs Last 24 Hrs  T(C): 36.7 (10 Jul 2021 05:22), Max: 36.9 (09 Jul 2021 20:08)  T(F): 98 (10 Jul 2021 05:22), Max: 98.4 (09 Jul 2021 20:08)  HR: 84 (10 Jul 2021 05:22) (60 - 84)  BP: 146/85 (10 Jul 2021 05:22) (106/58 - 161/64)  BP(mean): --  RR: 17 (10 Jul 2021 05:22) (12 - 20)  SpO2: 98% (10 Jul 2021 05:22) (90% - 100%)    Exam:  NAD AAOx3.  Dressing clean, dry and intact.  SCDs pumps in place.  Calves are soft and nontender.  Moving all toes and ankle, +EHL/FHL/TA/GS.  SILT throughout.  DP and PT pulses 2+.    A/P:  Stable POD 1 Left Total Knee Arthroplasty  -Analgesia  -LLE elevation  -Ice application  -Prophylactic antibiotics  -DVT PE prophylaxis  -Incentive spirometry  -OOB PT WBAT LLE  -dispo planning to home after clears PT

## 2021-07-11 VITALS
TEMPERATURE: 98 F | RESPIRATION RATE: 16 BRPM | DIASTOLIC BLOOD PRESSURE: 60 MMHG | SYSTOLIC BLOOD PRESSURE: 129 MMHG | OXYGEN SATURATION: 100 % | HEART RATE: 65 BPM

## 2021-07-11 LAB
ANION GAP SERPL CALC-SCNC: 6 MMOL/L — SIGNIFICANT CHANGE UP (ref 5–17)
BASOPHILS # BLD AUTO: 0.02 K/UL — SIGNIFICANT CHANGE UP (ref 0–0.2)
BASOPHILS NFR BLD AUTO: 0.3 % — SIGNIFICANT CHANGE UP (ref 0–2)
BUN SERPL-MCNC: 21 MG/DL — SIGNIFICANT CHANGE UP (ref 7–23)
CALCIUM SERPL-MCNC: 8.1 MG/DL — LOW (ref 8.5–10.1)
CHLORIDE SERPL-SCNC: 108 MMOL/L — SIGNIFICANT CHANGE UP (ref 96–108)
CO2 SERPL-SCNC: 26 MMOL/L — SIGNIFICANT CHANGE UP (ref 22–31)
CREAT SERPL-MCNC: 0.71 MG/DL — SIGNIFICANT CHANGE UP (ref 0.5–1.3)
EOSINOPHIL # BLD AUTO: 0.11 K/UL — SIGNIFICANT CHANGE UP (ref 0–0.5)
EOSINOPHIL NFR BLD AUTO: 1.4 % — SIGNIFICANT CHANGE UP (ref 0–6)
GLUCOSE SERPL-MCNC: 143 MG/DL — HIGH (ref 70–99)
HCT VFR BLD CALC: 26.3 % — LOW (ref 39–50)
HGB BLD-MCNC: 8.7 G/DL — LOW (ref 13–17)
IMM GRANULOCYTES NFR BLD AUTO: 0.5 % — SIGNIFICANT CHANGE UP (ref 0–1.5)
LYMPHOCYTES # BLD AUTO: 2 K/UL — SIGNIFICANT CHANGE UP (ref 1–3.3)
LYMPHOCYTES # BLD AUTO: 26 % — SIGNIFICANT CHANGE UP (ref 13–44)
MAGNESIUM SERPL-MCNC: 2.1 MG/DL — SIGNIFICANT CHANGE UP (ref 1.6–2.6)
MCHC RBC-ENTMCNC: 31 PG — SIGNIFICANT CHANGE UP (ref 27–34)
MCHC RBC-ENTMCNC: 33.1 GM/DL — SIGNIFICANT CHANGE UP (ref 32–36)
MCV RBC AUTO: 93.6 FL — SIGNIFICANT CHANGE UP (ref 80–100)
MONOCYTES # BLD AUTO: 0.84 K/UL — SIGNIFICANT CHANGE UP (ref 0–0.9)
MONOCYTES NFR BLD AUTO: 10.9 % — SIGNIFICANT CHANGE UP (ref 2–14)
NEUTROPHILS # BLD AUTO: 4.67 K/UL — SIGNIFICANT CHANGE UP (ref 1.8–7.4)
NEUTROPHILS NFR BLD AUTO: 60.9 % — SIGNIFICANT CHANGE UP (ref 43–77)
PHOSPHATE SERPL-MCNC: 2.8 MG/DL — SIGNIFICANT CHANGE UP (ref 2.5–4.5)
PLATELET # BLD AUTO: 143 K/UL — LOW (ref 150–400)
POTASSIUM SERPL-MCNC: 3.6 MMOL/L — SIGNIFICANT CHANGE UP (ref 3.5–5.3)
POTASSIUM SERPL-SCNC: 3.6 MMOL/L — SIGNIFICANT CHANGE UP (ref 3.5–5.3)
RBC # BLD: 2.81 M/UL — LOW (ref 4.2–5.8)
RBC # FLD: 13.1 % — SIGNIFICANT CHANGE UP (ref 10.3–14.5)
SODIUM SERPL-SCNC: 140 MMOL/L — SIGNIFICANT CHANGE UP (ref 135–145)
WBC # BLD: 7.68 K/UL — SIGNIFICANT CHANGE UP (ref 3.8–10.5)
WBC # FLD AUTO: 7.68 K/UL — SIGNIFICANT CHANGE UP (ref 3.8–10.5)

## 2021-07-11 PROCEDURE — 99232 SBSQ HOSP IP/OBS MODERATE 35: CPT

## 2021-07-11 PROCEDURE — 99231 SBSQ HOSP IP/OBS SF/LOW 25: CPT

## 2021-07-11 RX ADMIN — Medication 5 MILLIGRAM(S): at 09:51

## 2021-07-11 RX ADMIN — CELECOXIB 200 MILLIGRAM(S): 200 CAPSULE ORAL at 09:52

## 2021-07-11 RX ADMIN — Medication 1 TABLET(S): at 05:34

## 2021-07-11 RX ADMIN — Medication 975 MILLIGRAM(S): at 05:33

## 2021-07-11 RX ADMIN — Medication 325 MILLIGRAM(S): at 09:51

## 2021-07-11 RX ADMIN — CELECOXIB 200 MILLIGRAM(S): 200 CAPSULE ORAL at 09:51

## 2021-07-11 RX ADMIN — Medication 25 MILLIGRAM(S): at 09:51

## 2021-07-11 RX ADMIN — Medication 975 MILLIGRAM(S): at 05:36

## 2021-07-11 RX ADMIN — OXYCODONE HYDROCHLORIDE 10 MILLIGRAM(S): 5 TABLET ORAL at 08:43

## 2021-07-11 RX ADMIN — PANTOPRAZOLE SODIUM 40 MILLIGRAM(S): 20 TABLET, DELAYED RELEASE ORAL at 05:34

## 2021-07-11 RX ADMIN — Medication 1 MILLIGRAM(S): at 09:50

## 2021-07-11 RX ADMIN — GABAPENTIN 600 MILLIGRAM(S): 400 CAPSULE ORAL at 09:50

## 2021-07-11 RX ADMIN — Medication 500 MILLIGRAM(S): at 09:50

## 2021-07-11 RX ADMIN — Medication 1 TABLET(S): at 09:50

## 2021-07-11 NOTE — PROGRESS NOTE ADULT - SUBJECTIVE AND OBJECTIVE BOX
Orthopedics     This is a 65y/o Male s/p Left Total Knee Arthroplasty POD 2.  Pain is controlled. Pt feeling well. No nausea or vomiting.     Vital Signs Last 24 Hrs  T(C): 36.8 (11 Jul 2021 00:00), Max: 37.2 (10 Jul 2021 10:22)  T(F): 98.2 (11 Jul 2021 00:00), Max: 98.9 (10 Jul 2021 10:22)  HR: 62 (11 Jul 2021 00:00) (62 - 101)  BP: 103/52 (11 Jul 2021 00:00) (101/51 - 157/75)  BP(mean): 90 (10 Jul 2021 10:22) (90 - 90)  RR: 16 (11 Jul 2021 00:00) (16 - 16)  SpO2: 96% (11 Jul 2021 00:00) (96% - 97%)    Exam:  NAD AAOx3.  Dressing clean, dry and intact.  SCDs pumps in place.  Calves are soft and nontender.  Moving all toes and ankle, +EHL/FHL/TA/GS.  SILT throughout.  DP and PT pulses 2+.    A/P:  Stable POD 2 Left Total Knee Arthroplasty  -Analgesia  -LLE elevation  -Ice application  -Prophylactic antibiotics  -DVT PE prophylaxis  -Incentive spirometry  -OOB PT WBAT LLE  -cleared for DC to home today

## 2021-07-11 NOTE — PROGRESS NOTE ADULT - SUBJECTIVE AND OBJECTIVE BOX
PCP: Dr kimani mcelroy    CHIEF COMPLAINT:  inc left knee OA    HISTORY OF THE PRESENT ILLNESS: this is a 65 yo male with PMH of HTN, HLD, CAD with MI x 2 with stent x1, CIDP on monthly solumedrol infusions. last Infusion Friday 7/2, DMII, H/O DVT/PE on Xarelto ( has familial h/o VTE although heme hypercoag workup was negative), last Xarelto dose 7/5,  placement of IVC filter yesterday, LEA uses CPAP and OA of the left knee with progressive pain with ambulation, climbing stairs and INC pain at night. He failed the usual modalities for knee pain and is now s/p left TKR. Hospitalist service consulted for medical comangement.     7/11: pt seen and examined this am. Feeling better. no further bleeding from left knee. tolerating po. no difficulty voiding.     REVIEW OF SYSTEMS:   All 10 systems reviewed in detailed and found to be negative with the exception of what has already been described above    Vital Signs Last 24 Hrs  T(C): 36.8 (11 Jul 2021 09:27), Max: 36.8 (11 Jul 2021 00:00)  T(F): 98.2 (11 Jul 2021 09:27), Max: 98.2 (11 Jul 2021 00:00)  HR: 65 (11 Jul 2021 09:27) (62 - 101)  BP: 129/60 (11 Jul 2021 09:27) (101/51 - 129/60)  BP(mean): 75 (11 Jul 2021 09:27) (75 - 75)  RR: 16 (11 Jul 2021 09:27) (16 - 16)  SpO2: 100% (11 Jul 2021 09:27) (96% - 100%)    PHYSICAL EXAM:    GENERAL: Comfortable, no acute distress   HEAD:  Normocephalic, atraumatic  EYES: EOMI, PERRLA  HEENT: Moist mucous membranes  NECK: Supple, No JVD  NERVOUS SYSTEM:  Alert & Oriented X3, Motor Strength 5/5 B/L upper and lower extremities  CHEST/LUNG: Clear to auscultation bilaterally  HEART: Regular rate and rhythm  ABDOMEN: Soft, non tender, Nondistended, Bowel sounds present  GENITOURINARY: Voiding, no palpable bladder  EXTREMITIES:   No clubbing, cyanosis, or edema  MUSCULOSKELETAL- left knee dsg c/d/i, + sensation, + dorsiflexion  SKIN-no rash  LABS:                        8.7    7.68  )-----------( 143      ( 11 Jul 2021 09:09 )             26.3     07-11    140  |  108  |  21  ----------------------------<  143<H>  3.6   |  26  |  0.71    Ca    8.1<L>      11 Jul 2021 09:09  Phos  2.8     07-11  Mg     2.1     07-11        MEDICATIONS  (STANDING):  acetaminophen   Tablet .. 975 milliGRAM(s) Oral every 8 hours  ascorbic acid 500 milliGRAM(s) Oral two times a day  calcium carbonate 1250 mG  + Vitamin D (OsCal 500 + D) 1 Tablet(s) Oral three times a day  celecoxib 200 milliGRAM(s) Oral every 12 hours  dextrose 40% Gel 15 Gram(s) Oral once  dextrose 5%. 1000 milliLiter(s) (50 mL/Hr) IV Continuous <Continuous>  dextrose 5%. 1000 milliLiter(s) (100 mL/Hr) IV Continuous <Continuous>  dextrose 50% Injectable 25 Gram(s) IV Push once  dextrose 50% Injectable 12.5 Gram(s) IV Push once  dextrose 50% Injectable 25 Gram(s) IV Push once  enalapril 5 milliGRAM(s) Oral daily  ferrous    sulfate 325 milliGRAM(s) Oral daily  folic acid 1 milliGRAM(s) Oral daily  gabapentin 600 milliGRAM(s) Oral daily  glucagon  Injectable 1 milliGRAM(s) IntraMuscular once  insulin lispro (ADMELOG) corrective regimen sliding scale   SubCutaneous three times a day before meals  insulin lispro (ADMELOG) corrective regimen sliding scale   SubCutaneous at bedtime  lactated ringers. 1000 milliLiter(s) (75 mL/Hr) IV Continuous <Continuous>  latanoprost 0.005% Ophthalmic Solution 1 Drop(s) Both EYES at bedtime  metoprolol succinate ER 25 milliGRAM(s) Oral daily  multivitamin 1 Tablet(s) Oral daily  pantoprazole    Tablet 40 milliGRAM(s) Oral before breakfast  polyethylene glycol 3350 17 Gram(s) Oral at bedtime  rivaroxaban 20 milliGRAM(s) Oral with dinner  senna 2 Tablet(s) Oral at bedtime  simvastatin 40 milliGRAM(s) Oral at bedtime    MEDICATIONS  (PRN):  bisacodyl Suppository 10 milliGRAM(s) Rectal once PRN Constipation  HYDROmorphone  Injectable 0.5 milliGRAM(s) IV Push every 4 hours PRN Severe Pain (7 - 10)  ondansetron Injectable 8 milliGRAM(s) IV Push every 8 hours PRN Nausea and/or Vomiting  oxyCODONE    IR 5 milliGRAM(s) Oral every 4 hours PRN Mild Pain (1 - 3)  oxyCODONE    IR 10 milliGRAM(s) Oral every 4 hours PRN Moderate Pain (4 - 6)          IMPRESSION: 65 yo male with above pmh a/w:      #left knee OA  # S/P left TKR  POD#2  pain control  PT   incentive spirometry  bowel regimen.    #acute blood loss anemia:  -d/t bleeding at surgical site/surgery.   -no need for transfusion at this time.    #CIDP   cont gabapentin      #CAD with stents    cont ASA    # HTN  cont BB/ACE-i  resume diuretic on dc.    # HLD  cont statin    # Hyperkalemia  -resolved    #LEA   monitor O2 sats    supple O2 prn    #DMII  hold OHAS  BGM/SS    # Vte prophylaxis/H/O DVT/PE  -xarelto

## 2021-07-11 NOTE — PROGRESS NOTE ADULT - ASSESSMENT
This is a 66 year old male s/p left total knee replacment 7/9 with significant medical history of DVT/PE about two years ago with + family history VTE as well on Xarelto 20 mg PO daily. Heme workup proved to be negative, and VTE unprovoked. This make him high risk for VTE especially in the post-operative setting with high BMI and impaired mobility.  He is low risk for bleeding.    7/10/2021: Patient's surgical site dsg with moderate bloody drainage dsg reinforced , case d/w ortho if there is any more bleeding will hold Xarelto tonight, patient has IVC placed preop    Plan:  ::patient H&H dropped to 8.7/26.3, patient has IVC filter  ::Will hold Xarelto 20mg tonight and will f/u with patient tomorrow( /Jose is his haematology)  ::Monitor for bleeeding  ::Daily CBC/BMP  ::Venodynes b/l  ::Enc ambulation    ::will continue to follow.  Dispo: Home Today 
This is a 66 year old male s/p left total knee replacment 7/9 with significant medical history of DVT/PE about two years ago with + family history VTE as well on Xarelto 20 mg PO daily. Heme workup proved to be negative, and VTE unprovoked. This make him high risk for VTE especially in the post-operative setting with high BMI and impaired mobility.  He is low risk for bleeding.    7/10/2021: Patient's surgical site dsg with moderate bloody drainage dsg reinforced , case d/w ortho if there is any more bleeding will hold Xarelto tonight, patient has IVC placed preop    Plan:  ::Continue Xarelto 20mg PO daily with food  ::Monitor for bleeeding  ::Daily CBC/BMP  ::Venodynes b/l  ::Enc ambulation    ::will continue to follow.  Dispo: Home

## 2021-07-11 NOTE — PROGRESS NOTE ADULT - SUBJECTIVE AND OBJECTIVE BOX
HPI:  Patient is a 66y old  Male who presents with a chief complaint of left knee pain s/p left total knee replacement 21.    Consulted by Dr. Nayak for VTE prophylaxis, risk stratification, and anticoagulation management.    PAST MEDICAL & SURGICAL HISTORY:  HLD (hyperlipidemia)  CAD (coronary artery disease)  MI, old  x2- [id number] (hypertension)    LEA (obstructive sleep apnea)  CPAP  Demyelinating neuropathy  DVT (deep venous thrombosis)  unprovoked DVT/PE- as per patient his mother and sister also had DVT- genetic testing was done but negative    Personal history of PE (pulmonary embolism)    DM (diabetes mellitus)    Osteoarthritis    History of cholecystectomy    H/O Achilles tendon repair    History of percutaneous coronary intervention  RCA stent     S/P IVC filter  2021    Carpal tunnel syndrome of right wrist      Interval History:  21: Patient seen at bedside in PACU, awake and alert and able to communicate effectively . reports ;pain as "1 out of 10." Discussed history of VTE. He has shortness of breath x one week about two years ago and upon ED eval with + pulmonary embolism and DVT's lower extremities. His mother and sister both have history of DVT's and Mom had IVC filter and sister also on oral AC. He denies any more events. He has not taken his Xarelto for 5 days now. Heme- Dr. Garcia follows. He had full genetic workup and all negative. He is on Xarelto 20 mg daily at home.   7/10/21:Patient seen at bedside on 2 north, Surgical dsg with moderate bloody drainage, dsg reinforced by RN , ortho aware, discussed the case patient has IVC filter will continue Xarelto now but if there is any more bleeding will hold Xarelto tonight, H&H 9.3/28.2  21:Patient seen at bedside, surgical site dsg with dime size old black drainage , no active bleeding, but H&H dropped will hold Xarelto tonight     BMI: 39.5    CrCl:172.2    Incision Time:0812  Procedure End Time:1130  EBL:200ml    Caprini VTE Risk Score:  CAPRINI SCORE  AGE RELATED RISK FACTORS                                                       MOBILITY RELATED FACTORS  [ ] Age 41-60 years                                            (1 Point)                  [ ] Bed rest /restricted mobility                      (1 Point)  [ x] Age: 61-74 years                                           (2 Points)                [ ] Plaster cast                                                   (2 Points)  [ ] Age= 75 years                                              (3 Points)                 [ ] Bed bound for more than 72 hours                   (2 Points)    DISEASE RELATED RISK FACTORS                                               GENDER SPECIFIC FACTORS  [ ] Edema in the lower extremities                       (1 Point)           [ ] Pregnancy                                                            (1 Point)  [ ] Varicose veins                                               (1 Point)                  [ ] Post-partum < 6 weeks                                      (1 Point)             [ x] BMI > 25 Kg/m2                                            (1 Point)                  [ ] Hormonal therapy or oral contraception       (1 Point)                 [ ] Sepsis (in the previous month)                        (1 Point)             [ ] History of pregnancy complications                (1Point)  [ ] Pneumonia or serious lung disease                                             [ ] Unexplained or recurrent  (=/>3), premature                                 (In the previous month)                               (1 Point)                birth with toxemia or growth-restricted infant (1 Point)  [ ] Abnormal pulmonary function test            (1 Point)                                   SURGERY RELATED RISK FACTORS  [ ] Acute myocardial infarction                       (1 Point)                  [ ]  Section                                         (1 Point)  [ ] Congestive heart failure (in the previous month) (1 Point)   [ ] Minor surgery   lasting <45 minutes       (1 Point)   [ ] Inflammatory bowel disease                             (1 Point)          [ ] Arthroscopic surgery                                  (2 Points)  [ ] Central venous access                                    (2 Points)            [ ] General surgery lasting >45 minutes      (2 Points)       [ ] Stroke (in the previous month)                  (5 Points)            [x ] Elective major lower extremity arthroplasty (5 Points)                                   [  ] Malignancy (present or past include skin melanoma                                          but exclude  basal skin cell)    (2 points)                                      TRAUMA RELATED RISK FACTORS                HEMATOLOGY RELATED FACTORS                                  [ ] Fracture of the hip, pelvis, or leg                       (5 Points)  [ x] Prior episodes of VTE                                     (3 Points)          [ ] Acute spinal cord injury (in the previous month)  (5 Points)  [ x] Positive family history for VTE                         (3 Points)       [ ] Paralysis (less than 1 month)                          (5 Points)  [ ] Prothrombin 06136 A                                      (3 Points)         [ ] Multiple Trauma (within 1month)                 (5Points)                                                                                                                                                                [ ] Factor V Leiden                                          (3 Points)                                OTHER RISK FACTORS                          [ ] Lupus anticoagulants                                     (3 Points)                     [ ] BMI > 40                          (1 Point)                                                         [ ] Anticardiolipin antibodies                                (3 Points)                 [ ] Smoking                              (1Point)                                                [ ] High homocysteine in the blood                      (3 Points)                [  ] Diabetes requiring insulin (1point)                         [ ] Other congenital or acquired thrombophilia       (3 Points)          [  ] Chemotherapy                   (1 Point)  [ ] Heparin induced thrombocytopenia                  (3 Points)             [  ] Blood Transfusion                (1 point)                                                                                                             Total Score [      14**    ]                                                                                                                                                                                                                                                                                                                                                                                                                                       IMPROVE Bleeding Risk Score:2.5      Falls Risk:   High (x  )  Mod (  )  Low (  )      FAMILY HISTORY:  FH: pancreatic cancer (Father)  family history of DVT - mother and sister- both on coumadin    Allergies    No Known Allergies    Intolerances        REVIEW OF SYSTEMS    (  )Fever	        (  )Constipation	(  )SOB				  (  )Headache   (  )Dysuria  (  )Chills	        (  )Melena	        (  )Dyspnea on exertion (  )Dizziness    (  )Polyuria  (  )Nausea      (  )Hematochezia	(  )Cough                          (  )Syncope      (  )Hematuria  (  )Vomiting   (  )Chest Pain	        (  )Wheezing			  (  )Weakness  (  )Diarrhea    (  )Palpitations	(  )Anorexia			  ( x )Myalgia       (  x ) Arthralgia    Pertinent positives in HPI and daily subjective. All other systems negative.    Vital Signs Last 24 Hrs  T(C): 36.8 (2021 09:27), Max: 36.8 (2021 00:00)  T(F): 98.2 (2021 09:27), Max: 98.2 (2021 00:00)  HR: 65 (2021 09:) (62 - 101)  BP: 129/60 (2021 09:27) (101/51 - 129/60)  BP(mean): 75 (:) (75 - 75)  RR: 16 (2021 09:27) (16 - 16)  SpO2: 100% (2021 09:) (96% - 100%)  PHYSICAL EXAM:    Constitutional: Appears Well    Neurological: A& O x 3    Skin: Warm    Respiratory and Thorax: normal effort; Breath sounds: normal; No rales/wheezing/rhonchi  	  Cardiovascular: S1, S2, regular, NMBR MP: RSR 59 bpm, no ectopy    Gastrointestinal: BS + x 4Q, nontender	    Musculoskeletal:   General Right:   no muscle/joint tenderness,   normal tone, no joint swelling,   ROM: full	    General Left:   no muscle/joint tenderness,   normal tone, no joint swelling,   ROM: limited        Knee:  Left: Ace wrap, moderate, bloody drainage noted, dsg reinforced by RN    Lower extrems:   Right: no calf tenderness              negative elvia's sign               + pedal pulses    Left:   no calf tenderness              negative elvia's sign               + pedal pulses                        8.7    7.68  )-----------( 143      ( 2021 09:09 )             26.3       07-11    140  |  108  |  21  ----------------------------<  143<H>  3.6   |  26  |  0.71    Ca    8.1<L>      2021 09:09  Phos  2.8     07-11  Mg     2.1     07-11                       9.3    15.27 )-----------( 169      ( 10 Jul 2021 08:46 )             28.2       07-10    139  |  108  |  20  ----------------------------<  153<H>  4.4   |  24  |  0.81    Ca    8.2<L>      10 Jul 2021 08:46                       13.3   10.03 )-----------( 184      ( 2021 11:47 )             39.2       07-09    141  |  110<H>  |  12  ----------------------------<  186<H>  5.4<H>   |  26  |  0.74    Ca    8.2<L>      2021 11:47    MEDICATIONS  (STANDING):  acetaminophen   Tablet .. 975 milliGRAM(s) Oral every 8 hours  ascorbic acid 500 milliGRAM(s) Oral two times a day  calcium carbonate 1250 mG  + Vitamin D (OsCal 500 + D) 1 Tablet(s) Oral three times a day  celecoxib 200 milliGRAM(s) Oral every 12 hours  dextrose 40% Gel 15 Gram(s) Oral once  dextrose 5%. 1000 milliLiter(s) (50 mL/Hr) IV Continuous <Continuous>  dextrose 5%. 1000 milliLiter(s) (100 mL/Hr) IV Continuous <Continuous>  dextrose 50% Injectable 25 Gram(s) IV Push once  dextrose 50% Injectable 12.5 Gram(s) IV Push once  dextrose 50% Injectable 25 Gram(s) IV Push once  enalapril 5 milliGRAM(s) Oral daily  ferrous    sulfate 325 milliGRAM(s) Oral daily  folic acid 1 milliGRAM(s) Oral daily  gabapentin 600 milliGRAM(s) Oral daily  glucagon  Injectable 1 milliGRAM(s) IntraMuscular once  insulin lispro (ADMELOG) corrective regimen sliding scale   SubCutaneous three times a day before meals  insulin lispro (ADMELOG) corrective regimen sliding scale   SubCutaneous at bedtime  lactated ringers. 1000 milliLiter(s) (75 mL/Hr) IV Continuous <Continuous>  latanoprost 0.005% Ophthalmic Solution 1 Drop(s) Both EYES at bedtime  metoprolol succinate ER 25 milliGRAM(s) Oral daily  multivitamin 1 Tablet(s) Oral daily  pantoprazole    Tablet 40 milliGRAM(s) Oral before breakfast  polyethylene glycol 3350 17 Gram(s) Oral at bedtime  rivaroxaban 20 milliGRAM(s) Oral with dinner  senna 2 Tablet(s) Oral at bedtime  simvastatin 40 milliGRAM(s) Oral at bedtime      **Current DVT Prophylaxis:    LMWH                   (  )  Heparin SQ           (  )  Coumadin             (  )  Xarelto                  ( x )  Eliquis                   (  )  Venodynes           (x  )  Ambulation          ( x )  UFH                       (  )  ECASA                   (  )  Contraindicated  (  )

## 2021-07-13 RX ORDER — FERROUS SULFATE 325(65) MG
1 TABLET ORAL
Qty: 42 | Refills: 0
Start: 2021-07-13 | End: 2021-07-26

## 2021-07-19 DIAGNOSIS — G47.33 OBSTRUCTIVE SLEEP APNEA (ADULT) (PEDIATRIC): ICD-10-CM

## 2021-07-19 DIAGNOSIS — G62.89 OTHER SPECIFIED POLYNEUROPATHIES: ICD-10-CM

## 2021-07-19 DIAGNOSIS — E11.40 TYPE 2 DIABETES MELLITUS WITH DIABETIC NEUROPATHY, UNSPECIFIED: ICD-10-CM

## 2021-07-19 DIAGNOSIS — Z86.718 PERSONAL HISTORY OF OTHER VENOUS THROMBOSIS AND EMBOLISM: ICD-10-CM

## 2021-07-19 DIAGNOSIS — M17.12 UNILATERAL PRIMARY OSTEOARTHRITIS, LEFT KNEE: ICD-10-CM

## 2021-07-19 DIAGNOSIS — I10 ESSENTIAL (PRIMARY) HYPERTENSION: ICD-10-CM

## 2021-07-19 DIAGNOSIS — I25.10 ATHEROSCLEROTIC HEART DISEASE OF NATIVE CORONARY ARTERY WITHOUT ANGINA PECTORIS: ICD-10-CM

## 2021-07-19 DIAGNOSIS — E78.5 HYPERLIPIDEMIA, UNSPECIFIED: ICD-10-CM

## 2021-09-10 ENCOUNTER — FORM ENCOUNTER (OUTPATIENT)
Age: 66
End: 2021-09-10

## 2021-09-11 ENCOUNTER — TRANSCRIPTION ENCOUNTER (OUTPATIENT)
Age: 66
End: 2021-09-11

## 2021-09-12 PROBLEM — M19.90 UNSPECIFIED OSTEOARTHRITIS, UNSPECIFIED SITE: Chronic | Status: ACTIVE | Noted: 2021-06-30

## 2021-09-12 PROBLEM — Z86.711 PERSONAL HISTORY OF PULMONARY EMBOLISM: Chronic | Status: ACTIVE | Noted: 2021-06-24

## 2021-09-12 PROBLEM — E11.9 TYPE 2 DIABETES MELLITUS WITHOUT COMPLICATIONS: Chronic | Status: ACTIVE | Noted: 2021-06-24

## 2021-09-12 PROBLEM — I25.2 OLD MYOCARDIAL INFARCTION: Chronic | Status: ACTIVE | Noted: 2017-08-29

## 2021-09-12 PROBLEM — G47.33 OBSTRUCTIVE SLEEP APNEA (ADULT) (PEDIATRIC): Chronic | Status: ACTIVE | Noted: 2017-08-29

## 2021-09-12 PROBLEM — I82.409 ACUTE EMBOLISM AND THROMBOSIS OF UNSPECIFIED DEEP VEINS OF UNSPECIFIED LOWER EXTREMITY: Chronic | Status: ACTIVE | Noted: 2021-06-24

## 2021-09-13 ENCOUNTER — APPOINTMENT (OUTPATIENT)
Dept: DISASTER EMERGENCY | Facility: HOSPITAL | Age: 66
End: 2021-09-13

## 2021-09-13 ENCOUNTER — OUTPATIENT (OUTPATIENT)
Dept: OUTPATIENT SERVICES | Facility: HOSPITAL | Age: 66
LOS: 1 days | End: 2021-09-13
Payer: MEDICARE

## 2021-09-13 VITALS
SYSTOLIC BLOOD PRESSURE: 148 MMHG | HEART RATE: 69 BPM | RESPIRATION RATE: 18 BRPM | TEMPERATURE: 98 F | OXYGEN SATURATION: 98 % | DIASTOLIC BLOOD PRESSURE: 84 MMHG

## 2021-09-13 VITALS
HEIGHT: 70 IN | SYSTOLIC BLOOD PRESSURE: 156 MMHG | RESPIRATION RATE: 18 BRPM | WEIGHT: 270.07 LBS | DIASTOLIC BLOOD PRESSURE: 96 MMHG | OXYGEN SATURATION: 99 % | HEART RATE: 68 BPM | TEMPERATURE: 98 F

## 2021-09-13 DIAGNOSIS — G56.01 CARPAL TUNNEL SYNDROME, RIGHT UPPER LIMB: Chronic | ICD-10-CM

## 2021-09-13 DIAGNOSIS — Z95.828 PRESENCE OF OTHER VASCULAR IMPLANTS AND GRAFTS: Chronic | ICD-10-CM

## 2021-09-13 DIAGNOSIS — Z90.49 ACQUIRED ABSENCE OF OTHER SPECIFIED PARTS OF DIGESTIVE TRACT: Chronic | ICD-10-CM

## 2021-09-13 DIAGNOSIS — U07.1 COVID-19: ICD-10-CM

## 2021-09-13 DIAGNOSIS — Z98.890 OTHER SPECIFIED POSTPROCEDURAL STATES: Chronic | ICD-10-CM

## 2021-09-13 PROCEDURE — M0243: CPT

## 2021-09-13 RX ORDER — SODIUM CHLORIDE 9 MG/ML
250 INJECTION INTRAMUSCULAR; INTRAVENOUS; SUBCUTANEOUS
Refills: 0 | Status: COMPLETED | OUTPATIENT
Start: 2021-09-13 | End: 2021-09-13

## 2021-09-13 RX ADMIN — SODIUM CHLORIDE 310 MILLILITER(S): 9 INJECTION INTRAMUSCULAR; INTRAVENOUS; SUBCUTANEOUS at 08:31

## 2021-09-13 NOTE — CHART NOTE - NSCHARTNOTEFT_GEN_A_CORE
I have reviewed the  Casirivimab/Imdevimab Emergency Use Authorization (EAU) and I have provided the patient or patient's caregiver with the following information:  1. FDA has authorized emergency use of Casirivimab/Imdevimab , which is not FDA-approved biologic product.  2. The patient or patient's caregiver has the option to accept or refuse administration of Casirivimab  3. The significant risks and benefits are unknown.  4. Information on available alternative treatments and risks and benefits of those alternatives.    Discharge:  T(C): 36.8 (09-13-21 @ 09:41), Max: 37.2 (09-13-21 @ 08:30)  HR: 69 (09-13-21 @ 09:41) (65 - 69)  BP: 148/84 (09-13-21 @ 09:41) (140/85 - 156/96)  RR: 18 (09-13-21 @ 09:41) (18 - 18)  SpO2: 98% (09-13-21 @ 09:41) (96% - 99%)  Patient tolerated infusion well denies complaints of chest pain/SOB/dizzines/ palps  VSS for discharge home  D/C instructions given/ fact sheet included.  Patient to follow-up with PCP as needed
CC: Monoclonal Antibody Infusion/COVID 19 Positive      History: Patient presents for infusion of monoclonal antibody infusion. Patient has been screened and was deemed to be a candidate.    Exposure: Was out in [place] with friends on vacation last week, Wife/pt now COVID +    Symptoms/ Criteria: fever, chills, cough, chest tightness, headache    Inclusion Criteria: age > 65 years, DM HTN DVT on AC, MI    Date of Positive Test: verified by clinical call center     Date of symptom onset: 9/9    Vaccine status: Pfizer 4/21    PMHx:  Infection due to severe acute respiratory syndrome coronavirus 2 (SARS-CoV-2)    FH: pancreatic cancer (Father)    HLD (hyperlipidemia)    CAD (coronary artery disease)    MI, old    HTN (hypertension)    LEA (obstructive sleep apnea)    Demyelinating neuropathy    DVT (deep venous thrombosis)    Personal history of PE (pulmonary embolism)    DM (diabetes mellitus)    Osteoarthritis    Demyelinating neuropathy    History of cholecystectomy    H/O Achilles tendon repair    History of percutaneous coronary intervention    S/P IVC filter    Carpal tunnel syndrome of right wrist          T(C): 37.2 (09-13-21 @ 08:30), Max: 37.2 (09-13-21 @ 08:30)  HR: 68 (09-13-21 @ 08:30) (68 - 68)  BP: 146/86 (09-13-21 @ 08:30) (146/86 - 156/96)  RR: 18 (09-13-21 @ 08:30) (18 - 18)  SpO2: 96% (09-13-21 @ 08:30) (96% - 99%)      PE:   Appearance: NAD	  HEENT:   Normal oral mucosa.   Lymphatic: No lymphadenopathy  Cardiovascular: Normal S1 S2, No JVD, No murmurs, No edema  Respiratory: Lungs clear to auscultation	  Gastrointestinal:  Soft, Non-tender. No guarding   Skin: warm and dry  Neurologic: Non-focal  Extremities: Normal range of motion.     ASSESSMENT:  Pt is a 66y year old Male with PMH  MI DVT on xarelto, DM HTN L knee rplacement 2 weeks ago Covid +  referred to the infusion center for Monoclonal antibody infusion (Regeneron).        PLAN:  - infusion procedure explained to patient   - Consent for monoclonal antibody infusion obtained   - Risk & benefits discussed/all questions answered  - infuse Regeneron per protocol  - will observe patient for one hour post infusion  and then if stable discharge home with outpt follow up as planned by PMD.        - Patient tolerated infusion well denies complaints of chest pain/SOB/dizziness/ palpitations  - VSS for discharge home  - D/C instructions given/ fact sheet included.  - Patient to follow-up with PCP as needed.

## 2022-03-22 NOTE — H&P PST ADULT - VISION (WITH CORRECTIVE LENSES IF THE PATIENT USUALLY WEARS THEM):
[Normal] : normal sclera/conjunctiva, pupils are equal, round and reactive to light and extraocular movements are intact [Normal Outer Ear/Nose] : the outer ears and nose were normal in appearance [Supple] : supple [No Respiratory Distress] : no respiratory distress  [No Accessory Muscle Use] : no accessory muscle use [Coordination Grossly Intact] : coordination grossly intact [No Focal Deficits] : no focal deficits [Normal Gait] : normal gait [Normal Affect] : the affect was normal [Normal Insight/Judgement] : insight and judgment were intact Normal vision: sees adequately in most situations; can see medication labels, newsprint

## 2022-06-10 NOTE — ASU PREOP CHECKLIST - VIA
PRE-SEDATION ASSESSMENT  6/10/2022    CONSENT  Consent for procedure and sedation obtained: Yes    MEDICAL HISTORY  Possible Pregnancy (LMP): No    PHYSICAL EXAM  History and Physical Reviewed: Patient has valid H&P within 30 days. I have reviewed and there are no changes.  Airway Risk History: No previous complications  Airway Anatomy : Class II  Heart : Normal  Lungs : Normal  LOC/Mental Status : Normal    OTHER FINDINGS  Reviewed current medications and allergies: Yes  Pertinent lab/diagnostic test reviewed: Yes    SEDATION RISK ASSESSMENT  Risk Status ASA: Class II   Plan for Sedation: Moderate Sedation  EKG Monitoring: Yes    NARRATIVE FINDINGS     The plan is for this patient to receive moderate sedation consisting of benzodiazepine and or opioid.  It is medically necessary that this patient requires IV sedation due to the patient's anxiety level, inability to tolerate pain discomfort and previous negative experience with needle injections.  The patient is suitable to undertake sedation.  The risks and benefits as well as alternative options have been discussed with the patient/decision-maker.    Wayne Crook MD  8:23 AM  06/10/22    
stretcher

## 2022-06-20 NOTE — PATIENT PROFILE ADULT - NSPRESCRALCSIXMORE_GEN_A_NUR
"Pregnancy risks              1.  Depression/ Anxiety: history of suicidal ideaiton.  Followed by Psychiatry.  Stopped lamictal at 5 weeks.  Currently on cymbalta- discussed risks.  Counseling offered- declined.                 2. Teen pregnancy: discussed increased risks of pre-eclampsia and low birth weight.     Prenatal care plan              - OB labs:  Blood type O pos,  immune Rubella, all others are normal              - First trimester screening:  Discussed considering              - Second trimester screening:  Discussed considering              - Pain management:  Will discuss at the future visit              - Ped:  will discuss at future visit              - Circumcision if boy: Will discuss at the future visit              - BC: Will discuss at the future visit              - Breast feeding:  Will discuss in the future visits              - Covid 19 vaccine: declined        SUBJECTIVE:     HPI:    This is a 16 year old female patient,  who presents for her first obstetrical visit.    ARNOLDO: 2023, by Last Menstrual Period.  She is 9w6d weeks.  Her cycles are regular.  Her last menstrual period was normal.   Since her LMP, she has experienced  nausea and fatigue).   She denies pelvic pain, urinary urgency, urinary frequency and vaginal bleeding.    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      She has a history of  1st pregnancy    Since her last LMP she denies use of alcohol, tobacco and street drugs.    Past medical, surgical, social and family history were reviewed " and updated in [facility].        Current Outpatient Medications   Medication     DULoxetine (CYMBALTA) 60 MG capsule     ondansetron (ZOFRAN ODT) 4 MG ODT tab     Prenatal Vit-Fe Fumarate-FA (PRENATAL MULTIVITAMIN W/IRON) 27-0.8 MG tablet     No current facility-administered medications for this visit.       ROS:   12 point review of systems negative other than symptoms noted below or in the HPI.      OBJECTIVE:     EXAM:  /62   Pulse (!) 137   Temp 99  F (37.2  C) (Temporal)   Resp 18   Wt 53.1 kg (117 lb)   LMP 2022 (Approximate)   SpO2 99%   BMI 18.07 kg/m   Body mass index is 18.07 kg/m .    GENERAL: healthy, alert and no distress  EYES: Eyes grossly normal to inspection, PERRL and conjunctivae and sclerae normal  HENT: ear canals and TM's normal, nose and mouth without ulcers or lesions  NECK: no adenopathy, no asymmetry, masses, or scars and thyroid normal to palpation  RESP: lungs clear to auscultation - no rales, rhonchi or wheezes  BREAST: normal without masses, tenderness or nipple discharge and no palpable axillary masses or adenopathy  CV: regular rate and rhythm, normal S1 S2, no S3 or S4, no murmur, click or rub, no peripheral edema and peripheral pulses strong  ABDOMEN: soft, nontender, no hepatosplenomegaly, no masses and bowel sounds normal   (female): normal female external genitalia, normal urethral meatus, vaginal mucosa pink, moist, well rugated, and normal cervix/adnexa/uterus without masses or discharge  MS: no gross musculoskeletal defects noted, no edema  SKIN: no suspicious lesions or rashes  NEURO: Normal strength and tone, mentation intact and speech normal  PSYCH: mentation appears normal, affect normal/bright    ASSESSMENT/PLAN:       ICD-10-CM    1. High risk teen pregnancy in first trimester  O09.891 Primary Care - Care Coordination Referral   2. Major depressive disorder, recurrent, moderate (H)  F33.1    3. Generalized anxiety disorder  F41.1        16 year old ,  9w6d weeks of pregnancy with ARNOLDO of 1/17/2023, by Last Menstrual Period      Counseling given:   - Follow up in 3 weeks for return OB visit.  - Recommended weight gain for pregnancy: 25-35 lbs.      PLAN/PATIENT INSTRUCTIONS:    Routine Prenatal Care:  -Discussed genetic screening options, including sequential and quad testing/AFP, cell-free DNA as well as diagnostic testing including amniocentesis. Patient would like to think about further, will discuss next visit  -Discussed Cystic Fibrosis and SMA carrier screening, patient would like to think about further, will discuss next visit  -Discussed ordered labs and ultrasound,   all questions answered.      -Folder given, outlining exercise, weight gain, schedule of visits, routine and indicated ultrasounds, prenatal vitamins and childbirth education.  Desires delivery at [facility]      Father of baby would like paternity test ASAP.  Discussed tests done at this facility are amniocentesis which I do not recommend or after delivery through [facility].      Has depression and anxiety managed by Dr. Brantley with [facility].  Has stopped lamictal.  Has had suicidal ideation in past and tried other meds with the cymbalta being the best fit.  Discussed risks of continuing cymbalta vs. Changing to another medication.      Teen pregnancy- parents supportive.  Will reach out to care coordination for resources.      Follow closely with Dr. Batres        Return in 3 weeks for routine OB care      Mandi Serrano NP   Less than monthly

## 2022-08-05 ENCOUNTER — NON-APPOINTMENT (OUTPATIENT)
Age: 67
End: 2022-08-05

## 2022-08-05 DIAGNOSIS — Z78.9 OTHER SPECIFIED HEALTH STATUS: ICD-10-CM

## 2022-08-05 DIAGNOSIS — Z83.438 FAMILY HISTORY OF OTHER DISORDER OF LIPOPROTEIN METABOLISM AND OTHER LIPIDEMIA: ICD-10-CM

## 2022-08-05 DIAGNOSIS — I25.2 OLD MYOCARDIAL INFARCTION: ICD-10-CM

## 2022-08-05 DIAGNOSIS — Z80.3 FAMILY HISTORY OF MALIGNANT NEOPLASM OF BREAST: ICD-10-CM

## 2022-08-05 DIAGNOSIS — F12.90 CANNABIS USE, UNSPECIFIED, UNCOMPLICATED: ICD-10-CM

## 2022-08-05 RX ORDER — RIVAROXABAN 20 MG/1
20 TABLET, FILM COATED ORAL
Qty: 90 | Refills: 1 | Status: ACTIVE | COMMUNITY

## 2022-08-05 RX ORDER — PREGABALIN 100 MG/1
100 CAPSULE ORAL
Refills: 0 | Status: ACTIVE | COMMUNITY
Start: 2022-08-05

## 2022-08-05 RX ORDER — ASPIRIN 81 MG
81 TABLET, DELAYED RELEASE (ENTERIC COATED) ORAL
Refills: 0 | Status: DISCONTINUED | COMMUNITY
End: 2022-08-05

## 2022-08-05 RX ORDER — TRIAMTERENE AND HYDROCHLOROTHIAZIDE 37.5; 25 MG/1; MG/1
37.5-25 CAPSULE ORAL
Refills: 0 | Status: ACTIVE | COMMUNITY

## 2022-08-05 RX ORDER — CETIRIZINE HYDROCHLORIDE 10 MG/1
10 TABLET, COATED ORAL
Refills: 0 | Status: ACTIVE | COMMUNITY
Start: 2022-08-05

## 2022-08-05 RX ORDER — METOPROLOL TARTRATE 25 MG/1
25 TABLET, FILM COATED ORAL
Refills: 0 | Status: DISCONTINUED | COMMUNITY
End: 2022-08-05

## 2022-08-05 RX ORDER — EZETIMIBE AND SIMVASTATIN 10; 40 MG/1; MG/1
10-40 TABLET ORAL
Refills: 0 | Status: ACTIVE | COMMUNITY

## 2022-08-08 ENCOUNTER — APPOINTMENT (OUTPATIENT)
Dept: CARDIOLOGY | Facility: CLINIC | Age: 67
End: 2022-08-08

## 2022-08-08 ENCOUNTER — NON-APPOINTMENT (OUTPATIENT)
Age: 67
End: 2022-08-08

## 2022-08-08 VITALS
DIASTOLIC BLOOD PRESSURE: 82 MMHG | BODY MASS INDEX: 41.52 KG/M2 | SYSTOLIC BLOOD PRESSURE: 145 MMHG | HEART RATE: 68 BPM | RESPIRATION RATE: 16 BRPM | OXYGEN SATURATION: 96 % | HEIGHT: 70 IN | WEIGHT: 290 LBS

## 2022-08-08 DIAGNOSIS — J45.909 UNSPECIFIED ASTHMA, UNCOMPLICATED: ICD-10-CM

## 2022-08-08 PROCEDURE — 99204 OFFICE O/P NEW MOD 45 MIN: CPT

## 2022-08-08 PROCEDURE — 93000 ELECTROCARDIOGRAM COMPLETE: CPT

## 2022-08-08 RX ORDER — OXYCODONE AND ACETAMINOPHEN 5; 325 MG/1; MG/1
5-325 TABLET ORAL
Qty: 12 | Refills: 0 | Status: COMPLETED | COMMUNITY
Start: 2022-05-12

## 2022-08-08 RX ORDER — ICOSAPENT ETHYL 1 G/1
1 CAPSULE ORAL
Qty: 320 | Refills: 0 | Status: ACTIVE | COMMUNITY
Start: 2022-04-14

## 2022-08-08 RX ORDER — AMOXICILLIN 500 MG/1
500 CAPSULE ORAL
Qty: 21 | Refills: 0 | Status: COMPLETED | COMMUNITY
Start: 2022-05-12

## 2022-08-08 RX ORDER — ALBUTEROL SULFATE 90 UG/1
108 (90 BASE) INHALANT RESPIRATORY (INHALATION)
Qty: 8 | Refills: 0 | Status: ACTIVE | COMMUNITY
Start: 2022-05-13

## 2022-08-08 RX ORDER — AZELASTINE HYDROCHLORIDE 137 UG/1
0.1 SPRAY, METERED NASAL
Qty: 30 | Refills: 0 | Status: COMPLETED | COMMUNITY
Start: 2022-05-13

## 2022-08-08 RX ORDER — DOXYCYCLINE HYCLATE 100 MG/1
100 CAPSULE ORAL
Qty: 2 | Refills: 0 | Status: COMPLETED | COMMUNITY
Start: 2022-05-25

## 2022-08-08 RX ORDER — FLUTICASONE PROPIONATE 50 UG/1
50 SPRAY, METERED NASAL
Qty: 16 | Refills: 0 | Status: DISCONTINUED | COMMUNITY
Start: 2022-05-13

## 2022-08-08 RX ORDER — LATANOPROST/PF 0.005 %
0.01 DROPS OPHTHALMIC (EYE)
Qty: 8 | Refills: 0 | Status: ACTIVE | COMMUNITY
Start: 2022-07-11

## 2022-08-08 RX ORDER — IBUPROFEN 600 MG/1
600 TABLET, FILM COATED ORAL
Qty: 24 | Refills: 0 | Status: COMPLETED | COMMUNITY
Start: 2022-05-12

## 2022-08-08 NOTE — HISTORY OF PRESENT ILLNESS
[FreeTextEntry1] : 67 year old man s/p RCA stetn fro unstable angina 2008.  repeat cath in 2017 showed no residual CAD.  Risk factors include diabetes, HLD and HTN.  Recently he has noted fatigue.  He has not had any chest discomfort.  History of multiple PE and is on Xarelto for likely hypercoagulable disorder.

## 2022-08-08 NOTE — DISCUSSION/SUMMARY
[FreeTextEntry1] : 67 year old male s/p stent in 2008.  No angiographic progression of disease 2017 and no ischemia on nuclear 2020.  No ischemic symptoms.  Abnormal EKG with PVC's and nonspecific ST changes.  Pharmacologic stress ordered.  HLD with high triglyderides now on Vasceoa and Vytorin.  Repeat lipid profile ordered.  Hypercoagulability continue Xarelto.

## 2022-08-12 ENCOUNTER — RESULT REVIEW (OUTPATIENT)
Age: 67
End: 2022-08-12

## 2022-08-12 ENCOUNTER — OUTPATIENT (OUTPATIENT)
Dept: OUTPATIENT SERVICES | Facility: HOSPITAL | Age: 67
LOS: 1 days | End: 2022-08-12
Payer: MEDICARE

## 2022-08-12 DIAGNOSIS — I25.10 ATHEROSCLEROTIC HEART DISEASE OF NATIVE CORONARY ARTERY WITHOUT ANGINA PECTORIS: ICD-10-CM

## 2022-08-12 DIAGNOSIS — Z98.890 OTHER SPECIFIED POSTPROCEDURAL STATES: Chronic | ICD-10-CM

## 2022-08-12 DIAGNOSIS — G56.01 CARPAL TUNNEL SYNDROME, RIGHT UPPER LIMB: Chronic | ICD-10-CM

## 2022-08-12 DIAGNOSIS — Z95.828 PRESENCE OF OTHER VASCULAR IMPLANTS AND GRAFTS: Chronic | ICD-10-CM

## 2022-08-12 DIAGNOSIS — Z90.49 ACQUIRED ABSENCE OF OTHER SPECIFIED PARTS OF DIGESTIVE TRACT: Chronic | ICD-10-CM

## 2022-08-12 PROCEDURE — 78452 HT MUSCLE IMAGE SPECT MULT: CPT | Mod: 26,MH

## 2022-08-12 PROCEDURE — 93016 CV STRESS TEST SUPVJ ONLY: CPT

## 2022-08-12 PROCEDURE — 78452 HT MUSCLE IMAGE SPECT MULT: CPT | Mod: MH

## 2022-08-12 PROCEDURE — 93017 CV STRESS TEST TRACING ONLY: CPT

## 2022-08-12 PROCEDURE — A9500: CPT

## 2022-08-12 PROCEDURE — 93018 CV STRESS TEST I&R ONLY: CPT

## 2022-08-12 RX ORDER — RIVAROXABAN 15 MG-20MG
1 KIT ORAL
Qty: 0 | Refills: 0 | DISCHARGE

## 2022-08-12 RX ORDER — REGADENOSON 0.08 MG/ML
0.4 INJECTION, SOLUTION INTRAVENOUS ONCE
Refills: 0 | Status: COMPLETED | OUTPATIENT
Start: 2022-08-12 | End: 2022-08-12

## 2022-08-12 RX ORDER — GABAPENTIN 400 MG/1
2 CAPSULE ORAL
Qty: 0 | Refills: 0 | DISCHARGE

## 2022-08-12 RX ADMIN — REGADENOSON 0.4 MILLIGRAM(S): 0.08 INJECTION, SOLUTION INTRAVENOUS at 09:40

## 2022-08-13 DIAGNOSIS — I25.10 ATHEROSCLEROTIC HEART DISEASE OF NATIVE CORONARY ARTERY WITHOUT ANGINA PECTORIS: ICD-10-CM

## 2022-08-29 ENCOUNTER — LABORATORY RESULT (OUTPATIENT)
Age: 67
End: 2022-08-29

## 2022-12-06 ENCOUNTER — OFFICE (OUTPATIENT)
Dept: URBAN - METROPOLITAN AREA CLINIC 102 | Facility: CLINIC | Age: 67
Setting detail: OPHTHALMOLOGY
End: 2022-12-06
Payer: MEDICARE

## 2022-12-06 DIAGNOSIS — H25.11: ICD-10-CM

## 2022-12-06 DIAGNOSIS — H25.13: ICD-10-CM

## 2022-12-06 PROCEDURE — 92014 COMPRE OPH EXAM EST PT 1/>: CPT | Performed by: OPHTHALMOLOGY

## 2022-12-06 PROCEDURE — 92136 OPHTHALMIC BIOMETRY: CPT | Performed by: OPHTHALMOLOGY

## 2022-12-06 ASSESSMENT — REFRACTION_MANIFEST
OD_VA1: 20/40-
OS_SPHERE: -2.00
OD_AXIS: 90
OS_ADD: +2.50
OD_ADD: +2.50
OD_CYLINDER: -0.75
OS_CYLINDER: 0.00
OD_SPHERE: -1.75
OS_AXIS: 090
OD_AXIS: 090
OD_CYLINDER: -0.50
OS_SPHERE: -2.50
OD_VA1: 20/30+2
OD_SPHERE: -1.50
OD_VA1: 20/30-1
OS_CYLINDER: -0.50
OS_VA1: 20/30+
OS_CYLINDER: -SPH
OS_VA1: 20/30
OS_SPHERE: -1.75
OD_SPHERE: -2.75
OD_CYLINDER: -0.50
OS_AXIS: 000
OS_VA1: 20/40
OU_VA: 20/40
OD_AXIS: 086

## 2022-12-06 ASSESSMENT — REFRACTION_AUTOREFRACTION
OD_SPHERE: -2.25
OS_AXIS: 91
OS_SPHERE: -2.50
OD_CYLINDER: -0.75
OS_CYLINDER: -0.50
OD_AXIS: 84

## 2022-12-06 ASSESSMENT — REFRACTION_CURRENTRX
OS_VPRISM_DIRECTION: SV
OS_VPRISM_DIRECTION: SV
OD_VPRISM_DIRECTION: SV
OS_SPHERE: +1.00
OS_AXIS: 0
OS_CYLINDER: -0.50
OS_SPHERE: -1.75
OD_AXIS: 89
OD_OVR_VA: 20/
OD_SPHERE: +1.50
OD_AXIS: 0
OS_CYLINDER: SPH
OD_CYLINDER: -0.50
OD_VPRISM_DIRECTION: SV
OD_CYLINDER: SPH
OD_OVR_VA: 20/
OS_OVR_VA: 20/
OS_AXIS: 94
OS_OVR_VA: 20/
OD_SPHERE: -1.50

## 2022-12-06 ASSESSMENT — AXIALLENGTH_DERIVED
OD_AL: 24.1547
OS_AL: 24.1066
OS_AL: 24.4156
OD_AL: 24.053
OD_AL: 24.4126
OD_AL: 24.623
OS_AL: 24.3117

## 2022-12-06 ASSESSMENT — CONFRONTATIONAL VISUAL FIELD TEST (CVF)
OS_FINDINGS: FULL
OD_FINDINGS: FULL

## 2022-12-06 ASSESSMENT — VISUAL ACUITY
OS_BCVA: 20/60
OD_BCVA: 20/50-1

## 2022-12-06 ASSESSMENT — TONOMETRY
OS_IOP_MMHG: 16
OD_IOP_MMHG: 16

## 2022-12-06 ASSESSMENT — SPHEQUIV_DERIVED
OD_SPHEQUIV: -3.125
OS_SPHEQUIV: -2.75
OD_SPHEQUIV: -1.75
OS_SPHEQUIV: -2.5
OD_SPHEQUIV: -2
OS_SPHEQUIV: -2
OD_SPHEQUIV: -2.625

## 2022-12-06 ASSESSMENT — KERATOMETRY
OS_K2POWER_DIOPTERS: 44.25
OS_K1POWER_DIOPTERS: 44.25
OD_CYLPOWER_DEGREES: 0.25
OD_K2POWER_DIOPTERS: 44.25
OS_K2POWER_DIOPTERS: 44.25
OD_K1POWER_DIOPTERS: 44.00
OS_AXISANGLE2_DEGREES: 90
OS_CYLAXISANGLE_DEGREES: 180
OS_AXISANGLE_DEGREES: 180
OD_AXISANGLE_DEGREES: 113
OD_AXISANGLE2_DEGREES: 23
OD_CYLAXISANGLE_DEGREES: 23
OS_K1POWER_DIOPTERS: 44.25
OS_AXISANGLE_DEGREES: 90
OD_K1POWER_DIOPTERS: 44.00
OD_K2POWER_DIOPTERS: 44.25
OD_AXISANGLE_DEGREES: 23
METHOD_AUTO_MANUAL: AUTO
OS_K1K2_AVERAGE: 44.25
OD_K1K2_AVERAGE: 44.125

## 2022-12-06 ASSESSMENT — PACHYMETRY
OD_CT_UM: 581
OS_CT_UM: 580
OD_CT_CORRECTION: -3
OS_CT_CORRECTION: -3

## 2022-12-17 ENCOUNTER — NON-APPOINTMENT (OUTPATIENT)
Age: 67
End: 2022-12-17

## 2022-12-21 ENCOUNTER — NON-APPOINTMENT (OUTPATIENT)
Age: 67
End: 2022-12-21

## 2023-01-05 ENCOUNTER — NON-APPOINTMENT (OUTPATIENT)
Age: 68
End: 2023-01-05

## 2023-01-05 ENCOUNTER — EMERGENCY (EMERGENCY)
Facility: HOSPITAL | Age: 68
LOS: 0 days | Discharge: HOME | End: 2023-01-05
Attending: EMERGENCY MEDICINE
Payer: MEDICARE

## 2023-01-05 VITALS
OXYGEN SATURATION: 97 % | DIASTOLIC BLOOD PRESSURE: 92 MMHG | RESPIRATION RATE: 18 BRPM | TEMPERATURE: 99 F | SYSTOLIC BLOOD PRESSURE: 151 MMHG | HEART RATE: 88 BPM

## 2023-01-05 VITALS — HEIGHT: 70 IN | WEIGHT: 285.06 LBS

## 2023-01-05 DIAGNOSIS — E78.5 HYPERLIPIDEMIA, UNSPECIFIED: ICD-10-CM

## 2023-01-05 DIAGNOSIS — G56.01 CARPAL TUNNEL SYNDROME, RIGHT UPPER LIMB: Chronic | ICD-10-CM

## 2023-01-05 DIAGNOSIS — G56.01 CARPAL TUNNEL SYNDROME, RIGHT UPPER LIMB: ICD-10-CM

## 2023-01-05 DIAGNOSIS — Z95.828 PRESENCE OF OTHER VASCULAR IMPLANTS AND GRAFTS: Chronic | ICD-10-CM

## 2023-01-05 DIAGNOSIS — E11.9 TYPE 2 DIABETES MELLITUS WITHOUT COMPLICATIONS: ICD-10-CM

## 2023-01-05 DIAGNOSIS — M79.661 PAIN IN RIGHT LOWER LEG: ICD-10-CM

## 2023-01-05 DIAGNOSIS — Z98.890 OTHER SPECIFIED POSTPROCEDURAL STATES: Chronic | ICD-10-CM

## 2023-01-05 DIAGNOSIS — M19.90 UNSPECIFIED OSTEOARTHRITIS, UNSPECIFIED SITE: ICD-10-CM

## 2023-01-05 DIAGNOSIS — G47.33 OBSTRUCTIVE SLEEP APNEA (ADULT) (PEDIATRIC): ICD-10-CM

## 2023-01-05 DIAGNOSIS — Z86.718 PERSONAL HISTORY OF OTHER VENOUS THROMBOSIS AND EMBOLISM: ICD-10-CM

## 2023-01-05 DIAGNOSIS — M76.62 ACHILLES TENDINITIS, LEFT LEG: ICD-10-CM

## 2023-01-05 DIAGNOSIS — Z86.711 PERSONAL HISTORY OF PULMONARY EMBOLISM: ICD-10-CM

## 2023-01-05 DIAGNOSIS — M17.12 UNILATERAL PRIMARY OSTEOARTHRITIS, LEFT KNEE: ICD-10-CM

## 2023-01-05 DIAGNOSIS — Z79.84 LONG TERM (CURRENT) USE OF ORAL HYPOGLYCEMIC DRUGS: ICD-10-CM

## 2023-01-05 DIAGNOSIS — Z90.49 ACQUIRED ABSENCE OF OTHER SPECIFIED PARTS OF DIGESTIVE TRACT: Chronic | ICD-10-CM

## 2023-01-05 DIAGNOSIS — M75.41 IMPINGEMENT SYNDROME OF RIGHT SHOULDER: ICD-10-CM

## 2023-01-05 DIAGNOSIS — Z79.01 LONG TERM (CURRENT) USE OF ANTICOAGULANTS: ICD-10-CM

## 2023-01-05 DIAGNOSIS — Z90.49 ACQUIRED ABSENCE OF OTHER SPECIFIED PARTS OF DIGESTIVE TRACT: ICD-10-CM

## 2023-01-05 DIAGNOSIS — G61.81 CHRONIC INFLAMMATORY DEMYELINATING POLYNEURITIS: ICD-10-CM

## 2023-01-05 DIAGNOSIS — I25.10 ATHEROSCLEROTIC HEART DISEASE OF NATIVE CORONARY ARTERY WITHOUT ANGINA PECTORIS: ICD-10-CM

## 2023-01-05 DIAGNOSIS — I25.2 OLD MYOCARDIAL INFARCTION: ICD-10-CM

## 2023-01-05 DIAGNOSIS — Z82.69 FAMILY HISTORY OF OTHER DISEASES OF THE MUSCULOSKELETAL SYSTEM AND CONNECTIVE TISSUE: ICD-10-CM

## 2023-01-05 DIAGNOSIS — L03.90 CELLULITIS, UNSPECIFIED: ICD-10-CM

## 2023-01-05 DIAGNOSIS — I10 ESSENTIAL (PRIMARY) HYPERTENSION: ICD-10-CM

## 2023-01-05 PROCEDURE — 99283 EMERGENCY DEPT VISIT LOW MDM: CPT

## 2023-01-05 RX ORDER — CEPHALEXIN 500 MG
1000 CAPSULE ORAL ONCE
Refills: 0 | Status: COMPLETED | OUTPATIENT
Start: 2023-01-05 | End: 2023-01-05

## 2023-01-05 RX ORDER — CEPHALEXIN 500 MG
2 CAPSULE ORAL
Qty: 28 | Refills: 0
Start: 2023-01-05 | End: 2023-01-11

## 2023-01-05 RX ADMIN — Medication 1000 MILLIGRAM(S): at 20:37

## 2023-01-05 NOTE — ED PROVIDER NOTE - NSFOLLOWUPINSTRUCTIONS_ED_ALL_ED_FT
Patient Name: CHIN ARAYA  Caregiver: Mehdi Boyer NI  Cellulitis, Adult       Cellulitis is a skin infection. The infected area is usually warm, red, swollen, and tender. This condition occurs most often in the arms and lower legs. The infection can travel to the muscles, blood, and underlying tissue and become serious. It is very important to get treated for this condition.    What are the causes?  Cellulitis is caused by bacteria. The bacteria enter through a break in the skin, such as a cut, burn, insect bite, open sore, or crack.    What increases the risk?  This condition is more likely to occur in people who:    Have a weak body defense system (immune system).  Have open wounds on the skin, such as cuts, burns, bites, and scrapes. Bacteria can enter the body through these open wounds.  Are older than 60 years of age.  Have diabetes.  Have a type of long-lasting (chronic) liver disease (cirrhosis) or kidney disease.  Are obese.  Have a skin condition such as:    Itchy rash (eczema).  Slow movement of blood in the veins (venous stasis).  Fluid buildup below the skin (edema).  Have had radiation therapy.  Use IV drugs.    What are the signs or symptoms?  Symptoms of this condition include:    Redness, streaking, or spotting on the skin.  Swollen area of the skin.  Tenderness or pain when an area of the skin is touched.  Warm skin.  A fever.  Chills.  Blisters.    How is this diagnosed?  This condition is diagnosed based on a medical history and physical exam. You may also have tests, including:    Blood tests.  Imaging tests.    How is this treated?  Treatment for this condition may include:    Medicines, such as antibiotic medicines or medicines to treat allergies (antihistamines).  Supportive care, such as rest and application of cold or warm cloths (compresses) to the skin.  Hospital care, if the condition is severe.    The infection usually starts to get better within 1–2 days of treatment.    Follow these instructions at home:         Medicines    Take over-the-counter and prescription medicines only as told by your health care provider.  If you were prescribed an antibiotic medicine, take it as told by your health care provider. Do not stop taking the antibiotic even if you start to feel better.        General instructions    Drink enough fluid to keep your urine pale yellow.  Do not touch or rub the infected area.  Raise (elevate) the infected area above the level of your heart while you are sitting or lying down.  Apply warm or cold compresses to the affected area as told by your health care provider.  Keep all follow-up visits as told by your health care provider. This is important. These visits let your health care provider make sure a more serious infection is not developing.    Contact a health care provider if:  You have a fever.  Your symptoms do not begin to improve within 1–2 days of starting treatment.  Your bone or joint underneath the infected area becomes painful after the skin has healed.  Your infection returns in the same area or another area.  You notice a swollen bump in the infected area.  You develop new symptoms.  You have a general ill feeling (malaise) with muscle aches and pains.    Get help right away if:  Your symptoms get worse.  You feel very sleepy.  You develop vomiting or diarrhea that persists.  You notice red streaks coming from the infected area.  Your red area gets larger or turns dark in color.    These symptoms may represent a serious problem that is an emergency. Do not wait to see if the symptoms will go away. Get medical help right away. Call your local emergency services (911 in the [place] Do not drive yourself to the hospital.    Summary  Cellulitis is a skin infection. This condition occurs most often in the arms and lower legs.  Treatment for this condition may include medicines, such as antibiotic medicines or antihistamines.  Take over-the-counter and prescription medicines only as told by your health care provider. If you were prescribed an antibiotic medicine, do not stop taking the antibiotic even if you start to feel better.  Contact a health care provider if your symptoms do not begin to improve within 1–2 days of starting treatment or your symptoms get worse.  Keep all follow-up visits as told by your health care provider. This is important. These visits let your health care provider make sure that a more serious infection is not developing.    ADDITIONAL NOTES AND INSTRUCTIONS    Please follow up with your Primary MD in 24-48 hr.  Seek immediate medical care for any new/worsening signs or symptoms.     Document Released: 9/27/2006 Document Revised: 12/28/2020 Document Reviewed: 5/9/2019  [employer] Interactive Patient Education ©2019 [employer] This information is not intended to replace advice given to you by your health care provider. Make sure you discuss any questions you have with your health care provider.

## 2023-01-05 NOTE — ED ADULT TRIAGE NOTE - CHIEF COMPLAINT QUOTE
patient c/o Right lower extremity pain, redness, edema.  reports worsening pain x 2 days with redness spreading from Right foot up to right knee. hx gout, PEs (on xarelto).  denies fever, no recent travel.

## 2023-01-05 NOTE — ED PROVIDER NOTE - NSICDXPASTMEDICALHX_GEN_ALL_CORE_FT
PAST MEDICAL HISTORY:  CAD (coronary artery disease)     Demyelinating neuropathy     DM (diabetes mellitus)     DVT (deep venous thrombosis) unprovoked DVT/PE- as per patient his mother and sister also had DVT- genetic testing was done but negative    HLD (hyperlipidemia)     HTN (hypertension)     MI, old x2- stentx1    LEA (obstructive sleep apnea) CPAP    Osteoarthritis     Personal history of PE (pulmonary embolism)

## 2023-01-05 NOTE — ED PROVIDER NOTE - CLINICAL SUMMARY MEDICAL DECISION MAKING FREE TEXT BOX
gout vs cellutlius already on DOAC if dvt no signs motor or sensory deficits. no chesat pain or sob. no motor or sensory deficits. wound care precautions given.

## 2023-01-05 NOTE — ED PROVIDER NOTE - PATIENT PORTAL LINK FT
You can access the FollowMyHealth Patient Portal offered by [facility] by registering at the following website: [email or web address]. By joining [employer]’s FollowMyHealth portal, you will also be able to view your health information using other applications (apps) compatible with our system.

## 2023-01-05 NOTE — ED PROVIDER NOTE - NSICDXPASTSURGICALHX_GEN_ALL_CORE_FT
PAST SURGICAL HISTORY:  Carpal tunnel syndrome of right wrist 2019    H/O Achilles tendon repair     History of cholecystectomy     History of percutaneous coronary intervention RCA stent 2008    S/P IVC filter 7/8/2021

## 2023-01-05 NOTE — ED PROVIDER NOTE - OBJECTIVE STATEMENT
Patient presents to ED ED describing right foot pain x3 days.  Somewhat consistent with history of gout but not completely.  No trauma to affected area.  Patient is compliant with his Xarelto.  No right knee or right hip pain patient is ambulating without acute distress pain described as constant worse with movement there is some slight erythema over the right volar surface of right foot.  No motor or sensory deficits no other acute issues symptoms or concerns

## 2023-01-17 ENCOUNTER — NON-APPOINTMENT (OUTPATIENT)
Age: 68
End: 2023-01-17

## 2023-02-03 ENCOUNTER — OFFICE (OUTPATIENT)
Dept: URBAN - METROPOLITAN AREA CLINIC 102 | Facility: CLINIC | Age: 68
Setting detail: OPHTHALMOLOGY
End: 2023-02-03
Payer: MEDICARE

## 2023-02-03 DIAGNOSIS — Z01.812: ICD-10-CM

## 2023-02-03 DIAGNOSIS — Z20.822: ICD-10-CM

## 2023-02-03 PROCEDURE — 99211 OFF/OP EST MAY X REQ PHY/QHP: CPT | Performed by: OPHTHALMOLOGY

## 2023-02-06 ENCOUNTER — NON-APPOINTMENT (OUTPATIENT)
Age: 68
End: 2023-02-06

## 2023-02-08 ENCOUNTER — AMBUL SURGICAL CARE (OUTPATIENT)
Dept: URBAN - METROPOLITAN AREA SURGERY 14 | Facility: SURGERY | Age: 68
Setting detail: OPHTHALMOLOGY
End: 2023-02-08
Payer: MEDICARE

## 2023-02-08 DIAGNOSIS — H25.11: ICD-10-CM

## 2023-02-08 PROCEDURE — 66984 XCAPSL CTRC RMVL W/O ECP: CPT | Performed by: OPHTHALMOLOGY

## 2023-02-09 ENCOUNTER — OFFICE (OUTPATIENT)
Dept: URBAN - METROPOLITAN AREA CLINIC 102 | Facility: CLINIC | Age: 68
Setting detail: OPHTHALMOLOGY
End: 2023-02-09
Payer: MEDICARE

## 2023-02-09 DIAGNOSIS — Z96.1: ICD-10-CM

## 2023-02-09 PROBLEM — H25.12 CATARACT SENILE NUCLEAR SCLEROSIS; LEFT EYE,: Status: ACTIVE | Noted: 2023-01-05

## 2023-02-09 PROCEDURE — 99024 POSTOP FOLLOW-UP VISIT: CPT | Performed by: OPHTHALMOLOGY

## 2023-02-09 ASSESSMENT — AXIALLENGTH_DERIVED
OS_AL: 24.5174
OD_AL: 24.2029
OS_AL: 24.1547
OD_AL: 24.6731
OS_AL: 24.3606
OD_AL: 23.4577
OD_AL: 24.1008

## 2023-02-09 ASSESSMENT — REFRACTION_CURRENTRX
OS_AXIS: 94
OS_AXIS: 0
OD_VPRISM_DIRECTION: SV
OD_VPRISM_DIRECTION: SV
OD_CYLINDER: SPH
OS_CYLINDER: SPH
OS_CYLINDER: -0.50
OD_SPHERE: +1.50
OD_SPHERE: -1.50
OS_SPHERE: -1.75
OD_AXIS: 89
OS_SPHERE: +1.00
OS_OVR_VA: 20/
OS_VPRISM_DIRECTION: SV
OS_OVR_VA: 20/
OD_CYLINDER: -0.50
OD_OVR_VA: 20/
OD_OVR_VA: 20/
OD_AXIS: 0
OS_VPRISM_DIRECTION: SV

## 2023-02-09 ASSESSMENT — REFRACTION_MANIFEST
OS_AXIS: 090
OD_VA1: 20/30+2
OD_VA1: 20/30-1
OD_CYLINDER: -0.75
OD_CYLINDER: -0.50
OS_AXIS: 000
OD_CYLINDER: -0.50
OD_AXIS: 90
OS_CYLINDER: -0.50
OS_CYLINDER: 0.00
OU_VA: 20/40
OD_SPHERE: -2.75
OS_SPHERE: -1.75
OS_CYLINDER: -SPH
OD_ADD: +2.50
OD_AXIS: 090
OD_VA1: 20/40-
OS_VA1: 20/30
OS_ADD: +2.50
OS_VA1: 20/30+
OD_SPHERE: -1.75
OS_VA1: 20/40
OS_SPHERE: -2.50
OS_SPHERE: -2.00
OD_SPHERE: -1.50
OD_AXIS: 086

## 2023-02-09 ASSESSMENT — KERATOMETRY
OS_K1POWER_DIOPTERS: 44.00
OD_AXISANGLE_DEGREES: 075
METHOD_AUTO_MANUAL: AUTO
OS_K2POWER_DIOPTERS: 44.25
OD_K2POWER_DIOPTERS: 44.25
OD_K1POWER_DIOPTERS: 43.75
OS_AXISANGLE_DEGREES: 001

## 2023-02-09 ASSESSMENT — REFRACTION_AUTOREFRACTION
OS_CYLINDER: -0.25
OD_SPHERE: 0.00
OS_SPHERE: -2.75
OS_AXIS: 098
OD_CYLINDER: -0.25
OD_AXIS: 158

## 2023-02-09 ASSESSMENT — SPHEQUIV_DERIVED
OD_SPHEQUIV: -2
OS_SPHEQUIV: -2.5
OD_SPHEQUIV: -3.125
OD_SPHEQUIV: -1.75
OD_SPHEQUIV: -0.125
OS_SPHEQUIV: -2.875
OS_SPHEQUIV: -2

## 2023-02-09 ASSESSMENT — VISUAL ACUITY
OD_BCVA: 20/150
OS_BCVA: 20/25+

## 2023-02-09 ASSESSMENT — PACHYMETRY
OS_CT_UM: 580
OD_CT_CORRECTION: -3
OD_CT_UM: 581
OS_CT_CORRECTION: -3

## 2023-02-09 ASSESSMENT — CORNEAL EDEMA CLINICAL DESCRIPTION: OD_CORNEALEDEMA: T

## 2023-02-09 ASSESSMENT — CONFRONTATIONAL VISUAL FIELD TEST (CVF)
OS_FINDINGS: FULL
OD_FINDINGS: FULL

## 2023-02-09 ASSESSMENT — TONOMETRY: OS_IOP_MMHG: 15

## 2023-02-15 ENCOUNTER — OFFICE (OUTPATIENT)
Dept: URBAN - METROPOLITAN AREA CLINIC 102 | Facility: CLINIC | Age: 68
Setting detail: OPHTHALMOLOGY
End: 2023-02-15
Payer: MEDICARE

## 2023-02-15 DIAGNOSIS — H25.12: ICD-10-CM

## 2023-02-15 PROCEDURE — 99024 POSTOP FOLLOW-UP VISIT: CPT | Performed by: OPHTHALMOLOGY

## 2023-02-15 ASSESSMENT — AXIALLENGTH_DERIVED
OD_AL: 24.2999
OD_AL: 24.623
OS_AL: 24.1066
OD_AL: 24.1547
OS_AL: 24.4097
OD_AL: 24.197
OS_AL: 24.3117
OS_AL: 24.5144
OD_AL: 24.4126
OD_AL: 24.774
OD_AL: 24.053
OS_AL: 24.2029
OD_AL: 23.6953
OS_AL: 24.4156

## 2023-02-15 ASSESSMENT — VISUAL ACUITY
OD_BCVA: 20/50-1
OS_BCVA: 20/60
OS_BCVA: 20/30
OD_BCVA: 20/60

## 2023-02-15 ASSESSMENT — REFRACTION_CURRENTRX
OD_VPRISM_DIRECTION: SV
OS_SPHERE: +1.00
OS_OVR_VA: 20/
OD_AXIS: 89
OS_CYLINDER: -0.50
OD_VPRISM_DIRECTION: SV
OS_AXIS: 94
OD_AXIS: 0
OD_CYLINDER: -0.50
OS_SPHERE: -1.75
OS_VPRISM_DIRECTION: SV
OD_SPHERE: -1.50
OS_CYLINDER: -0.50
OS_OVR_VA: 20/
OD_CYLINDER: -0.50
OD_OVR_VA: 20/
OD_OVR_VA: 20/
OD_AXIS: 0
OD_SPHERE: +1.50
OS_VPRISM_DIRECTION: SV
OS_AXIS: 0
OD_VPRISM_DIRECTION: SV
OS_SPHERE: +1.00
OS_SPHERE: -1.75
OS_CYLINDER: SPH
OD_SPHERE: -1.50
OS_VPRISM_DIRECTION: SV
OS_VPRISM_DIRECTION: SV
OD_CYLINDER: SPH
OD_VPRISM_DIRECTION: SV
OD_OVR_VA: 20/
OS_OVR_VA: 20/
OS_CYLINDER: SPH
OD_AXIS: 89
OD_CYLINDER: SPH
OS_AXIS: 0
OS_OVR_VA: 20/
OD_OVR_VA: 20/
OD_SPHERE: +1.50
OS_AXIS: 94

## 2023-02-15 ASSESSMENT — REFRACTION_AUTOREFRACTION
OD_CYLINDER: -0.50
OS_AXIS: 91
OD_SPHERE: -2.25
OS_AXIS: 090
OD_AXIS: 092
OS_CYLINDER: -0.50
OS_CYLINDER: -1.00
OS_SPHERE: -2.25
OD_SPHERE: -0.25
OS_SPHERE: -2.50
OD_CYLINDER: -0.75
OD_AXIS: 84

## 2023-02-15 ASSESSMENT — REFRACTION_MANIFEST
OS_VA1: 20/30
OS_AXIS: 090
OS_CYLINDER: 0.00
OD_AXIS: 090
OS_VA1: 20/40
OU_VA: 20/40
OD_VA1: 20/30+2
OS_SPHERE: -2.00
OD_CYLINDER: -0.50
OD_CYLINDER: -0.75
OS_VA1: 20/30+
OD_AXIS: 090
OD_CYLINDER: -0.50
OS_VA1: 20/30+
OS_SPHERE: -1.75
OD_AXIS: 086
OD_SPHERE: -1.75
OU_VA: 20/40
OD_VA1: 20/30-1
OS_CYLINDER: -SPH
OS_CYLINDER: -0.50
OD_VA1: 20/30+2
OD_ADD: +2.50
OD_SPHERE: -2.75
OD_CYLINDER: -0.75
OS_AXIS: 000
OD_CYLINDER: -0.50
OD_SPHERE: -2.75
OD_AXIS: 90
OD_AXIS: 90
OS_SPHERE: -2.50
OD_VA1: 20/40-
OS_CYLINDER: 0.00
OS_SPHERE: -1.75
OS_CYLINDER: -SPH
OS_CYLINDER: -0.50
OS_SPHERE: -2.50
OD_SPHERE: -1.50
OD_SPHERE: -1.75
OS_VA1: 20/40
OD_AXIS: 086
OD_VA1: 20/40-
OD_VA1: 20/30-1
OD_CYLINDER: -0.50
OS_AXIS: 000
OS_AXIS: 090
OD_SPHERE: -1.50
OS_ADD: +2.50
OS_ADD: +2.50
OS_VA1: 20/30
OS_SPHERE: -2.00
OD_ADD: +2.50

## 2023-02-15 ASSESSMENT — SPHEQUIV_DERIVED
OD_SPHEQUIV: -0.5
OS_SPHEQUIV: -2.5
OD_SPHEQUIV: -2
OD_SPHEQUIV: -1.75
OD_SPHEQUIV: -2.625
OS_SPHEQUIV: -2.5
OS_SPHEQUIV: -2.75
OD_SPHEQUIV: -1.75
OS_SPHEQUIV: -2
OD_SPHEQUIV: -3.125
OD_SPHEQUIV: -2
OS_SPHEQUIV: -2
OS_SPHEQUIV: -2.75
OD_SPHEQUIV: -3.125

## 2023-02-15 ASSESSMENT — KERATOMETRY
OS_K1POWER_DIOPTERS: 44.25
OS_K1POWER_DIOPTERS: 43.75
OD_K2POWER_DIOPTERS: 44.25
OS_AXISANGLE_DEGREES: 90
OS_K2POWER_DIOPTERS: 44.25
OS_AXISANGLE_DEGREES: 085
OD_K1POWER_DIOPTERS: 43.50
OD_K1POWER_DIOPTERS: 44.00
METHOD_AUTO_MANUAL: AUTO
OD_K2POWER_DIOPTERS: 44.00
OS_K2POWER_DIOPTERS: 44.25
METHOD_AUTO_MANUAL: AUTO
OD_AXISANGLE_DEGREES: 176
OD_AXISANGLE_DEGREES: 23

## 2023-02-15 ASSESSMENT — PACHYMETRY
OD_CT_CORRECTION: -3
OS_CT_UM: 580
OS_CT_CORRECTION: -3
OD_CT_UM: 581

## 2023-02-15 ASSESSMENT — CONFRONTATIONAL VISUAL FIELD TEST (CVF)
OS_FINDINGS: FULL
OD_FINDINGS: FULL

## 2023-02-15 ASSESSMENT — TONOMETRY
OD_IOP_MMHG: 21
OS_IOP_MMHG: 16

## 2023-02-24 ENCOUNTER — OFFICE (OUTPATIENT)
Dept: URBAN - METROPOLITAN AREA CLINIC 102 | Facility: CLINIC | Age: 68
Setting detail: OPHTHALMOLOGY
End: 2023-02-24
Payer: MEDICARE

## 2023-02-24 DIAGNOSIS — H25.12: ICD-10-CM

## 2023-02-24 PROCEDURE — 99211 OFF/OP EST MAY X REQ PHY/QHP: CPT | Performed by: OPHTHALMOLOGY

## 2023-02-24 ASSESSMENT — REFRACTION_AUTOREFRACTION
OS_CYLINDER: -1.00
OD_CYLINDER: -0.50
OS_AXIS: 090
OS_SPHERE: -2.25
OD_AXIS: 092
OD_SPHERE: -0.25

## 2023-02-24 ASSESSMENT — REFRACTION_CURRENTRX
OS_VPRISM_DIRECTION: SV
OS_AXIS: 94
OS_VPRISM_DIRECTION: SV
OD_CYLINDER: -0.50
OS_AXIS: 0
OS_SPHERE: -1.75
OD_AXIS: 89
OD_AXIS: 0
OD_SPHERE: -1.50
OD_VPRISM_DIRECTION: SV
OD_OVR_VA: 20/
OD_SPHERE: +1.50
OD_CYLINDER: SPH
OS_OVR_VA: 20/
OD_OVR_VA: 20/
OS_OVR_VA: 20/
OS_CYLINDER: SPH
OD_VPRISM_DIRECTION: SV
OS_CYLINDER: -0.50
OS_SPHERE: +1.00

## 2023-02-24 ASSESSMENT — VISUAL ACUITY
OS_BCVA: 20/30
OD_BCVA: 20/60

## 2023-02-24 ASSESSMENT — REFRACTION_MANIFEST
OS_VA1: 20/30+
OD_AXIS: 090
OS_CYLINDER: -0.50
OS_ADD: +2.50
OS_CYLINDER: -SPH
OD_VA1: 20/30+2
OS_CYLINDER: 0.00
OD_ADD: +2.50
OS_SPHERE: -1.75
OD_CYLINDER: -0.75
OD_AXIS: 086
OD_VA1: 20/30-1
OS_SPHERE: -2.00
OD_SPHERE: -1.75
OD_AXIS: 90
OS_AXIS: 090
OS_SPHERE: -2.50
OS_AXIS: 000
OS_VA1: 20/30
OD_CYLINDER: -0.50
OS_VA1: 20/40
OD_SPHERE: -1.50
OD_VA1: 20/40-
OD_SPHERE: -2.75
OD_CYLINDER: -0.50
OU_VA: 20/40

## 2023-02-24 ASSESSMENT — SPHEQUIV_DERIVED
OS_SPHEQUIV: -2.5
OD_SPHEQUIV: -0.5
OD_SPHEQUIV: -2
OD_SPHEQUIV: -3.125
OS_SPHEQUIV: -2.75
OD_SPHEQUIV: -1.75
OS_SPHEQUIV: -2

## 2023-02-24 ASSESSMENT — AXIALLENGTH_DERIVED
OD_AL: 24.197
OD_AL: 24.2999
OS_AL: 24.5144
OS_AL: 24.2029
OD_AL: 24.774
OD_AL: 23.6953
OS_AL: 24.4097

## 2023-02-24 ASSESSMENT — KERATOMETRY
OD_K2POWER_DIOPTERS: 44.00
OS_K1POWER_DIOPTERS: 43.75
OD_K1POWER_DIOPTERS: 43.50
OS_AXISANGLE_DEGREES: 085
METHOD_AUTO_MANUAL: AUTO
OS_K2POWER_DIOPTERS: 44.25
OD_AXISANGLE_DEGREES: 176

## 2023-03-01 ENCOUNTER — ASC (OUTPATIENT)
Dept: URBAN - METROPOLITAN AREA SURGERY 8 | Facility: SURGERY | Age: 68
Setting detail: OPHTHALMOLOGY
End: 2023-03-01
Payer: MEDICARE

## 2023-03-01 DIAGNOSIS — H25.12: ICD-10-CM

## 2023-03-01 PROCEDURE — 66984 XCAPSL CTRC RMVL W/O ECP: CPT | Performed by: OPHTHALMOLOGY

## 2023-03-02 ENCOUNTER — OFFICE (OUTPATIENT)
Dept: URBAN - METROPOLITAN AREA CLINIC 102 | Facility: CLINIC | Age: 68
Setting detail: OPHTHALMOLOGY
End: 2023-03-02
Payer: MEDICARE

## 2023-03-02 DIAGNOSIS — H25.12: ICD-10-CM

## 2023-03-02 DIAGNOSIS — Z96.1: ICD-10-CM

## 2023-03-02 PROCEDURE — 99024 POSTOP FOLLOW-UP VISIT: CPT | Performed by: OPHTHALMOLOGY

## 2023-03-02 ASSESSMENT — REFRACTION_CURRENTRX
OS_AXIS: 94
OS_VPRISM_DIRECTION: SV
OD_AXIS: 89
OS_SPHERE: -1.75
OS_AXIS: 0
OS_OVR_VA: 20/
OD_CYLINDER: -0.50
OS_SPHERE: +1.00
OS_CYLINDER: -0.50
OD_SPHERE: -1.50
OD_OVR_VA: 20/
OD_SPHERE: +1.50
OS_VPRISM_DIRECTION: SV
OD_VPRISM_DIRECTION: SV
OD_OVR_VA: 20/
OD_VPRISM_DIRECTION: SV
OS_CYLINDER: SPH
OD_AXIS: 0
OS_OVR_VA: 20/
OD_CYLINDER: SPH

## 2023-03-02 ASSESSMENT — AXIALLENGTH_DERIVED
OS_AL: 23.5572
OS_AL: 24.1547
OD_AL: 24.774
OS_AL: 24.3606
OD_AL: 23.6463
OD_AL: 24.2999
OD_AL: 24.197

## 2023-03-02 ASSESSMENT — KERATOMETRY
OD_K2POWER_DIOPTERS: 43.75
OS_K2POWER_DIOPTERS: 44.25
OD_AXISANGLE_DEGREES: 090
OD_K1POWER_DIOPTERS: 43.75
OS_AXISANGLE_DEGREES: 108
METHOD_AUTO_MANUAL: AUTO
OS_K1POWER_DIOPTERS: 44.00

## 2023-03-02 ASSESSMENT — REFRACTION_MANIFEST
OS_ADD: +2.50
OS_AXIS: 090
OD_SPHERE: -1.50
OU_VA: 20/40
OD_CYLINDER: -0.50
OD_VA1: 20/40-
OS_CYLINDER: 0.00
OS_SPHERE: -2.00
OS_SPHERE: -1.75
OS_AXIS: 000
OS_VA1: 20/40
OD_CYLINDER: -0.75
OD_ADD: +2.50
OS_VA1: 20/30
OD_AXIS: 086
OD_VA1: 20/30-1
OS_SPHERE: -2.50
OS_CYLINDER: -0.50
OD_SPHERE: -2.75
OD_VA1: 20/30+2
OD_AXIS: 90
OS_VA1: 20/30+
OS_CYLINDER: -SPH
OD_CYLINDER: -0.50
OD_SPHERE: -1.75
OD_AXIS: 090

## 2023-03-02 ASSESSMENT — REFRACTION_AUTOREFRACTION
OD_AXIS: 074
OS_CYLINDER: 0.00
OS_SPHERE: -0.50
OD_SPHERE: -0.25
OS_AXIS: 000
OD_CYLINDER: -0.25

## 2023-03-02 ASSESSMENT — TONOMETRY: OS_IOP_MMHG: 19

## 2023-03-02 ASSESSMENT — SPHEQUIV_DERIVED
OD_SPHEQUIV: -0.375
OS_SPHEQUIV: -0.5
OS_SPHEQUIV: -2
OS_SPHEQUIV: -2.5
OD_SPHEQUIV: -1.75
OD_SPHEQUIV: -3.125
OD_SPHEQUIV: -2

## 2023-03-02 ASSESSMENT — CONFRONTATIONAL VISUAL FIELD TEST (CVF)
OD_FINDINGS: FULL
OS_FINDINGS: FULL

## 2023-03-02 ASSESSMENT — PACHYMETRY
OS_CT_CORRECTION: -3
OD_CT_UM: 581
OS_CT_UM: 580
OD_CT_CORRECTION: -3

## 2023-03-02 ASSESSMENT — VISUAL ACUITY
OS_BCVA: 20/25
OD_BCVA: 20/25-2

## 2023-03-02 ASSESSMENT — CORNEAL EDEMA CLINICAL DESCRIPTION: OS_CORNEALEDEMA: T

## 2023-03-09 ENCOUNTER — RX ONLY (RX ONLY)
Age: 68
End: 2023-03-09

## 2023-03-09 ENCOUNTER — OFFICE (OUTPATIENT)
Dept: URBAN - METROPOLITAN AREA CLINIC 102 | Facility: CLINIC | Age: 68
Setting detail: OPHTHALMOLOGY
End: 2023-03-09
Payer: MEDICARE

## 2023-03-09 DIAGNOSIS — Z96.1: ICD-10-CM

## 2023-03-09 PROCEDURE — 99024 POSTOP FOLLOW-UP VISIT: CPT | Performed by: OPHTHALMOLOGY

## 2023-03-09 ASSESSMENT — REFRACTION_CURRENTRX
OD_OVR_VA: 20/
OS_AXIS: 94
OS_SPHERE: +1.00
OS_VPRISM_DIRECTION: SV
OS_AXIS: 0
OD_SPHERE: -1.50
OD_OVR_VA: 20/
OD_AXIS: 0
OS_OVR_VA: 20/
OS_CYLINDER: SPH
OD_SPHERE: +1.50
OS_VPRISM_DIRECTION: SV
OD_AXIS: 89
OD_VPRISM_DIRECTION: SV
OD_CYLINDER: -0.50
OD_VPRISM_DIRECTION: SV
OS_CYLINDER: -0.50
OD_CYLINDER: SPH
OS_SPHERE: -1.75
OS_OVR_VA: 20/

## 2023-03-09 ASSESSMENT — REFRACTION_MANIFEST
OS_VA1: 20/30+
OS_SPHERE: -2.50
OS_AXIS: 090
OD_AXIS: 090
OD_ADD: +2.50
OS_VA1: 20/30
OD_VA1: 20/40-
OS_SPHERE: -1.75
OD_SPHERE: -1.50
OS_SPHERE: -2.00
OD_SPHERE: -1.75
OU_VA: 20/40
OS_VA1: 20/40
OD_SPHERE: -2.75
OD_CYLINDER: -0.75
OS_CYLINDER: 0.00
OD_AXIS: 90
OS_AXIS: 000
OS_ADD: +2.50
OD_CYLINDER: -0.50
OD_AXIS: 086
OD_VA1: 20/30+2
OS_CYLINDER: -SPH
OD_CYLINDER: -0.50
OS_CYLINDER: -0.50
OD_VA1: 20/30-1

## 2023-03-09 ASSESSMENT — PACHYMETRY
OS_CT_UM: 580
OD_CT_CORRECTION: -3
OD_CT_UM: 581
OS_CT_CORRECTION: -3

## 2023-03-09 ASSESSMENT — AXIALLENGTH_DERIVED
OS_AL: 23.6058
OD_AL: 24.053
OD_AL: 24.1547
OD_AL: 24.623
OS_AL: 24.3606
OD_AL: 23.5087
OS_AL: 24.1547

## 2023-03-09 ASSESSMENT — REFRACTION_AUTOREFRACTION
OS_AXIS: 018
OD_SPHERE: -0.25
OD_AXIS: 109
OD_CYLINDER: -0.25
OS_CYLINDER: -0.25
OS_SPHERE: -0.50

## 2023-03-09 ASSESSMENT — KERATOMETRY
OD_AXISANGLE_DEGREES: 090
OS_K2POWER_DIOPTERS: 44.25
OD_K2POWER_DIOPTERS: 44.25
OS_K1POWER_DIOPTERS: 44.00
OS_AXISANGLE_DEGREES: 103
OD_K1POWER_DIOPTERS: 44.00
METHOD_AUTO_MANUAL: AUTO

## 2023-03-09 ASSESSMENT — CONFRONTATIONAL VISUAL FIELD TEST (CVF)
OD_FINDINGS: FULL
OS_FINDINGS: FULL

## 2023-03-09 ASSESSMENT — VISUAL ACUITY
OD_BCVA: 20/30-2
OS_BCVA: 20/25-2

## 2023-03-09 ASSESSMENT — SPHEQUIV_DERIVED
OD_SPHEQUIV: -0.375
OS_SPHEQUIV: -2.5
OD_SPHEQUIV: -2
OD_SPHEQUIV: -1.75
OD_SPHEQUIV: -3.125
OS_SPHEQUIV: -2
OS_SPHEQUIV: -0.625

## 2023-03-09 ASSESSMENT — TONOMETRY
OS_IOP_MMHG: 16
OD_IOP_MMHG: 17

## 2023-04-07 ENCOUNTER — OFFICE (OUTPATIENT)
Dept: URBAN - METROPOLITAN AREA CLINIC 102 | Facility: CLINIC | Age: 68
Setting detail: OPHTHALMOLOGY
End: 2023-04-07
Payer: MEDICARE

## 2023-04-07 DIAGNOSIS — H01.001: ICD-10-CM

## 2023-04-07 DIAGNOSIS — H01.005: ICD-10-CM

## 2023-04-07 DIAGNOSIS — H01.004: ICD-10-CM

## 2023-04-07 DIAGNOSIS — H10.33: ICD-10-CM

## 2023-04-07 DIAGNOSIS — H01.002: ICD-10-CM

## 2023-04-07 PROBLEM — Z96.1 PSEUDOPHAKIA-1 WEEK P/O CAT WITH IOL ; LEFT EYE: Status: ACTIVE | Noted: 2023-03-02

## 2023-04-07 PROCEDURE — 99213 OFFICE O/P EST LOW 20 MIN: CPT | Performed by: OPHTHALMOLOGY

## 2023-04-07 ASSESSMENT — AXIALLENGTH_DERIVED
OD_AL: 24.6731
OD_AL: 24.1008
OS_AL: 24.1066
OD_AL: 24.2029
OS_AL: 23.56
OS_AL: 24.3117
OD_AL: 23.4577

## 2023-04-07 ASSESSMENT — CONFRONTATIONAL VISUAL FIELD TEST (CVF)
OD_FINDINGS: FULL
OS_FINDINGS: FULL

## 2023-04-07 ASSESSMENT — LID EXAM ASSESSMENTS
OS_BLEPHARITIS: LLL LUL 1+
OD_BLEPHARITIS: RLL RUL 3+

## 2023-04-07 ASSESSMENT — VISUAL ACUITY
OD_BCVA: 20/25
OS_BCVA: 20/20

## 2023-04-07 ASSESSMENT — KERATOMETRY
METHOD_AUTO_MANUAL: AUTO
OD_K1POWER_DIOPTERS: 44.00
OS_AXISANGLE_DEGREES: 98
OD_AXISANGLE_DEGREES: 90
OS_K2POWER_DIOPTERS: 44.50
OS_K1POWER_DIOPTERS: 44.00
OD_K2POWER_DIOPTERS: 44.00

## 2023-04-07 ASSESSMENT — REFRACTION_MANIFEST
OS_ADD: +2.50
OS_SPHERE: -2.00
OD_AXIS: 086
OS_AXIS: 090
OD_CYLINDER: -0.50
OD_VA1: 20/30+2
OS_SPHERE: -1.75
OS_AXIS: 000
OD_SPHERE: -1.75
OS_VA1: 20/30+
OD_SPHERE: -2.75
OS_CYLINDER: -SPH
OS_SPHERE: -2.50
OD_SPHERE: -1.50
OD_ADD: +2.50
OD_VA1: 20/30-1
OS_CYLINDER: -0.50
OD_AXIS: 090
OS_VA1: 20/30
OD_AXIS: 90
OD_CYLINDER: -0.50
OS_VA1: 20/40
OS_CYLINDER: 0.00
OD_VA1: 20/40-
OD_CYLINDER: -0.75
OU_VA: 20/40

## 2023-04-07 ASSESSMENT — SPHEQUIV_DERIVED
OS_SPHEQUIV: -2.5
OD_SPHEQUIV: -0.125
OS_SPHEQUIV: -0.625
OS_SPHEQUIV: -2
OD_SPHEQUIV: -2
OD_SPHEQUIV: -1.75
OD_SPHEQUIV: -3.125

## 2023-04-07 ASSESSMENT — REFRACTION_CURRENTRX
OS_SPHERE: +1.00
OD_CYLINDER: -0.50
OS_OVR_VA: 20/
OS_AXIS: 0
OD_VPRISM_DIRECTION: SV
OS_CYLINDER: -0.50
OD_AXIS: 0
OD_OVR_VA: 20/
OD_VPRISM_DIRECTION: SV
OS_SPHERE: -1.75
OS_CYLINDER: SPH
OD_SPHERE: -1.50
OD_CYLINDER: SPH
OS_AXIS: 94
OS_VPRISM_DIRECTION: SV
OS_OVR_VA: 20/
OD_SPHERE: +1.50
OD_AXIS: 89
OD_OVR_VA: 20/
OS_VPRISM_DIRECTION: SV

## 2023-04-07 ASSESSMENT — PACHYMETRY
OD_CT_CORRECTION: -3
OS_CT_UM: 580
OD_CT_UM: 581
OS_CT_CORRECTION: -3

## 2023-04-07 ASSESSMENT — REFRACTION_AUTOREFRACTION
OD_AXIS: 136
OS_AXIS: 78
OS_SPHERE: -0.50
OD_CYLINDER: -0.25
OS_CYLINDER: -0.25
OD_SPHERE: 0.00

## 2023-04-07 ASSESSMENT — TONOMETRY
OD_IOP_MMHG: 14
OS_IOP_MMHG: 17

## 2023-04-20 ENCOUNTER — OFFICE (OUTPATIENT)
Dept: URBAN - METROPOLITAN AREA CLINIC 102 | Facility: CLINIC | Age: 68
Setting detail: OPHTHALMOLOGY
End: 2023-04-20
Payer: MEDICARE

## 2023-04-20 DIAGNOSIS — Z96.1: ICD-10-CM

## 2023-04-20 PROBLEM — H10.33 CONJUNCTIVITS ACUTE UNSPECIFIED; BOTH EYES: Status: ACTIVE | Noted: 2023-04-07

## 2023-04-20 PROBLEM — H01.001 BLEPHARITIS; RIGHT UPPER LID, RIGHT LOWER LID, LEFT UPPER LID, LEFT LOWER LID: Status: ACTIVE | Noted: 2023-04-20

## 2023-04-20 PROBLEM — H01.005 BLEPHARITIS; RIGHT UPPER LID, RIGHT LOWER LID, LEFT UPPER LID, LEFT LOWER LID: Status: ACTIVE | Noted: 2023-04-20

## 2023-04-20 PROBLEM — H01.002 BLEPHARITIS; RIGHT UPPER LID, RIGHT LOWER LID, LEFT UPPER LID, LEFT LOWER LID: Status: ACTIVE | Noted: 2023-04-20

## 2023-04-20 PROBLEM — H01.004 BLEPHARITIS; RIGHT UPPER LID, RIGHT LOWER LID, LEFT UPPER LID, LEFT LOWER LID: Status: ACTIVE | Noted: 2023-04-20

## 2023-04-20 PROCEDURE — 99024 POSTOP FOLLOW-UP VISIT: CPT | Performed by: OPHTHALMOLOGY

## 2023-04-20 ASSESSMENT — REFRACTION_MANIFEST
OD_SPHERE: -1.75
OD_VA1: 20/30+2
OS_SPHERE: -2.00
OS_VA1: 20/30+
OD_AXIS: 086
OS_ADD: +2.50
OS_CYLINDER: -0.50
OD_VA1: 20/40-
OS_CYLINDER: 0.00
OS_VA1: 20/30
OS_SPHERE: -2.50
OD_SPHERE: -2.75
OD_CYLINDER: -0.50
OD_AXIS: 090
OS_AXIS: 090
OD_SPHERE: -1.50
OS_VA1: 20/40
OS_AXIS: 000
OD_CYLINDER: -0.75
OS_CYLINDER: -SPH
OD_VA1: 20/30-1
OD_ADD: +2.50
OS_SPHERE: -1.75
OD_AXIS: 90
OD_CYLINDER: -0.50
OU_VA: 20/40

## 2023-04-20 ASSESSMENT — SPHEQUIV_DERIVED
OS_SPHEQUIV: -2
OD_SPHEQUIV: 0
OD_SPHEQUIV: -3.125
OS_SPHEQUIV: -0.625
OD_SPHEQUIV: -2
OD_SPHEQUIV: -1.75
OS_SPHEQUIV: -2.5

## 2023-04-20 ASSESSMENT — CONFRONTATIONAL VISUAL FIELD TEST (CVF)
OS_FINDINGS: FULL
OD_FINDINGS: FULL

## 2023-04-20 ASSESSMENT — REFRACTION_CURRENTRX
OS_VPRISM_DIRECTION: SV
OD_CYLINDER: SPH
OD_OVR_VA: 20/
OD_CYLINDER: -0.50
OS_CYLINDER: -0.50
OD_AXIS: 0
OD_AXIS: 89
OS_AXIS: 94
OS_SPHERE: +1.00
OS_OVR_VA: 20/
OS_SPHERE: -1.75
OS_AXIS: 0
OD_VPRISM_DIRECTION: SV
OD_SPHERE: -1.50
OS_OVR_VA: 20/
OS_VPRISM_DIRECTION: SV
OD_SPHERE: +1.50
OD_OVR_VA: 20/
OD_VPRISM_DIRECTION: SV
OS_CYLINDER: SPH

## 2023-04-20 ASSESSMENT — PACHYMETRY
OD_CT_CORRECTION: -3
OS_CT_UM: 580
OD_CT_UM: 581
OS_CT_CORRECTION: -3

## 2023-04-20 ASSESSMENT — KERATOMETRY
OD_K1POWER_DIOPTERS: 43.50
OD_K2POWER_DIOPTERS: 44.00
METHOD_AUTO_MANUAL: AUTO
OS_K1POWER_DIOPTERS: 43.75
OS_K2POWER_DIOPTERS: 44.25
OS_AXISANGLE_DEGREES: 138
OD_AXISANGLE_DEGREES: 164

## 2023-04-20 ASSESSMENT — LID EXAM ASSESSMENTS
OS_BLEPHARITIS: LLL LUL 1+
OD_BLEPHARITIS: RLL RUL 3+

## 2023-04-20 ASSESSMENT — REFRACTION_AUTOREFRACTION
OD_AXIS: 094
OS_SPHERE: -0.50
OS_CYLINDER: -0.25
OD_SPHERE: +0.50
OS_AXIS: 072
OD_CYLINDER: -1.00

## 2023-04-20 ASSESSMENT — AXIALLENGTH_DERIVED
OS_AL: 23.6519
OD_AL: 24.774
OS_AL: 24.4097
OD_AL: 23.5004
OD_AL: 24.2999
OD_AL: 24.197
OS_AL: 24.2029

## 2023-04-20 ASSESSMENT — VISUAL ACUITY
OD_BCVA: 20/20
OS_BCVA: 20/30

## 2023-04-20 ASSESSMENT — TONOMETRY
OS_IOP_MMHG: 12
OD_IOP_MMHG: 12

## 2023-05-16 NOTE — ASU PATIENT PROFILE, ADULT - VISION (WITH CORRECTIVE LENSES IF THE PATIENT USUALLY WEARS THEM):
"[facility]  Hospitalist Discharge Summary      Date of Admission:  5/9/2023  Date of Discharge:  5/16/2023  2:15 PM  Discharging Provider: Deisy Pulido MD  Discharge Service: Hospitalist Service    Discharge Diagnoses   Hyponatremia  SIADH  Hypokalemia  Diabetic polyneuropathy  Essential hypertension  Type 2 diabetes  CKD 3a  Hyperlipidemia  Back pain    Clinically Significant Risk Factors     # DMII: A1C = 7.9 % (Ref range: <5.7 %) within past 6 months  # Overweight: Estimated body mass index is 29.86 kg/m  as calculated from the following:    Height as of this encounter: 1.651 m (5' 5\").    Weight as of this encounter: 81.4 kg (179 lb 7.3 oz).       Follow-ups Needed After Discharge   Follow-up Appointments     Follow Up and recommended labs and tests      Follow up with custodial physician.  The following labs/tests are   recommended: basic metabolic profile in 3 days.  Follow up with nephrology in 2-4 weeks.         Discharge Disposition   Discharged to short-term care facility  Condition at discharge: Good    Hospital Course   Vashti King is a 75 year old male admitted on 5/9/2023. He has a history of hypertension, diabetes, BPH, CKD presented with nausea and vomiting.  Patient was seen a couple days ago in the ED for burning sensation of the hands and feet and he was started on gabapentin on 5/7/2023.  He stated that he started to have this nausea vomiting after starting the medication.  In the ED, sodium was 120 down from 136 three days prior.     Acute hyponatremia  -In the setting of hydrochlorothiazide use and nausea and vomiting. Gabapentin could exacerbate this as well  -Severe sodium fluctuations and appreciate nephrology management  -Presented with sodium of 116, dropped as low as 111, transferred to ICU on 5/10 and on hypertonic saline per nephrology management, transferred to floor on 5/13.  Never had any mental status changes  -Currently on sodium tabs, fluid " restriction, and lasix. Maintaining sodium well with this, continue current regimen except we will relax his fluid restriction a little bit.  Recheck basic metabolic profile in 3 days.  -Follow-up nephrology in the next few weeks, they may be able to stop sodium tabs and/or relax fluid restriction further  -Permanently stop HCTZ and I added this to his allergy list  -Reduced dose of gabapentin     Back pain   -Uncontrolled so started on tylenol TID and lidocaine patches   -Tramadol twice daily PRN for severe pain, expect he should not need this much longer as he continues to rehabilitate  -TCU for ongoing rehabilitation     Essential hypertension  -HCTZ permanently stopped due to hyponatremia  -Continue with lisinopril 40 mg , diltiazem 240mg  -Metoprolol transitioned to carvedilol 25 BID-->50 BID  -Outpatient follow-up     Diabetes mellitus II  -Hemoglobin A1c 7.9 on his home regimen of glipizide, Invokana, metformin.  During hospital stay he was on insulin however for discharge we will resume his home regimen since he was well controlled     Polyneuropathy-likely secondary to diabetes mellitus  -Gabapentin dose decreased to 200 mg nightly, concerns for sedation as well as could contribute to hyponatremia    Hypokalemia  -Start KCl, check BMP in 3 days       Consultations This Hospital Stay   NEPHROLOGY IP CONSULT  VASCULAR ACCESS ADULT IP CONSULT  PHYSICAL THERAPY ADULT IP CONSULT  OCCUPATIONAL THERAPY ADULT IP CONSULT  PHYSICAL THERAPY ADULT IP CONSULT  OCCUPATIONAL THERAPY ADULT IP CONSULT    Code Status   Full Code    Time Spent on this Encounter   I, Deisy Pulido MD, personally saw the patient today and spent greater than 30 minutes discharging this patient.       Deisy Pulido MD  [address] [address]  Phone: [phone]  Fax: [phone]  ______________________________________________________________________    Physical Exam   Vital Signs:  Temp: 98.2  F (36.8  C) Temp src: Axillary BP: (!) 154/78 Pulse: 56   Resp: 16 SpO2: 96 % O2 Device: None (Room air)    Weight: 179 lbs 7.27 oz  General: in no apparent distress, non-toxic and alert male sitting in bedside chair oriented x3  HEENT: Head normocephalic atraumatic, oral mucosa moist. Sclerae anicteric  CV: Regular rhythm, normal rate, no murmurs  Resp: No wheezes, no rales or rhonchi, no focal consolidations  GI: Belly soft, nondistended, nontender, bowel sounds present  Skin: No rashes or lesions  Extremities: No peripheral edema  Psych: Normal affect, mood euthymic  Neuro: CNII-XII grossly intact, moving all 4 extremities         Primary Care Physician   Edison Banda    Discharge Orders      Primary Care - Care Coordination Referral      General info for SNF    Length of Stay Estimate: Short Term Care: Estimated # of Days <30  Condition at Discharge: Improving  Level of care:skilled   Rehabilitation Potential: Excellent  Admission H&P remains valid and up-to-date: Yes  Recent Chemotherapy: N/A  Use Nursing Home Standing Orders: Yes  Free of communicable diseases: Yes     Mantoux instructions    Give two-step Mantoux (PPD) Per Facility Policy Yes     Follow Up and recommended labs and tests    Follow up with long term physician.  The following labs/tests are recommended: basic metabolic profile in 3 days.  Follow up with nephrology in 2-4 weeks.     Activity - Up ad elena     Weight bearing status    WBAT     Reason for your hospital stay    Low sodium level     Glucose monitor nursing POCT    Before meals and at bedtime     Intake and output    Every shift     Follow Up (TCM)    Follow up with [facility], at [address] [place] [address], [address], [facility], [address], within 1-2 weeks   for hospital follow- up. The following labs/tests are recommended: BMP.     Full Code     Physical Therapy Adult Consult    Evaluate and treat as clinically indicated.    Reason:  Weakness, deconditioning      Occupational Therapy Adult Consult    Evaluate and treat as clinically indicated.    Reason:  Weakness, deconditioning     Diet    Follow this diet upon discharge: Diabetic diet.    Fluid restriction 1800 ML FLUID (avoid anusha ice and water)       Significant Results and Procedures   Most Recent 3 BMP's:Recent Labs   Lab Test 05/16/23  1159 05/16/23  0738 05/16/23  0644 05/15/23  0738 05/15/23  0513 05/14/23  2139 05/14/23  1753 05/12/23  0746 05/12/23  0441   NA  --   --  132*  --  129*  129*  --  125*   < > 123*  123*   POTASSIUM  --   --  3.3*  --  3.2*  --   --   --  3.8   CHLORIDE  --   --  96*  --  95*  --   --   --  91*   CO2  --   --  28  --  27  --   --   --  20*   BUN  --   --  16.8  --  17.8  --   --   --  15.8   CR  --   --  0.69  --  0.77  --   --   --  0.55*   ANIONGAP  --   --  8  --  7  --   --   --  12   HELEN  --   --  8.5*  --  8.4*  --   --   --  8.2*   * 94 85   < > 124*   < >  --    < > 137*    < > = values in this interval not displayed.       Discharge Medications   Discharge Medication List as of 5/16/2023  1:14 PM      START taking these medications    Details   acetaminophen (TYLENOL) 325 MG tablet Take 3 tablets (975 mg) by mouth 3 times daily, Disp-180 tablet, R-0, Local Print      carvedilol (COREG) 25 MG tablet Take 2 tablets (50 mg) by mouth 2 times daily (with meals), Disp-60 tablet, R-0, Local Print      furosemide (LASIX) 20 MG tablet Take 1 tablet (20 mg) by mouth 2 times daily, Disp-60 tablet, R-0, Local Print      Lidocaine (LIDOCARE) 4 % Patch   Place 1 patch onto the skin every 24 hours To prevent lidocaine toxicity, patient should be patch free for 12 hrs daily. Apply patch(s) to back. Patches may be cut to smaller size prior to removing release liner.  Reminder: Remove previous patch before  applying new patch.  NEVER APPLY HEAT OVER PATCH which increases absorption and may lead to local anesthetic toxicity. Do not apply over area where liposomal bupivacaine was  injected for 96 hours post injection.Disp-10 patch, R-0Local Print       melatonin 1 MG TABS tablet Take 1 tablet (1 mg) by mouth At Bedtime, Disp-30 tablet, R-0, Local Print      polyethylene glycol (MIRALAX) 17 GM/Dose powder Take 17 g by mouth daily, Disp-510 g, R-0, Local Print      potassium chloride ER (K-TAB) 20 MEQ CR tablet Take 1 tablet (20 mEq) by mouth daily, Disp-30 tablet, R-0, Local Print      senna-docusate (SENOKOT-S/PERICOLACE) 8.6-50 MG tablet Take 1 tablet by mouth 2 times daily, Disp-60 tablet, R-0, Local Print      sodium chloride 1 GM tablet Take 2 tablets (2 g) by mouth 2 times daily (with meals), Disp-60 tablet, R-0, Local Print      traMADol (ULTRAM) 50 MG tablet Take 1 tablet (50 mg) by mouth 2 times daily as needed for severe pain, Disp-6 tablet, R-0, Local Print         CONTINUE these medications which have CHANGED    Details   gabapentin (NEURONTIN) 100 MG capsule Take 2 capsules (200 mg) by mouth At Bedtime, Disp-30 capsule, R-0, Local Print         CONTINUE these medications which have NOT CHANGED    Details   aspirin (ASA) 81 MG EC tablet Take 1 tablet (81 mg) by mouth daily, Disp-90 tablet, R-3, E-Prescribe      atorvastatin (LIPITOR) 40 MG tablet TAKE 1 TABLET BY MOUTH EVERY DAY, Disp-90 tablet, R-2, E-Prescribe      blood glucose (CONTOUR TEST) test strip Use to test blood sugar once daily or as directed., Disp-100 strip, R-3, E-Prescribe      diltiazem ER COATED BEADS (CARDIZEM CD/CARTIA XT) 240 MG 24 hr capsule TAKE 1 CAPSULE BY MOUTH EVERY DAY, Disp-90 capsule, R-1, E-Prescribe      generic lancets (FINGERSTIX LANCETS) [GENERIC LANCETS (FINGERSTIX LANCETS)] Check blood sugar twice daily.  Uses Insulin.  Diagnosis E11.9, Disp-100 each, R-1, Normal      glipiZIDE (GLUCOTROL) 5 MG tablet TAKE 2 TABLETS BY MOUTH TWICE A DAY WITH MEALS, Disp-360 tablet, R-1, E-Prescribe      INVOKANA 100 MG tablet TAKE 1 TABLET (100 MG) BY MOUTH EVERY MORNING (BEFORE BREAKFAST), Disp-90 tablet, R-0,  "E-Prescribe      lisinopril (ZESTRIL) 40 MG tablet TAKE 1 TABLET BY MOUTH EVERY DAY, Disp-90 tablet, R-2, E-Prescribe      metFORMIN (GLUCOPHAGE XR) 500 MG 24 hr tablet TAKE 2 TABLETS BY MOUTH TWICE A DAY WITH FOOD, Disp-360 tablet, R-3, E-Prescribe      Microlet Lancets MISC 100 each daily Use to test blood sugar once daily., Disp-100 each, R-4, RACHEL, E-Prescribe      pen needle, diabetic 32 gauge x 1/4\" Ndle [PEN NEEDLE, DIABETIC 32 GAUGE X 1/4\" NDLE] Use as needed with insulinDisp-100 each, R-1Normal         STOP taking these medications       hydrochlorothiazide (HYDRODIURIL) 12.5 MG tablet Comments:   Reason for Stopping:         metoprolol succinate ER (TOPROL XL) 50 MG 24 hr tablet Comments:   Reason for Stopping:             " Normal vision: sees adequately in most situations; can see medication labels, newsprint

## 2023-05-19 ENCOUNTER — OFFICE (OUTPATIENT)
Dept: URBAN - METROPOLITAN AREA CLINIC 102 | Facility: CLINIC | Age: 68
Setting detail: OPHTHALMOLOGY
End: 2023-05-19
Payer: MEDICARE

## 2023-05-19 DIAGNOSIS — H10.503: ICD-10-CM

## 2023-05-19 PROCEDURE — 99213 OFFICE O/P EST LOW 20 MIN: CPT | Performed by: OPHTHALMOLOGY

## 2023-05-19 ASSESSMENT — KERATOMETRY
OS_K1POWER_DIOPTERS: 44.00
OD_AXISANGLE_DEGREES: 089
OD_K1POWER_DIOPTERS: 44.00
OS_K2POWER_DIOPTERS: 44.50
OD_K2POWER_DIOPTERS: 44.75
OS_AXISANGLE_DEGREES: 110
METHOD_AUTO_MANUAL: AUTO

## 2023-05-19 ASSESSMENT — REFRACTION_MANIFEST
OD_CYLINDER: -0.75
OS_VA1: 20/30+
OU_VA: 20/40
OD_AXIS: 086
OD_VA1: 20/30+2
OS_AXIS: 000
OS_SPHERE: -2.50
OS_ADD: +2.50
OS_VA1: 20/40
OD_SPHERE: -1.75
OD_ADD: +2.50
OS_CYLINDER: 0.00
OD_SPHERE: -2.75
OD_SPHERE: -1.50
OD_AXIS: 90
OS_VA1: 20/30
OS_SPHERE: -1.75
OD_CYLINDER: -0.50
OD_AXIS: 090
OS_CYLINDER: -SPH
OD_CYLINDER: -0.50
OD_VA1: 20/30-1
OD_VA1: 20/40-
OS_SPHERE: -2.00
OS_CYLINDER: -0.50
OS_AXIS: 090

## 2023-05-19 ASSESSMENT — AXIALLENGTH_DERIVED
OS_AL: 24.1066
OS_AL: 24.3117
OD_AL: 24.5234
OS_AL: 23.6575
OD_AL: 23.9579
OD_AL: 24.0588
OD_AL: 23.5143

## 2023-05-19 ASSESSMENT — REFRACTION_CURRENTRX
OD_CYLINDER: SPH
OS_CYLINDER: SPH
OD_VPRISM_DIRECTION: SV
OS_OVR_VA: 20/
OD_SPHERE: +1.50
OS_SPHERE: +1.00
OS_CYLINDER: -0.50
OD_OVR_VA: 20/
OD_AXIS: 89
OS_VPRISM_DIRECTION: SV
OS_OVR_VA: 20/
OD_SPHERE: -1.50
OS_AXIS: 0
OD_OVR_VA: 20/
OD_VPRISM_DIRECTION: SV
OS_AXIS: 94
OD_AXIS: 0
OS_VPRISM_DIRECTION: SV
OD_CYLINDER: -0.50
OS_SPHERE: -1.75

## 2023-05-19 ASSESSMENT — SPHEQUIV_DERIVED
OS_SPHEQUIV: -2
OD_SPHEQUIV: -1.75
OD_SPHEQUIV: -3.125
OD_SPHEQUIV: -2
OD_SPHEQUIV: -0.625
OS_SPHEQUIV: -0.875
OS_SPHEQUIV: -2.5

## 2023-05-19 ASSESSMENT — CONFRONTATIONAL VISUAL FIELD TEST (CVF)
OS_FINDINGS: FULL
OD_FINDINGS: FULL

## 2023-05-19 ASSESSMENT — PACHYMETRY
OD_CT_CORRECTION: -3
OS_CT_CORRECTION: -3
OS_CT_UM: 580
OD_CT_UM: 581

## 2023-05-19 ASSESSMENT — LID EXAM ASSESSMENTS
OS_BLEPHARITIS: LLL LUL 2+
OD_BLEPHARITIS: RLL RUL 3+

## 2023-05-19 ASSESSMENT — VISUAL ACUITY
OS_BCVA: 20/30
OD_BCVA: 20/20-1

## 2023-05-19 ASSESSMENT — REFRACTION_AUTOREFRACTION
OD_AXIS: 150
OS_AXIS: 015
OD_CYLINDER: -0.25
OS_CYLINDER: -0.25
OD_SPHERE: -0.50
OS_SPHERE: -0.75

## 2023-05-19 ASSESSMENT — TONOMETRY
OD_IOP_MMHG: 15
OS_IOP_MMHG: 17

## 2023-07-24 ENCOUNTER — OFFICE (OUTPATIENT)
Dept: URBAN - METROPOLITAN AREA CLINIC 102 | Facility: CLINIC | Age: 68
Setting detail: OPHTHALMOLOGY
End: 2023-07-24
Payer: MEDICARE

## 2023-07-24 DIAGNOSIS — H40.013: ICD-10-CM

## 2023-07-24 DIAGNOSIS — H01.004: ICD-10-CM

## 2023-07-24 DIAGNOSIS — H01.005: ICD-10-CM

## 2023-07-24 DIAGNOSIS — Z96.1: ICD-10-CM

## 2023-07-24 DIAGNOSIS — E11.9: ICD-10-CM

## 2023-07-24 DIAGNOSIS — H43.393: ICD-10-CM

## 2023-07-24 DIAGNOSIS — H01.002: ICD-10-CM

## 2023-07-24 DIAGNOSIS — H01.001: ICD-10-CM

## 2023-07-24 PROCEDURE — 92083 EXTENDED VISUAL FIELD XM: CPT | Performed by: OPHTHALMOLOGY

## 2023-07-24 PROCEDURE — 92014 COMPRE OPH EXAM EST PT 1/>: CPT | Performed by: OPHTHALMOLOGY

## 2023-07-24 PROCEDURE — 92250 FUNDUS PHOTOGRAPHY W/I&R: CPT | Performed by: OPHTHALMOLOGY

## 2023-07-24 ASSESSMENT — REFRACTION_AUTOREFRACTION
OD_SPHERE: -0.25
OS_SPHERE: -1.00
OS_AXIS: 000
OD_AXIS: 100
OD_CYLINDER: -0.25
OS_CYLINDER: 0.00

## 2023-07-24 ASSESSMENT — AXIALLENGTH_DERIVED
OD_AL: 24.2029
OS_AL: 24.3117
OD_AL: 24.1008
OD_AL: 23.5544
OS_AL: 23.7066
OD_AL: 24.6731
OS_AL: 24.1066

## 2023-07-24 ASSESSMENT — REFRACTION_MANIFEST
OD_CYLINDER: -0.50
OD_VA1: 20/40-
OS_AXIS: 000
OD_ADD: +2.50
OS_ADD: +2.50
OS_SPHERE: -2.50
OS_VA1: 20/40
OD_VA1: 20/30-1
OS_SPHERE: -1.75
OD_SPHERE: -1.75
OS_AXIS: 090
OS_SPHERE: -2.00
OD_AXIS: 90
OU_VA: 20/40
OD_CYLINDER: -0.75
OS_VA1: 20/30+
OS_CYLINDER: 0.00
OS_VA1: 20/30
OD_VA1: 20/30+2
OD_SPHERE: -2.75
OD_CYLINDER: -0.50
OS_CYLINDER: -0.50
OS_CYLINDER: -SPH
OD_AXIS: 086
OD_AXIS: 090
OD_SPHERE: -1.50

## 2023-07-24 ASSESSMENT — REFRACTION_CURRENTRX
OD_SPHERE: +1.50
OS_CYLINDER: SPH
OD_SPHERE: -1.50
OD_OVR_VA: 20/
OD_OVR_VA: 20/
OS_OVR_VA: 20/
OD_AXIS: 0
OS_SPHERE: -1.75
OS_AXIS: 0
OD_VPRISM_DIRECTION: SV
OS_CYLINDER: -0.50
OD_CYLINDER: -0.50
OS_OVR_VA: 20/
OS_AXIS: 94
OS_VPRISM_DIRECTION: SV
OS_SPHERE: +1.00
OD_VPRISM_DIRECTION: SV
OS_VPRISM_DIRECTION: SV
OD_AXIS: 89
OD_CYLINDER: SPH

## 2023-07-24 ASSESSMENT — KERATOMETRY
OS_K1POWER_DIOPTERS: 44.25
OD_K1POWER_DIOPTERS: 44.00
METHOD_AUTO_MANUAL: AUTO
OS_K2POWER_DIOPTERS: 44.25
OS_AXISANGLE_DEGREES: 090
OD_AXISANGLE_DEGREES: 090
OD_K2POWER_DIOPTERS: 44.00

## 2023-07-24 ASSESSMENT — CONFRONTATIONAL VISUAL FIELD TEST (CVF)
OD_FINDINGS: FULL
OS_FINDINGS: FULL

## 2023-07-24 ASSESSMENT — SPHEQUIV_DERIVED
OD_SPHEQUIV: -1.75
OS_SPHEQUIV: -1
OD_SPHEQUIV: -0.375
OS_SPHEQUIV: -2
OD_SPHEQUIV: -2
OD_SPHEQUIV: -3.125
OS_SPHEQUIV: -2.5

## 2023-07-24 ASSESSMENT — VISUAL ACUITY
OD_BCVA: 20/30-2
OS_BCVA: 20/30

## 2023-07-24 ASSESSMENT — TONOMETRY
OS_IOP_MMHG: 13
OD_IOP_MMHG: 15
OD_IOP_MMHG: 18
OS_IOP_MMHG: 18

## 2023-07-24 ASSESSMENT — LID EXAM ASSESSMENTS
OD_BLEPHARITIS: RLL RUL T
OS_BLEPHARITIS: LLL LUL T

## 2023-07-24 ASSESSMENT — PACHYMETRY
OS_CT_UM: 580
OD_CT_UM: 581
OD_CT_CORRECTION: -3
OS_CT_CORRECTION: -3

## 2023-07-26 NOTE — ASU PATIENT PROFILE, ADULT - CLICK TO LAUNCH ORM
. May also call Recovery Room (PACU) 24/7 @ [phone]/[facility]

## 2023-08-07 ENCOUNTER — APPOINTMENT (OUTPATIENT)
Dept: CARDIOLOGY | Facility: CLINIC | Age: 68
End: 2023-08-07
Payer: MEDICARE

## 2023-08-07 ENCOUNTER — NON-APPOINTMENT (OUTPATIENT)
Age: 68
End: 2023-08-07

## 2023-08-07 VITALS
OXYGEN SATURATION: 98 % | HEART RATE: 69 BPM | HEIGHT: 70 IN | WEIGHT: 290 LBS | DIASTOLIC BLOOD PRESSURE: 74 MMHG | SYSTOLIC BLOOD PRESSURE: 140 MMHG | BODY MASS INDEX: 41.52 KG/M2

## 2023-08-07 DIAGNOSIS — R94.31 ABNORMAL ELECTROCARDIOGRAM [ECG] [EKG]: ICD-10-CM

## 2023-08-07 DIAGNOSIS — R06.09 OTHER FORMS OF DYSPNEA: ICD-10-CM

## 2023-08-07 PROCEDURE — 99214 OFFICE O/P EST MOD 30 MIN: CPT

## 2023-08-07 PROCEDURE — 93000 ELECTROCARDIOGRAM COMPLETE: CPT

## 2023-08-07 RX ORDER — ASPIRIN 325 MG/1
325 TABLET, FILM COATED ORAL DAILY
Refills: 0 | Status: DISCONTINUED | COMMUNITY
End: 2023-08-07

## 2023-08-07 RX ORDER — ALLOPURINOL 300 MG/1
300 TABLET ORAL
Refills: 0 | Status: DISCONTINUED | COMMUNITY
Start: 2022-08-05 | End: 2023-08-07

## 2023-08-07 RX ORDER — GABAPENTIN 600 MG/1
600 TABLET, COATED ORAL DAILY
Refills: 0 | Status: DISCONTINUED | COMMUNITY
End: 2023-08-07

## 2023-08-07 NOTE — DISCUSSION/SUMMARY
[FreeTextEntry1] : Patient with known CAD s/p RCA stent in 2008.  Hx of PE on Xarelto.   Nl nuclear stress last year.  New RBBB and MOTT noted recently.  Coronary CTA ordered.  LDL in good range continue Vytorin.   [EKG obtained to assist in diagnosis and management of assessed problem(s)] : EKG obtained to assist in diagnosis and management of assessed problem(s)

## 2023-08-07 NOTE — HISTORY OF PRESENT ILLNESS
[FreeTextEntry1] : Patient with CAD s/p stent in 2008.  Nl nuclear stress in 2022.  Recent mild MOTT and new RBBB on EKG.  He has no chest discomfort.  LDL 69.6 HDL 40.7

## 2023-08-24 ENCOUNTER — APPOINTMENT (OUTPATIENT)
Dept: CT IMAGING | Facility: CLINIC | Age: 68
End: 2023-08-24
Payer: MEDICARE

## 2023-08-24 ENCOUNTER — OUTPATIENT (OUTPATIENT)
Dept: OUTPATIENT SERVICES | Facility: HOSPITAL | Age: 68
LOS: 1 days | End: 2023-08-24
Payer: MEDICARE

## 2023-08-24 DIAGNOSIS — Z98.890 OTHER SPECIFIED POSTPROCEDURAL STATES: Chronic | ICD-10-CM

## 2023-08-24 DIAGNOSIS — I25.10 ATHEROSCLEROTIC HEART DISEASE OF NATIVE CORONARY ARTERY WITHOUT ANGINA PECTORIS: ICD-10-CM

## 2023-08-24 DIAGNOSIS — R06.09 OTHER FORMS OF DYSPNEA: ICD-10-CM

## 2023-08-24 DIAGNOSIS — Z95.828 PRESENCE OF OTHER VASCULAR IMPLANTS AND GRAFTS: Chronic | ICD-10-CM

## 2023-08-24 DIAGNOSIS — Z90.49 ACQUIRED ABSENCE OF OTHER SPECIFIED PARTS OF DIGESTIVE TRACT: Chronic | ICD-10-CM

## 2023-08-24 DIAGNOSIS — G56.01 CARPAL TUNNEL SYNDROME, RIGHT UPPER LIMB: Chronic | ICD-10-CM

## 2023-08-24 DIAGNOSIS — R94.31 ABNORMAL ELECTROCARDIOGRAM [ECG] [EKG]: ICD-10-CM

## 2023-08-24 PROCEDURE — 75574 CT ANGIO HRT W/3D IMAGE: CPT | Mod: 26,MH

## 2023-08-24 PROCEDURE — 75574 CT ANGIO HRT W/3D IMAGE: CPT

## 2023-08-25 ENCOUNTER — NON-APPOINTMENT (OUTPATIENT)
Age: 68
End: 2023-08-25

## 2023-09-07 ENCOUNTER — NON-APPOINTMENT (OUTPATIENT)
Age: 68
End: 2023-09-07

## 2023-09-12 ENCOUNTER — OUTPATIENT (OUTPATIENT)
Dept: OUTPATIENT SERVICES | Facility: HOSPITAL | Age: 68
LOS: 1 days | End: 2023-09-12
Payer: MEDICARE

## 2023-09-12 DIAGNOSIS — R94.31 ABNORMAL ELECTROCARDIOGRAM [ECG] [EKG]: ICD-10-CM

## 2023-09-12 DIAGNOSIS — G56.01 CARPAL TUNNEL SYNDROME, RIGHT UPPER LIMB: Chronic | ICD-10-CM

## 2023-09-12 DIAGNOSIS — I25.10 ATHEROSCLEROTIC HEART DISEASE OF NATIVE CORONARY ARTERY WITHOUT ANGINA PECTORIS: ICD-10-CM

## 2023-09-12 DIAGNOSIS — Z98.890 OTHER SPECIFIED POSTPROCEDURAL STATES: Chronic | ICD-10-CM

## 2023-09-12 DIAGNOSIS — Z95.828 PRESENCE OF OTHER VASCULAR IMPLANTS AND GRAFTS: Chronic | ICD-10-CM

## 2023-09-12 DIAGNOSIS — Z90.49 ACQUIRED ABSENCE OF OTHER SPECIFIED PARTS OF DIGESTIVE TRACT: Chronic | ICD-10-CM

## 2023-09-12 PROCEDURE — 93017 CV STRESS TEST TRACING ONLY: CPT

## 2023-09-12 PROCEDURE — 93018 CV STRESS TEST I&R ONLY: CPT

## 2023-09-12 PROCEDURE — 93016 CV STRESS TEST SUPVJ ONLY: CPT

## 2023-09-12 NOTE — CHART NOTE - NSCHARTNOTEFT_GEN_A_CORE
STRESS TEST REPORT    CHIN ARAYA 68yM  MRN: [id number]  : 55  Admit: 23        EXERCISE NUCLEAR    The patient exercised according to the EDI Protocol.  When 85%  of maximal, age predicted heart rate, radiopharmaceutical was injected 10-20 sec after the saline flush. Patient maintain exercise post injection. The exercise test was stopped due to SOB and fatigue       Resting ECG: NSR with RBBB  Functional Capacity: normal   HR response to exercise: normal   BP response to exercise: resting HTN - appropriate response  Chest pain: none   Arrhythmias: PVCs APCS  ST changes: above 2mm ST  depression down slopping from Baseline  resolved during recovery    CONCLUSION:  Positive stress test typical of Ischemia   see myocardial perfusion scan to complete study

## 2023-09-13 ENCOUNTER — RESULT REVIEW (OUTPATIENT)
Age: 68
End: 2023-09-13

## 2023-09-13 ENCOUNTER — OUTPATIENT (OUTPATIENT)
Dept: OUTPATIENT SERVICES | Facility: HOSPITAL | Age: 68
LOS: 1 days | End: 2023-09-13

## 2023-09-13 DIAGNOSIS — R94.31 ABNORMAL ELECTROCARDIOGRAM [ECG] [EKG]: ICD-10-CM

## 2023-09-13 DIAGNOSIS — Z98.890 OTHER SPECIFIED POSTPROCEDURAL STATES: Chronic | ICD-10-CM

## 2023-09-13 DIAGNOSIS — G56.01 CARPAL TUNNEL SYNDROME, RIGHT UPPER LIMB: Chronic | ICD-10-CM

## 2023-09-13 DIAGNOSIS — I25.10 ATHEROSCLEROTIC HEART DISEASE OF NATIVE CORONARY ARTERY WITHOUT ANGINA PECTORIS: ICD-10-CM

## 2023-09-13 DIAGNOSIS — Z95.828 PRESENCE OF OTHER VASCULAR IMPLANTS AND GRAFTS: Chronic | ICD-10-CM

## 2023-09-13 DIAGNOSIS — Z90.49 ACQUIRED ABSENCE OF OTHER SPECIFIED PARTS OF DIGESTIVE TRACT: Chronic | ICD-10-CM

## 2023-09-13 PROCEDURE — 78452 HT MUSCLE IMAGE SPECT MULT: CPT | Mod: 26,MH

## 2023-09-14 ENCOUNTER — NON-APPOINTMENT (OUTPATIENT)
Age: 68
End: 2023-09-14

## 2023-11-07 ENCOUNTER — APPOINTMENT (OUTPATIENT)
Dept: VASCULAR SURGERY | Facility: CLINIC | Age: 68
End: 2023-11-07
Payer: MEDICARE

## 2023-11-07 PROCEDURE — 99204 OFFICE O/P NEW MOD 45 MIN: CPT

## 2023-12-11 ENCOUNTER — NON-APPOINTMENT (OUTPATIENT)
Age: 68
End: 2023-12-11

## 2023-12-11 ENCOUNTER — APPOINTMENT (OUTPATIENT)
Dept: CARDIOLOGY | Facility: CLINIC | Age: 68
End: 2023-12-11
Payer: MEDICARE

## 2023-12-11 VITALS
DIASTOLIC BLOOD PRESSURE: 70 MMHG | HEIGHT: 70 IN | HEART RATE: 64 BPM | OXYGEN SATURATION: 95 % | SYSTOLIC BLOOD PRESSURE: 156 MMHG | BODY MASS INDEX: 39.8 KG/M2 | WEIGHT: 278 LBS

## 2023-12-11 DIAGNOSIS — I25.10 ATHEROSCLEROTIC HEART DISEASE OF NATIVE CORONARY ARTERY W/OUT ANGINA PECTORIS: ICD-10-CM

## 2023-12-11 PROCEDURE — 93000 ELECTROCARDIOGRAM COMPLETE: CPT

## 2023-12-11 PROCEDURE — 99214 OFFICE O/P EST MOD 30 MIN: CPT

## 2023-12-11 RX ORDER — ENALAPRIL MALEATE 20 MG/1
20 TABLET ORAL
Qty: 90 | Refills: 3 | Status: ACTIVE | COMMUNITY
Start: 1900-01-01 | End: 1900-01-01

## 2024-01-25 ENCOUNTER — OFFICE (OUTPATIENT)
Dept: URBAN - METROPOLITAN AREA CLINIC 102 | Facility: CLINIC | Age: 69
Setting detail: OPHTHALMOLOGY
End: 2024-01-25
Payer: MEDICARE

## 2024-01-25 DIAGNOSIS — H01.001: ICD-10-CM

## 2024-01-25 DIAGNOSIS — Z96.1: ICD-10-CM

## 2024-01-25 DIAGNOSIS — H52.4: ICD-10-CM

## 2024-01-25 DIAGNOSIS — H01.005: ICD-10-CM

## 2024-01-25 DIAGNOSIS — H01.002: ICD-10-CM

## 2024-01-25 DIAGNOSIS — H40.013: ICD-10-CM

## 2024-01-25 DIAGNOSIS — E11.9: ICD-10-CM

## 2024-01-25 DIAGNOSIS — H43.393: ICD-10-CM

## 2024-01-25 DIAGNOSIS — H01.004: ICD-10-CM

## 2024-01-25 PROCEDURE — 92014 COMPRE OPH EXAM EST PT 1/>: CPT | Performed by: OPHTHALMOLOGY

## 2024-01-25 PROCEDURE — 92015 DETERMINE REFRACTIVE STATE: CPT | Performed by: OPHTHALMOLOGY

## 2024-01-25 ASSESSMENT — REFRACTION_MANIFEST
OS_SPHERE: -1.00
OD_CYLINDER: SPH
OD_AXIS: 90
OD_VA1: 20/40-
OD_ADD: +2.25
OD_SPHERE: -0.25
OS_VA1: 20/20
OU_VA: 20/20
OU_VA: 20/40
OS_CYLINDER: SPH
OS_ADD: +2.50
OS_ADD: +2.25
OD_SPHERE: -1.75
OD_CYLINDER: -0.50
OD_ADD: +2.50
OD_VA1: 20/20
OS_VA1: 20/30
OS_CYLINDER: -SPH
OS_SPHERE: -2.00

## 2024-01-25 ASSESSMENT — LID EXAM ASSESSMENTS
OD_BLEPHARITIS: RLL RUL T
OS_BLEPHARITIS: LLL LUL T

## 2024-01-25 ASSESSMENT — REFRACTION_CURRENTRX
OS_VPRISM_DIRECTION: SV
OD_AXIS: 89
OD_CYLINDER: -0.50
OD_CYLINDER: SPH
OS_SPHERE: +1.00
OD_AXIS: 0
OS_CYLINDER: -0.50
OS_SPHERE: -1.75
OD_SPHERE: +1.50
OS_AXIS: 0
OD_VPRISM_DIRECTION: SV
OS_OVR_VA: 20/
OS_AXIS: 94
OD_VPRISM_DIRECTION: SV
OS_VPRISM_DIRECTION: SV
OD_OVR_VA: 20/
OD_SPHERE: -1.50
OS_OVR_VA: 20/
OD_OVR_VA: 20/
OS_CYLINDER: SPH

## 2024-01-25 ASSESSMENT — CONFRONTATIONAL VISUAL FIELD TEST (CVF)
OD_FINDINGS: FULL
OS_FINDINGS: FULL

## 2024-01-25 ASSESSMENT — REFRACTION_AUTOREFRACTION
OS_AXIS: 039
OD_CYLINDER: -0.75
OD_AXIS: 094
OD_SPHERE: PLANO
OS_SPHERE: -0.75
OS_CYLINDER: -0.25

## 2024-01-25 ASSESSMENT — SPHEQUIV_DERIVED
OS_SPHEQUIV: -0.875
OD_SPHEQUIV: -2

## 2024-02-09 LAB
CHOLEST SERPL-MCNC: 190 MG/DL
HDLC SERPL-MCNC: 32 MG/DL
LDLC SERPL CALC-MCNC: 56 MG/DL
NONHDLC SERPL-MCNC: 158 MG/DL
TRIGL SERPL-MCNC: 689 MG/DL

## 2024-02-12 ENCOUNTER — APPOINTMENT (OUTPATIENT)
Dept: CARDIOLOGY | Facility: CLINIC | Age: 69
End: 2024-02-12
Payer: MEDICARE

## 2024-02-12 VITALS
DIASTOLIC BLOOD PRESSURE: 80 MMHG | HEIGHT: 70 IN | WEIGHT: 268 LBS | RESPIRATION RATE: 16 BRPM | SYSTOLIC BLOOD PRESSURE: 184 MMHG | HEART RATE: 54 BPM | OXYGEN SATURATION: 95 % | BODY MASS INDEX: 38.37 KG/M2

## 2024-02-12 DIAGNOSIS — M75.42 IMPINGEMENT SYNDROME OF LEFT SHOULDER: ICD-10-CM

## 2024-02-12 DIAGNOSIS — I10 ESSENTIAL (PRIMARY) HYPERTENSION: ICD-10-CM

## 2024-02-12 PROCEDURE — 93000 ELECTROCARDIOGRAM COMPLETE: CPT

## 2024-02-12 PROCEDURE — 99214 OFFICE O/P EST MOD 30 MIN: CPT

## 2024-02-12 PROCEDURE — G2211 COMPLEX E/M VISIT ADD ON: CPT

## 2024-02-12 NOTE — DISCUSSION/SUMMARY
[FreeTextEntry1] : HLD with increased triglyerides.  Reduction of dietary fat discussed.  HgA1c 6.8.  Will repeat lipid profile  in 3 months.  continue enalapril 20 mg for better BP control. 2 recent gout attacks.  Placed on allopurino by rheumatology. [EKG obtained to assist in diagnosis and management of assessed problem(s)] : EKG obtained to assist in diagnosis and management of assessed problem(s)

## 2024-02-16 NOTE — ED PROVIDER NOTE - PRIOR EKG STATUS
[FreeTextEntry1] : hold aspirin x 5 days  - imaging reviewed and d/w patient - biopsy is requested for diagnosis and to help guide treatment options - I reviewed image guided lung biopsy procedure, including the risks (pneumothorax, chest tube placement/hospitalization, bleeding, etc), benefits, alternatives, and expected post procedure course. - labs will done at outpatient [facility]
the EKG is unchanged from prior EKG/2008

## 2024-04-02 NOTE — ASU PREOP CHECKLIST - BMI (KG/M2)
Onset: 10-14 days  Location / description:     Patient with complaints of blood with Bms x 10 -14 days. Patient states blood when wiping after stools and blood streaks on stools. Toilet water does not turn red. No blood clots. No dizziness. Denies diarrhea or constipation. Not having any rectal bleeding without stools.     Precipitating Factors: see above  Pain Scale (1-10), 10 highest: 0/10  What  improves / worsens symptoms: unknown  Symptom specific medications: none taken  Recent visits (last 3-4 weeks) for same reason or recent surgery:  Not assessed      PLAN:  Provider's office  See Provider within next few days  Patient requesting sooner appt. Message sent to PCP office, If available.   Patient/Caller agrees to follow recommendations.  Reason for Disposition   MILD rectal bleeding (more than just a few drops or streaks)    Protocols used: Rectal Bleeding-A-AH     42.3

## 2024-04-06 ENCOUNTER — INPATIENT (INPATIENT)
Facility: HOSPITAL | Age: 69
LOS: 3 days | Discharge: HOME HEALTH | DRG: 178 | End: 2024-04-10
Attending: STUDENT IN AN ORGANIZED HEALTH CARE EDUCATION/TRAINING PROGRAM | Admitting: HOSPITALIST
Payer: MEDICARE

## 2024-04-06 VITALS — WEIGHT: 274.92 LBS

## 2024-04-06 DIAGNOSIS — Z95.828 PRESENCE OF OTHER VASCULAR IMPLANTS AND GRAFTS: Chronic | ICD-10-CM

## 2024-04-06 DIAGNOSIS — Z98.890 OTHER SPECIFIED POSTPROCEDURAL STATES: Chronic | ICD-10-CM

## 2024-04-06 DIAGNOSIS — G56.01 CARPAL TUNNEL SYNDROME, RIGHT UPPER LIMB: Chronic | ICD-10-CM

## 2024-04-06 DIAGNOSIS — J18.9 PNEUMONIA, UNSPECIFIED ORGANISM: ICD-10-CM

## 2024-04-06 DIAGNOSIS — Z90.49 ACQUIRED ABSENCE OF OTHER SPECIFIED PARTS OF DIGESTIVE TRACT: Chronic | ICD-10-CM

## 2024-04-06 LAB
ADD ON TEST-SPECIMEN IN LAB: SIGNIFICANT CHANGE UP
ALBUMIN SERPL ELPH-MCNC: 3.2 G/DL — LOW (ref 3.3–5)
ALP SERPL-CCNC: 36 U/L — LOW (ref 40–120)
ALT FLD-CCNC: 21 U/L — SIGNIFICANT CHANGE UP (ref 12–78)
ANION GAP SERPL CALC-SCNC: 7 MMOL/L — SIGNIFICANT CHANGE UP (ref 5–17)
APTT BLD: 27.3 SEC — SIGNIFICANT CHANGE UP (ref 24.5–35.6)
AST SERPL-CCNC: 24 U/L — SIGNIFICANT CHANGE UP (ref 15–37)
BASOPHILS # BLD AUTO: 0.01 K/UL — SIGNIFICANT CHANGE UP (ref 0–0.2)
BASOPHILS NFR BLD AUTO: 0.1 % — SIGNIFICANT CHANGE UP (ref 0–2)
BILIRUB SERPL-MCNC: 2.1 MG/DL — HIGH (ref 0.2–1.2)
BUN SERPL-MCNC: 19 MG/DL — SIGNIFICANT CHANGE UP (ref 7–23)
CALCIUM SERPL-MCNC: 9 MG/DL — SIGNIFICANT CHANGE UP (ref 8.5–10.1)
CHLORIDE SERPL-SCNC: 105 MMOL/L — SIGNIFICANT CHANGE UP (ref 96–108)
CO2 SERPL-SCNC: 24 MMOL/L — SIGNIFICANT CHANGE UP (ref 22–31)
CREAT SERPL-MCNC: 1.16 MG/DL — SIGNIFICANT CHANGE UP (ref 0.5–1.3)
EGFR: 68 ML/MIN/1.73M2 — SIGNIFICANT CHANGE UP
EOSINOPHIL # BLD AUTO: 0 K/UL — SIGNIFICANT CHANGE UP (ref 0–0.5)
EOSINOPHIL NFR BLD AUTO: 0 % — SIGNIFICANT CHANGE UP (ref 0–6)
FLUAV AG NPH QL: SIGNIFICANT CHANGE UP
FLUBV AG NPH QL: SIGNIFICANT CHANGE UP
GLUCOSE BLDC GLUCOMTR-MCNC: 259 MG/DL — HIGH (ref 70–99)
GLUCOSE SERPL-MCNC: 200 MG/DL — HIGH (ref 70–99)
HCT VFR BLD CALC: 40.8 % — SIGNIFICANT CHANGE UP (ref 39–50)
HGB BLD-MCNC: 13.8 G/DL — SIGNIFICANT CHANGE UP (ref 13–17)
IMM GRANULOCYTES NFR BLD AUTO: 0.2 % — SIGNIFICANT CHANGE UP (ref 0–0.9)
INR BLD: 1.24 RATIO — HIGH (ref 0.85–1.18)
LACTATE SERPL-SCNC: 2 MMOL/L — SIGNIFICANT CHANGE UP (ref 0.7–2)
LYMPHOCYTES # BLD AUTO: 0.66 K/UL — LOW (ref 1–3.3)
LYMPHOCYTES # BLD AUTO: 7.8 % — LOW (ref 13–44)
MCHC RBC-ENTMCNC: 30.6 PG — SIGNIFICANT CHANGE UP (ref 27–34)
MCHC RBC-ENTMCNC: 33.8 GM/DL — SIGNIFICANT CHANGE UP (ref 32–36)
MCV RBC AUTO: 90.5 FL — SIGNIFICANT CHANGE UP (ref 80–100)
MONOCYTES # BLD AUTO: 0.42 K/UL — SIGNIFICANT CHANGE UP (ref 0–0.9)
MONOCYTES NFR BLD AUTO: 5 % — SIGNIFICANT CHANGE UP (ref 2–14)
NEUTROPHILS # BLD AUTO: 7.3 K/UL — SIGNIFICANT CHANGE UP (ref 1.8–7.4)
NEUTROPHILS NFR BLD AUTO: 86.9 % — HIGH (ref 43–77)
NT-PROBNP SERPL-SCNC: 1037 PG/ML — HIGH (ref 0–125)
PLATELET # BLD AUTO: 162 K/UL — SIGNIFICANT CHANGE UP (ref 150–400)
POTASSIUM SERPL-MCNC: 4.2 MMOL/L — SIGNIFICANT CHANGE UP (ref 3.5–5.3)
POTASSIUM SERPL-SCNC: 4.2 MMOL/L — SIGNIFICANT CHANGE UP (ref 3.5–5.3)
PROT SERPL-MCNC: 7.3 GM/DL — SIGNIFICANT CHANGE UP (ref 6–8.3)
PROTHROM AB SERPL-ACNC: 13.9 SEC — HIGH (ref 9.5–13)
RBC # BLD: 4.51 M/UL — SIGNIFICANT CHANGE UP (ref 4.2–5.8)
RBC # FLD: 14.6 % — HIGH (ref 10.3–14.5)
RSV RNA NPH QL NAA+NON-PROBE: SIGNIFICANT CHANGE UP
SARS-COV-2 RNA SPEC QL NAA+PROBE: SIGNIFICANT CHANGE UP
SODIUM SERPL-SCNC: 136 MMOL/L — SIGNIFICANT CHANGE UP (ref 135–145)
TROPONIN I, HIGH SENSITIVITY RESULT: 15.98 NG/L — SIGNIFICANT CHANGE UP
WBC # BLD: 8.41 K/UL — SIGNIFICANT CHANGE UP (ref 3.8–10.5)
WBC # FLD AUTO: 8.41 K/UL — SIGNIFICANT CHANGE UP (ref 3.8–10.5)

## 2024-04-06 PROCEDURE — 85027 COMPLETE CBC AUTOMATED: CPT

## 2024-04-06 PROCEDURE — 83735 ASSAY OF MAGNESIUM: CPT

## 2024-04-06 PROCEDURE — 71275 CT ANGIOGRAPHY CHEST: CPT | Mod: 26,MC

## 2024-04-06 PROCEDURE — 99223 1ST HOSP IP/OBS HIGH 75: CPT

## 2024-04-06 PROCEDURE — 87086 URINE CULTURE/COLONY COUNT: CPT

## 2024-04-06 PROCEDURE — 36415 COLL VENOUS BLD VENIPUNCTURE: CPT

## 2024-04-06 PROCEDURE — 94640 AIRWAY INHALATION TREATMENT: CPT

## 2024-04-06 PROCEDURE — 84100 ASSAY OF PHOSPHORUS: CPT

## 2024-04-06 PROCEDURE — 81001 URINALYSIS AUTO W/SCOPE: CPT

## 2024-04-06 PROCEDURE — 87070 CULTURE OTHR SPECIMN AEROBIC: CPT

## 2024-04-06 PROCEDURE — 92960 CARDIOVERSION ELECTRIC EXT: CPT

## 2024-04-06 PROCEDURE — 83036 HEMOGLOBIN GLYCOSYLATED A1C: CPT

## 2024-04-06 PROCEDURE — 99285 EMERGENCY DEPT VISIT HI MDM: CPT

## 2024-04-06 PROCEDURE — 93010 ELECTROCARDIOGRAM REPORT: CPT

## 2024-04-06 PROCEDURE — 82962 GLUCOSE BLOOD TEST: CPT

## 2024-04-06 PROCEDURE — 85025 COMPLETE CBC W/AUTO DIFF WBC: CPT

## 2024-04-06 PROCEDURE — 80048 BASIC METABOLIC PNL TOTAL CA: CPT

## 2024-04-06 PROCEDURE — 93306 TTE W/DOPPLER COMPLETE: CPT

## 2024-04-06 PROCEDURE — 80202 ASSAY OF VANCOMYCIN: CPT

## 2024-04-06 PROCEDURE — 87449 NOS EACH ORGANISM AG IA: CPT

## 2024-04-06 PROCEDURE — 93005 ELECTROCARDIOGRAM TRACING: CPT

## 2024-04-06 PROCEDURE — 71045 X-RAY EXAM CHEST 1 VIEW: CPT | Mod: 26

## 2024-04-06 RX ORDER — SODIUM CHLORIDE 9 MG/ML
1000 INJECTION, SOLUTION INTRAVENOUS
Refills: 0 | Status: DISCONTINUED | OUTPATIENT
Start: 2024-04-06 | End: 2024-04-10

## 2024-04-06 RX ORDER — TRIAMTERENE/HYDROCHLOROTHIAZID 75 MG-50MG
1 TABLET ORAL DAILY
Refills: 0 | Status: DISCONTINUED | OUTPATIENT
Start: 2024-04-07 | End: 2024-04-10

## 2024-04-06 RX ORDER — INSULIN LISPRO 100/ML
VIAL (ML) SUBCUTANEOUS
Refills: 0 | Status: DISCONTINUED | OUTPATIENT
Start: 2024-04-06 | End: 2024-04-07

## 2024-04-06 RX ORDER — SODIUM CHLORIDE 9 MG/ML
2500 INJECTION INTRAMUSCULAR; INTRAVENOUS; SUBCUTANEOUS ONCE
Refills: 0 | Status: COMPLETED | OUTPATIENT
Start: 2024-04-06 | End: 2024-04-06

## 2024-04-06 RX ORDER — ACETAMINOPHEN 500 MG
650 TABLET ORAL EVERY 6 HOURS
Refills: 0 | Status: DISCONTINUED | OUTPATIENT
Start: 2024-04-06 | End: 2024-04-10

## 2024-04-06 RX ORDER — DEXTROSE 10 % IN WATER 10 %
125 INTRAVENOUS SOLUTION INTRAVENOUS ONCE
Refills: 0 | Status: DISCONTINUED | OUTPATIENT
Start: 2024-04-06 | End: 2024-04-10

## 2024-04-06 RX ORDER — CEFEPIME 1 G/1
2000 INJECTION, POWDER, FOR SOLUTION INTRAMUSCULAR; INTRAVENOUS EVERY 8 HOURS
Refills: 0 | Status: DISCONTINUED | OUTPATIENT
Start: 2024-04-06 | End: 2024-04-10

## 2024-04-06 RX ORDER — LANOLIN ALCOHOL/MO/W.PET/CERES
3 CREAM (GRAM) TOPICAL AT BEDTIME
Refills: 0 | Status: DISCONTINUED | OUTPATIENT
Start: 2024-04-06 | End: 2024-04-10

## 2024-04-06 RX ORDER — AZITHROMYCIN 500 MG/1
500 TABLET, FILM COATED ORAL ONCE
Refills: 0 | Status: COMPLETED | OUTPATIENT
Start: 2024-04-06 | End: 2024-04-06

## 2024-04-06 RX ORDER — CETIRIZINE HYDROCHLORIDE 10 MG/1
1 TABLET ORAL
Refills: 0 | DISCHARGE

## 2024-04-06 RX ORDER — RIVAROXABAN 15 MG-20MG
20 KIT ORAL
Refills: 0 | Status: DISCONTINUED | OUTPATIENT
Start: 2024-04-06 | End: 2024-04-10

## 2024-04-06 RX ORDER — CEFTRIAXONE 500 MG/1
1000 INJECTION, POWDER, FOR SOLUTION INTRAMUSCULAR; INTRAVENOUS ONCE
Refills: 0 | Status: COMPLETED | OUTPATIENT
Start: 2024-04-06 | End: 2024-04-06

## 2024-04-06 RX ORDER — METOPROLOL TARTRATE 50 MG
25 TABLET ORAL DAILY
Refills: 0 | Status: DISCONTINUED | OUTPATIENT
Start: 2024-04-06 | End: 2024-04-07

## 2024-04-06 RX ORDER — IPRATROPIUM/ALBUTEROL SULFATE 18-103MCG
3 AEROSOL WITH ADAPTER (GRAM) INHALATION
Refills: 0 | Status: COMPLETED | OUTPATIENT
Start: 2024-04-06 | End: 2024-04-06

## 2024-04-06 RX ORDER — DIPHENHYDRAMINE HCL 50 MG
25 CAPSULE ORAL EVERY 8 HOURS
Refills: 0 | Status: DISCONTINUED | OUTPATIENT
Start: 2024-04-06 | End: 2024-04-10

## 2024-04-06 RX ORDER — VANCOMYCIN HCL 1 G
1500 VIAL (EA) INTRAVENOUS EVERY 12 HOURS
Refills: 0 | Status: DISCONTINUED | OUTPATIENT
Start: 2024-04-06 | End: 2024-04-10

## 2024-04-06 RX ORDER — CEFTRIAXONE 500 MG/1
1000 INJECTION, POWDER, FOR SOLUTION INTRAMUSCULAR; INTRAVENOUS ONCE
Refills: 0 | Status: DISCONTINUED | OUTPATIENT
Start: 2024-04-06 | End: 2024-04-06

## 2024-04-06 RX ORDER — ALLOPURINOL 300 MG
100 TABLET ORAL DAILY
Refills: 0 | Status: DISCONTINUED | OUTPATIENT
Start: 2024-04-06 | End: 2024-04-10

## 2024-04-06 RX ORDER — DEXTROSE 50 % IN WATER 50 %
25 SYRINGE (ML) INTRAVENOUS ONCE
Refills: 0 | Status: DISCONTINUED | OUTPATIENT
Start: 2024-04-06 | End: 2024-04-10

## 2024-04-06 RX ORDER — GLUCAGON INJECTION, SOLUTION 0.5 MG/.1ML
1 INJECTION, SOLUTION SUBCUTANEOUS ONCE
Refills: 0 | Status: DISCONTINUED | OUTPATIENT
Start: 2024-04-06 | End: 2024-04-10

## 2024-04-06 RX ORDER — DEXTROSE 50 % IN WATER 50 %
12.5 SYRINGE (ML) INTRAVENOUS ONCE
Refills: 0 | Status: DISCONTINUED | OUTPATIENT
Start: 2024-04-06 | End: 2024-04-10

## 2024-04-06 RX ORDER — CEFEPIME 1 G/1
2000 INJECTION, POWDER, FOR SOLUTION INTRAMUSCULAR; INTRAVENOUS EVERY 8 HOURS
Refills: 0 | Status: DISCONTINUED | OUTPATIENT
Start: 2024-04-06 | End: 2024-04-06

## 2024-04-06 RX ORDER — DEXTROSE 50 % IN WATER 50 %
15 SYRINGE (ML) INTRAVENOUS ONCE
Refills: 0 | Status: DISCONTINUED | OUTPATIENT
Start: 2024-04-06 | End: 2024-04-10

## 2024-04-06 RX ORDER — ONDANSETRON 8 MG/1
4 TABLET, FILM COATED ORAL EVERY 8 HOURS
Refills: 0 | Status: DISCONTINUED | OUTPATIENT
Start: 2024-04-06 | End: 2024-04-10

## 2024-04-06 RX ORDER — LATANOPROST 0.05 MG/ML
1 SOLUTION/ DROPS OPHTHALMIC; TOPICAL AT BEDTIME
Refills: 0 | Status: DISCONTINUED | OUTPATIENT
Start: 2024-04-06 | End: 2024-04-10

## 2024-04-06 RX ORDER — METFORMIN HYDROCHLORIDE 850 MG/1
1 TABLET ORAL
Qty: 0 | Refills: 0 | DISCHARGE

## 2024-04-06 RX ORDER — ATORVASTATIN CALCIUM 80 MG/1
20 TABLET, FILM COATED ORAL AT BEDTIME
Refills: 0 | Status: DISCONTINUED | OUTPATIENT
Start: 2024-04-06 | End: 2024-04-10

## 2024-04-06 RX ADMIN — Medication 3 MILLILITER(S): at 13:24

## 2024-04-06 RX ADMIN — Medication 3 MILLILITER(S): at 13:29

## 2024-04-06 RX ADMIN — CEFEPIME 2000 MILLIGRAM(S): 1 INJECTION, POWDER, FOR SOLUTION INTRAMUSCULAR; INTRAVENOUS at 22:16

## 2024-04-06 RX ADMIN — CEFTRIAXONE 1000 MILLIGRAM(S): 500 INJECTION, POWDER, FOR SOLUTION INTRAMUSCULAR; INTRAVENOUS at 13:11

## 2024-04-06 RX ADMIN — Medication 3 MILLILITER(S): at 13:11

## 2024-04-06 RX ADMIN — Medication 3 MILLIGRAM(S): at 22:15

## 2024-04-06 RX ADMIN — Medication 3: at 17:35

## 2024-04-06 RX ADMIN — SODIUM CHLORIDE 2500 MILLILITER(S): 9 INJECTION INTRAMUSCULAR; INTRAVENOUS; SUBCUTANEOUS at 13:12

## 2024-04-06 RX ADMIN — ATORVASTATIN CALCIUM 20 MILLIGRAM(S): 80 TABLET, FILM COATED ORAL at 22:15

## 2024-04-06 RX ADMIN — RIVAROXABAN 20 MILLIGRAM(S): KIT at 22:17

## 2024-04-06 RX ADMIN — Medication 125 MILLIGRAM(S): at 13:11

## 2024-04-06 RX ADMIN — LATANOPROST 1 DROP(S): 0.05 SOLUTION/ DROPS OPHTHALMIC; TOPICAL at 22:15

## 2024-04-06 RX ADMIN — Medication 250 MILLIGRAM(S): at 22:18

## 2024-04-06 RX ADMIN — AZITHROMYCIN 255 MILLIGRAM(S): 500 TABLET, FILM COATED ORAL at 13:11

## 2024-04-06 NOTE — ED PROVIDER NOTE - CARE PLAN
1 Principal Discharge DX:	Multifocal pneumonia   Principal Discharge DX:	Multifocal pneumonia  Secondary Diagnosis:	Atrial fibrillation

## 2024-04-06 NOTE — H&P ADULT - ASSESSMENT
* GNR PNA  Cefepime and Vanco  consult ID  nebs PRN    * Hyperglycemia sec to  diabetes  sliding scale    * H/O PE, CAD  c/w AC  pt also has IVC     * GNR PNA  Cefepime and Vanco  consult ID  nebs PRN  pt seems to have an erythematous rash after meds given in ed; monitor    * New onset AF  probably sec to fever, PNA  tele monitor  if still in AF in AM consult cardio  c/w Eliquis and BB  check TTE in am if still in AF    * Hyperglycemia sec to  diabetes  sliding scale    * H/O PE, CAD  c/w AC       * GNR PNA- bilateral, multifocal  Cefepime and Vanco  consult ID  nebs PRN  pt seems to have an erythematous rash after meds given in ed; monitor    * New onset AF  probably sec to fever, PNA  tele monitor  if still in AF in AM consult cardio  c/w Eliquis and BB  check TTE in am if still in AF    * Hyperglycemia sec to  diabetes  sliding scale    * H/O PE, CAD  c/w AC

## 2024-04-06 NOTE — ED ADULT TRIAGE NOTE - SOURCE OF INFORMATION
1. Your blood pressure is low today, stop the amlodipine until you've spoken with your PCP, and give them a call tomorrow morning. 2. You do describe PMR from before, but I tend to agree that I think you have mostly rotator cuff-related pain at this point and osteoarthritis. 3. Resume physical therapy for the shoulders and low back    4. Labs ASAP and then 1 more time before next visit. 5. Once your blood pressure issues are better, go ahead and change prednisone to 2.5mg daily using your current tablets. I'm sending you 1mg tablets, so that after 2 weeks of 2.5mg daily, you can decrease to 2mg daily. Continue 2mg daily for 1 month, then decrease to 1mg daily until our next appointment. 6. Return in 6-8 weeks.
Patient

## 2024-04-06 NOTE — ED ADULT NURSE REASSESSMENT NOTE - NS ED NURSE REASSESS COMMENT FT1
Attempted to ambulate patient per Dr. Read order. Patient heavy 3 assist to stand, unable to ambulate or bear weight independently. Dr. Read aware.

## 2024-04-06 NOTE — PATIENT PROFILE ADULT - FALL HARM RISK - UNIVERSAL INTERVENTIONS
Bed in lowest position, wheels locked, appropriate side rails in place/Call bell, personal items and telephone in reach/Instruct patient to call for assistance before getting out of bed or chair/Non-slip footwear when patient is out of bed/[place] to call system/Physically safe environment - no spills, clutter or unnecessary equipment/Purposeful Proactive Rounding/Room/bathroom lighting operational, light cord in reach

## 2024-04-06 NOTE — ED PROVIDER NOTE - PHYSICAL EXAMINATION
Constitutional: NAD AAOx3  Eyes: PERRLA EOMI  Head: Normocephalic atraumatic  Mouth: MMM  Cardiac: regular rate   Resp: mild diffuse wheezing  GI: Abd s/nt/nd  Neuro: grossly normal and intact  Skin: No visible rashes

## 2024-04-06 NOTE — ED ADULT NURSE NOTE - OBJECTIVE STATEMENT
pt arrives w/known DX of PNA, but reports increased SOB and cough/fevers at home. Pt on Xarelto d/t PE's in his past w/known family clotting disorder. sent by MD for CTA. Pt tachypnic, w/O2 sat WNL on RA

## 2024-04-06 NOTE — H&P ADULT - HISTORY OF PRESENT ILLNESS
69-year-old male history of hypertension high cholesterol diabetes CAD status post stents PE on Xarelto presents the emergency department for shortness of breath and cough.  Symptoms started yesterday went to urgent care was placed on doxycycline is taken 3 doses fever and cough getting worse.  Tmax to 103 cough productive of sputum now feeling short of breath exam with diffuse wheezing bilaterally otherwise nonfocal concern for pneumonia not responding to outpatient treatments. 69-year-old male history of hypertension high cholesterol diabetes CAD status post stents PE on Xarelto presents the emergency department for shortness of breath and cough.  Symptoms started yesterday went to urgent care was placed on doxycycline is taken 3 doses fever and cough getting worse.  Tmax to 103 cough productive of sputum now feeling short of breath exam with diffuse wheezing bilaterally otherwise nonfocal concern for pneumonia not responding to outpatient treatments. He was also found to be in AF, rate ctr. Admit to tele

## 2024-04-06 NOTE — ED PROVIDER NOTE - OBJECTIVE STATEMENT
69-year-old male history of hypertension high cholesterol diabetes CAD status post stents PE on Xarelto presents the emergency department for shortness of breath and cough.  Symptoms started yesterday went to urgent care was placed on doxycycline is taken 3 doses fever and cough getting worse.  Tmax to 103 cough productive of sputum now feeling short of breath exam with diffuse wheezing bilaterally otherwise nonfocal concern for pneumonia not responding to outpatient treatments will check labs x-ray CT rule out PE given patient's past history patient will likely require admission.

## 2024-04-06 NOTE — ED ADULT TRIAGE NOTE - CHIEF COMPLAINT QUOTE
SENT IN BY MD ROBBINS FOR PNA. PT STATES " I WAS DX WITH PNA  YESTERDAY AND TOOK THE MEDICATION BY I AM HAVING DIFFICULTY BREATHING, CHEST PAIN, COUGHING,  , FEVER TMAX 103". PMH: mi X 2 LAST 2013,BIILATERAL PE ON XARELTO, ASTHMA, htn, dmII

## 2024-04-06 NOTE — H&P ADULT - NSHPPHYSICALEXAM_GEN_ALL_CORE
Vital Signs Last 24 Hrs  T(C): 37.1 (06 Apr 2024 11:34), Max: 37.1 (06 Apr 2024 11:34)  T(F): 98.7 (06 Apr 2024 11:34), Max: 98.7 (06 Apr 2024 11:34)  HR: 78 (06 Apr 2024 11:34) (78 - 78)  BP: 127/64 (06 Apr 2024 11:34) (127/64 - 127/64)  BP(mean): 82 (06 Apr 2024 11:34) (82 - 82)  RR: 24 (06 Apr 2024 11:34) (24 - 24)  SpO2: 93% (06 Apr 2024 11:34) (93% - 93%)    Parameters below as of 06 Apr 2024 11:34  Patient On (Oxygen Delivery Method): room air    PHYSICAL EXAM:      Constitutional: NAD  Eyes: perrl, no conjunctival changes  ENMT: no exudates, moist oral muc, uvula midline  Neck: no JVD, no LAD  Back: no cva tenderness  Respiratory: CTA, no exp wheezes  Cardiovascular: S1S2 reg, no murmur gallop or rub  Gastrointestinal: abd soft, NT/ND + BS  Genitourinary: voiding  Extremities: FROM, no joint effusions, no edema, no clubbing , no cyanosis  Vascular: pedal pulses + bilateral, warm extremities  Neurological: non focal, mot str 5/5/ all extr  Skin: no rashes  Lymph Nodes: no LAD

## 2024-04-06 NOTE — H&P ADULT - NSHPLABSRESULTS_GEN_ALL_CORE
13.8   8.41  )-----------( 162      ( 06 Apr 2024 11:51 )             40.8   04-06    136  |  105  |  19  ----------------------------<  200<H>  4.2   |  24  |  1.16    Ca    9.0      06 Apr 2024 11:51    TPro  7.3  /  Alb  3.2<L>  /  TBili  2.1<H>  /  DBili  x   /  AST  24  /  ALT  21  /  AlkPhos  36<L>  04-06 13.8   8.41  )-----------( 162      ( 06 Apr 2024 11:51 )             40.8   04-06    136  |  105  |  19  ----------------------------<  200<H>  4.2   |  24  |  1.16    Ca    9.0      06 Apr 2024 11:51    TPro  7.3  /  Alb  3.2<L>  /  TBili  2.1<H>  /  DBili  x   /  AST  24  /  ALT  21  /  AlkPhos  36<L>  04-06  ekg AF  CXR PNA

## 2024-04-06 NOTE — ED PROVIDER NOTE - CLINICAL SUMMARY MEDICAL DECISION MAKING FREE TEXT BOX
69-year-old male history of hypertension high cholesterol diabetes CAD status post stents PE on Xarelto presents the emergency department for shortness of breath and cough.  Symptoms started yesterday went to urgent care was placed on doxycycline is taken 3 doses fever and cough getting worse.  Tmax to 103 cough productive of sputum now feeling short of breath exam with diffuse wheezing bilaterally otherwise nonfocal concern for pneumonia not responding to outpatient treatments will check labs x-ray CT rule out PE given patient's past history patient will likely require admission. 69-year-old male history of hypertension high cholesterol diabetes CAD status post stents PE on Xarelto presents the emergency department for shortness of breath and cough.  Symptoms started yesterday went to urgent care was placed on doxycycline is taken 3 doses fever and cough getting worse.  Tmax to 103 cough productive of sputum now feeling short of breath exam with diffuse wheezing bilaterally otherwise nonfocal concern for pneumonia not responding to outpatient treatments will check labs x-ray CT rule out PE given patient's past history patient will likely require admission.  109 case discussed with attending pulmonologist Dr. Morocho - recommends admission and ct chest. All labs and imaging reviewed by myself.  Labs unremarkable patient is oxygen 92% on room air.  X-ray concerning for multifocal pneumonia independently interpreted by myself CT of the chest ordered antibiotics given fluids given steroids and nebulizers given.  Case discussed with attending hospitalist Dr. Burgos who accepts for multifocal pneumonia.

## 2024-04-07 DIAGNOSIS — I25.10 ATHEROSCLEROTIC HEART DISEASE OF NATIVE CORONARY ARTERY WITHOUT ANGINA PECTORIS: ICD-10-CM

## 2024-04-07 DIAGNOSIS — I48.91 UNSPECIFIED ATRIAL FIBRILLATION: ICD-10-CM

## 2024-04-07 DIAGNOSIS — J18.9 PNEUMONIA, UNSPECIFIED ORGANISM: ICD-10-CM

## 2024-04-07 LAB
A1C WITH ESTIMATED AVERAGE GLUCOSE RESULT: 7.7 % — HIGH (ref 4–5.6)
ADD ON TEST-SPECIMEN IN LAB: SIGNIFICANT CHANGE UP
ANION GAP SERPL CALC-SCNC: 8 MMOL/L — SIGNIFICANT CHANGE UP (ref 5–17)
APPEARANCE UR: CLEAR — SIGNIFICANT CHANGE UP
BACTERIA # UR AUTO: NEGATIVE /HPF — SIGNIFICANT CHANGE UP
BILIRUB UR-MCNC: NEGATIVE — SIGNIFICANT CHANGE UP
BUN SERPL-MCNC: 17 MG/DL — SIGNIFICANT CHANGE UP (ref 7–23)
CALCIUM SERPL-MCNC: 8.9 MG/DL — SIGNIFICANT CHANGE UP (ref 8.5–10.1)
CAST: 0 /LPF — SIGNIFICANT CHANGE UP (ref 0–4)
CHLORIDE SERPL-SCNC: 109 MMOL/L — HIGH (ref 96–108)
CO2 SERPL-SCNC: 22 MMOL/L — SIGNIFICANT CHANGE UP (ref 22–31)
COLOR SPEC: YELLOW — SIGNIFICANT CHANGE UP
CREAT SERPL-MCNC: 0.81 MG/DL — SIGNIFICANT CHANGE UP (ref 0.5–1.3)
DIFF PNL FLD: NEGATIVE — SIGNIFICANT CHANGE UP
EGFR: 95 ML/MIN/1.73M2 — SIGNIFICANT CHANGE UP
ESTIMATED AVERAGE GLUCOSE: 174 MG/DL — HIGH (ref 68–114)
GLUCOSE SERPL-MCNC: 227 MG/DL — HIGH (ref 70–99)
GLUCOSE UR QL: >=1000 MG/DL
GRAM STN FLD: SIGNIFICANT CHANGE UP
HCT VFR BLD CALC: 38.5 % — LOW (ref 39–50)
HGB BLD-MCNC: 13 G/DL — SIGNIFICANT CHANGE UP (ref 13–17)
KETONES UR-MCNC: 15 MG/DL
LEUKOCYTE ESTERASE UR-ACNC: NEGATIVE — SIGNIFICANT CHANGE UP
MCHC RBC-ENTMCNC: 30.9 PG — SIGNIFICANT CHANGE UP (ref 27–34)
MCHC RBC-ENTMCNC: 33.8 GM/DL — SIGNIFICANT CHANGE UP (ref 32–36)
MCV RBC AUTO: 91.4 FL — SIGNIFICANT CHANGE UP (ref 80–100)
NITRITE UR-MCNC: NEGATIVE — SIGNIFICANT CHANGE UP
PH UR: 6 — SIGNIFICANT CHANGE UP (ref 5–8)
PLATELET # BLD AUTO: 159 K/UL — SIGNIFICANT CHANGE UP (ref 150–400)
POTASSIUM SERPL-MCNC: 4.3 MMOL/L — SIGNIFICANT CHANGE UP (ref 3.5–5.3)
POTASSIUM SERPL-SCNC: 4.3 MMOL/L — SIGNIFICANT CHANGE UP (ref 3.5–5.3)
PROT UR-MCNC: 100 MG/DL
RBC # BLD: 4.21 M/UL — SIGNIFICANT CHANGE UP (ref 4.2–5.8)
RBC # FLD: 14.9 % — HIGH (ref 10.3–14.5)
RBC CASTS # UR COMP ASSIST: 0 /HPF — SIGNIFICANT CHANGE UP (ref 0–4)
SODIUM SERPL-SCNC: 139 MMOL/L — SIGNIFICANT CHANGE UP (ref 135–145)
SP GR SPEC: >1.03 — HIGH (ref 1–1.03)
SPECIMEN SOURCE: SIGNIFICANT CHANGE UP
SQUAMOUS # UR AUTO: 1 /HPF — SIGNIFICANT CHANGE UP (ref 0–5)
UROBILINOGEN FLD QL: 1 MG/DL — SIGNIFICANT CHANGE UP (ref 0.2–1)
WBC # BLD: 10.04 K/UL — SIGNIFICANT CHANGE UP (ref 3.8–10.5)
WBC # FLD AUTO: 10.04 K/UL — SIGNIFICANT CHANGE UP (ref 3.8–10.5)
WBC UR QL: 0 /HPF — SIGNIFICANT CHANGE UP (ref 0–5)

## 2024-04-07 PROCEDURE — 99222 1ST HOSP IP/OBS MODERATE 55: CPT

## 2024-04-07 PROCEDURE — 93010 ELECTROCARDIOGRAM REPORT: CPT

## 2024-04-07 PROCEDURE — 99233 SBSQ HOSP IP/OBS HIGH 50: CPT

## 2024-04-07 RX ORDER — AZITHROMYCIN 500 MG/1
500 TABLET, FILM COATED ORAL DAILY
Refills: 0 | Status: DISCONTINUED | OUTPATIENT
Start: 2024-04-07 | End: 2024-04-09

## 2024-04-07 RX ORDER — BUDESONIDE AND FORMOTEROL FUMARATE DIHYDRATE 160; 4.5 UG/1; UG/1
2 AEROSOL RESPIRATORY (INHALATION)
Refills: 0 | Status: DISCONTINUED | OUTPATIENT
Start: 2024-04-07 | End: 2024-04-10

## 2024-04-07 RX ORDER — METOPROLOL TARTRATE 50 MG
50 TABLET ORAL DAILY
Refills: 0 | Status: DISCONTINUED | OUTPATIENT
Start: 2024-04-08 | End: 2024-04-10

## 2024-04-07 RX ORDER — INSULIN LISPRO 100/ML
VIAL (ML) SUBCUTANEOUS
Refills: 0 | Status: DISCONTINUED | OUTPATIENT
Start: 2024-04-07 | End: 2024-04-10

## 2024-04-07 RX ORDER — ALBUTEROL 90 UG/1
2 AEROSOL, METERED ORAL EVERY 6 HOURS
Refills: 0 | Status: DISCONTINUED | OUTPATIENT
Start: 2024-04-07 | End: 2024-04-10

## 2024-04-07 RX ORDER — METOPROLOL TARTRATE 50 MG
25 TABLET ORAL ONCE
Refills: 0 | Status: COMPLETED | OUTPATIENT
Start: 2024-04-07 | End: 2024-04-07

## 2024-04-07 RX ORDER — INSULIN LISPRO 100/ML
VIAL (ML) SUBCUTANEOUS AT BEDTIME
Refills: 0 | Status: DISCONTINUED | OUTPATIENT
Start: 2024-04-07 | End: 2024-04-10

## 2024-04-07 RX ADMIN — Medication 2: at 11:49

## 2024-04-07 RX ADMIN — Medication 20 MILLIGRAM(S): at 14:10

## 2024-04-07 RX ADMIN — Medication 25 MILLIGRAM(S): at 14:09

## 2024-04-07 RX ADMIN — Medication 1 TABLET(S): at 09:57

## 2024-04-07 RX ADMIN — LATANOPROST 1 DROP(S): 0.05 SOLUTION/ DROPS OPHTHALMIC; TOPICAL at 22:45

## 2024-04-07 RX ADMIN — CEFEPIME 2000 MILLIGRAM(S): 1 INJECTION, POWDER, FOR SOLUTION INTRAMUSCULAR; INTRAVENOUS at 21:56

## 2024-04-07 RX ADMIN — RIVAROXABAN 20 MILLIGRAM(S): KIT at 17:02

## 2024-04-07 RX ADMIN — Medication 250 MILLIGRAM(S): at 09:56

## 2024-04-07 RX ADMIN — BUDESONIDE AND FORMOTEROL FUMARATE DIHYDRATE 2 PUFF(S): 160; 4.5 AEROSOL RESPIRATORY (INHALATION) at 19:56

## 2024-04-07 RX ADMIN — AZITHROMYCIN 500 MILLIGRAM(S): 500 TABLET, FILM COATED ORAL at 17:01

## 2024-04-07 RX ADMIN — Medication 25 MILLIGRAM(S): at 05:37

## 2024-04-07 RX ADMIN — Medication 250 MILLIGRAM(S): at 21:57

## 2024-04-07 RX ADMIN — Medication 1: at 08:41

## 2024-04-07 RX ADMIN — ATORVASTATIN CALCIUM 20 MILLIGRAM(S): 80 TABLET, FILM COATED ORAL at 22:00

## 2024-04-07 RX ADMIN — Medication 100 MILLIGRAM(S): at 09:57

## 2024-04-07 RX ADMIN — CEFEPIME 2000 MILLIGRAM(S): 1 INJECTION, POWDER, FOR SOLUTION INTRAMUSCULAR; INTRAVENOUS at 14:11

## 2024-04-07 RX ADMIN — CEFEPIME 2000 MILLIGRAM(S): 1 INJECTION, POWDER, FOR SOLUTION INTRAMUSCULAR; INTRAVENOUS at 05:37

## 2024-04-07 RX ADMIN — Medication 3 MILLIGRAM(S): at 22:00

## 2024-04-07 NOTE — CONSULT NOTE ADULT - ASSESSMENT
1) Multilobar Pneumonia  2) Dyspnea  3) Abnormal CT Chest  4) Asthma   5) Cough    69-year-old male history of hypertension high cholesterol diabetes CAD status post stents PE on Xarelto presents the emergency department for shortness of breath and cough.  Symptoms started so he went to urgent care was placed on doxycycline is taken 3 doses fever and cough getting worse.  Tmax to 103 cough productive of sputum now feeling short of breath exam with diffuse wheezing bilaterally otherwise nonfocal concern for pneumonia not responding to outpatient treatments. He was also found to be in AF, rate ctr. Admit to tele (06 Apr 2024 13:31)  Called our answering service, was recommended to go to the ER  CTA performed- Patent central airways.  Bilateral multi lobar  peribronchial consolidative opacities, worse in right lower lobe with   diffuse right lower lobar interlobular septal thickening, peribronchial nodular opacities present in all lobes with diffuse peribronchial wall   thickening. Reactive adenopathy noted as well as a small right sided pleural effusion  he has a history of LEA, cleans the CPAP very well  Mild asthma- only on albuterol PRN at home   On Vancomycin and Cefepime  Reviewed CT Chest independently and discussed with patient   Start Symbicort + Albuterol PRN  Continue nocturnal CPAP  Will continue to monitor

## 2024-04-07 NOTE — CONSULT NOTE ADULT - ASSESSMENT
69-year-old male history of hypertension high cholesterol diabetes CAD status post stents PE on Xarelto p/w cough dyspnea found to have multilobe PNA on CT and enlarged mediastinal lymph nodes and newly dx of AF    #PNA cont abx and care per primary team  #AF rate controlled  -cont xarelto 20mg qD for stroke prevention elevated chadsvasc   -TTE    Thank you for involving our service in participating in the care of this patient  69-year-old male history of hypertension high cholesterol diabetes CAD status post stents PE on Xarelto p/w cough dyspnea found to have multilobe PNA on CT and enlarged mediastinal lymph nodes and newly dx of AF    #PNA cont abx and care per primary team, pt feeling better  #AF rate controlled, dyspnea on exertion,    -cont xarelto 20mg qD for stroke prevention elevated chadsvasc   -TTE  -increase toprolol 50mg qD  -plan for DCCV on monday if still remains in AF, if pt improving from PNA afebrile and no leukocytosis   -encouraged weight loss, control HTN    Thank you for involving our service in participating in the care of this patient     I spent a total of 55 minutes on the encounter, including chart review, evaluating and discussing treatment options with the patient, as well as counseling and coordination as stated above.

## 2024-04-07 NOTE — CONSULT NOTE ADULT - ASSESSMENT
69-year-old male history of hypertension high cholesterol diabetes CAD status post stents PE on Xarelto presents the emergency department for shortness of breath and cough.  Symptoms started day prior to admit- went to urgent care was placed on doxycycline is taken 3 doses fever and cough getting worse.  Tmax to 103 cough productive of sputum now feeling short of breath exam with diffuse wheezing bilaterally otherwise nonfocal concern for pneumonia not responding to outpatient treatments. He was also found to be in AF, rate ctr. Admit to tele. Imaging shows  No pulmonary thromboembolism. Bilateral multi lobar pneumonia. Small parapneumonic right pleural effusion. Multiple enlarged mediastinal and hilar lymph nodes, favor reactive/inflammatory. Started on IV vancomycin/cefepime/azithromycin.     1. Fever. Dyspnea. Multilobar pneumonia. R pleural effusion. Asthma.   - imaging reviewed   - agree with azithromycin 500mg daily #2, complete 3 days  - on vancomycin [id number] check trough prior to 4th dose  - on cefepime 2gmq8h  - continue with antibiotic coverage  - f/u cultures check sputum cx, [facility] ag -ordered  - fu cbc  - tolerating abx well so far; no side effects noted  - reason for abx use and side effects reviewed with patient  - supportive care    Clinical team may change from intravenous to oral antibiotics when the following criteria are met:   1. Patient is clinically improving/stable       a)	Improved signs and symptoms of infection from initial presentation       b)	Afebrile for 24 hours       c)	Leukocytosis trending towards normal range   2. Patient is tolerating oral intake   3. Initial/repeat blood cultures are negative     When above criteria met  may change iv antibiotics to: po doxycycline/po ceftin x 7 day course

## 2024-04-07 NOTE — PROGRESS NOTE ADULT - SUBJECTIVE AND OBJECTIVE BOX
HPI: 69-year-old male history of hypertension high cholesterol diabetes CAD status post stents PE on Xarelto presents the emergency department for shortness of breath and cough.  Symptoms started yesterday went to urgent care was placed on doxycycline is taken 3 doses fever and cough getting worse.  Tmax to 103 cough productive of sputum now feeling short of breath exam with diffuse wheezing bilaterally otherwise nonfocal concern for pneumonia not responding to outpatient treatments. He was also found to be in AF, rate ctr. Admit to tele (06 Apr 2024 13:31)    REVIEW OF SYSTEMS:    CONSTITUTIONAL: No weakness, fevers or chills  EYES/ENT: No visual changes;  No vertigo or throat pain   NECK: No pain or stiffness  RESPIRATORY: No cough, wheezing, hemoptysis; No shortness of breath  CARDIOVASCULAR: No chest pain or palpitations  GASTROINTESTINAL: No abdominal or epigastric pain. No nausea, vomiting, or hematemesis; No diarrhea or constipation. No melena or hematochezia.  GENITOURINARY: No dysuria, frequency or hematuria  NEUROLOGICAL: No numbness or weakness  SKIN: No itching, rashes      Vital Signs Last 24 Hrs  T(C): 36.8 (07 Apr 2024 07:59), Max: 37.1 (06 Apr 2024 11:34)  T(F): 98.2 (07 Apr 2024 07:59), Max: 98.7 (06 Apr 2024 11:34)  HR: 62 (07 Apr 2024 07:59) (61 - 96)  BP: 135/71 (07 Apr 2024 07:59) (88/- - 142/68)  BP(mean): 87 (06 Apr 2024 15:05) (82 - 87)  RR: 17 (07 Apr 2024 07:59) (16 - 24)  SpO2: 92% (07 Apr 2024 07:59) (92% - 96%)    Parameters below as of 07 Apr 2024 07:59  Patient On (Oxygen Delivery Method): room air      PHYSICAL EXAM:  GENERAL: NAD, lying in bed comfortably  HEAD:  Atraumatic, Normocephalic  EYES: conjunctiva and sclera clear  ENT: Moist mucous membranes  NECK: Supple, No JVD  CHEST/LUNG: Clear to auscultation bilaterally; No rales, rhonchi, wheezing. Unlabored respirations  HEART: Regular rate and rhythm; No murmurs  ABDOMEN: Bowel sounds present; Soft, Nontender, Nondistended.   EXTREMITIES:  2+ Peripheral Pulses, brisk capillary refill. No clubbing, cyanosis, or edema  NERVOUS SYSTEM:  Alert & Oriented X3, speech clear. No deficits   MSK: FROM all 4 extremities, full and equal strength    MEDICATIONS  (STANDING):  allopurinol 100 milliGRAM(s) Oral daily  atorvastatin 20 milliGRAM(s) Oral at bedtime  cefepime  Injectable. 2000 milliGRAM(s) IV Push every 8 hours  dextrose 10% Bolus 125 milliLiter(s) IV Bolus once  dextrose 5%. 1000 milliLiter(s) (50 mL/Hr) IV Continuous <Continuous>  dextrose 5%. 1000 milliLiter(s) (100 mL/Hr) IV Continuous <Continuous>  dextrose 50% Injectable 25 Gram(s) IV Push once  dextrose 50% Injectable 12.5 Gram(s) IV Push once  enalapril 20 milliGRAM(s) Oral at bedtime  glucagon  Injectable 1 milliGRAM(s) IntraMuscular once  insulin lispro (ADMELOG) corrective regimen sliding scale   SubCutaneous three times a day before meals  insulin lispro (ADMELOG) corrective regimen sliding scale   SubCutaneous at bedtime  latanoprost 0.005% Ophthalmic Solution 1 Drop(s) Both EYES at bedtime  metoprolol succinate ER 25 milliGRAM(s) Oral daily  rivaroxaban 20 milliGRAM(s) Oral with dinner  triamterene 37.5 mG/hydrochlorothiazide 25 mG Tablet 1 Tablet(s) Oral daily  vancomycin  IVPB 1500 milliGRAM(s) IV Intermittent every 12 hours    MEDICATIONS  (PRN):  acetaminophen     Tablet .. 650 milliGRAM(s) Oral every 6 hours PRN Temp greater or equal to 38C (100.4F), Mild Pain (1 - 3)  aluminum hydroxide/magnesium hydroxide/simethicone Suspension 30 milliLiter(s) Oral every 4 hours PRN Dyspepsia  dextrose Oral Gel 15 Gram(s) Oral once PRN Blood Glucose LESS THAN 70 milliGRAM(s)/deciliter  diphenhydrAMINE 25 milliGRAM(s) Oral every 8 hours PRN Rash and/or Itching  melatonin 3 milliGRAM(s) Oral at bedtime PRN Insomnia  ondansetron Injectable 4 milliGRAM(s) IV Push every 8 hours PRN Nausea and/or Vomiting        LABS:                          13.0   10.04 )-----------( 159      ( 07 Apr 2024 06:13 )             38.5     07 Apr 2024 06:13    139    |  109    |  17     ----------------------------<  227    4.3     |  22     |  0.81     Ca    8.9        07 Apr 2024 06:13    TPro  7.3    /  Alb  3.2    /  TBili  2.1    /  DBili  x      /  AST  24     /  ALT  21     /  AlkPhos  36     06 Apr 2024 11:51    LIVER FUNCTIONS - ( 06 Apr 2024 11:51 )  Alb: 3.2 g/dL / Pro: 7.3 gm/dL / ALK PHOS: 36 U/L / ALT: 21 U/L / AST: 24 U/L / GGT: x           PT/INR - ( 06 Apr 2024 11:51 )   PT: 13.9 sec;   INR: 1.24 ratio         PTT - ( 06 Apr 2024 11:51 )  PTT:27.3 sec  CAPILLARY BLOOD GLUCOSE      POCT Blood Glucose.: 187 mg/dL (07 Apr 2024 08:40)  POCT Blood Glucose.: 259 mg/dL (06 Apr 2024 17:32)        Urinalysis Basic - ( 07 Apr 2024 06:13 )    Color: x / Appearance: x / SG: x / pH: x  Gluc: 227 mg/dL / Ketone: x  / Bili: x / Urobili: x   Blood: x / Protein: x / Nitrite: x   Leuk Esterase: x / RBC: x / WBC x   Sq Epi: x / Non Sq Epi: x / Bacteria: x        RADIOLOGY:         HPI: 69-year-old male history of hypertension high cholesterol diabetes CAD status post stents PE on Xarelto presents the emergency department for shortness of breath and cough.  Symptoms started yesterday went to urgent care was placed on doxycycline is taken 3 doses fever and cough getting worse.  Tmax to 103 cough productive of sputum now feeling short of breath exam with diffuse wheezing bilaterally otherwise nonfocal concern for pneumonia not responding to outpatient treatments. He was also found to be in AF, rate ctr. Admit to tele (06 Apr 2024 13:31)    Subjective: Patient seen and examined this AM, feels much better today, no complaints to offer, continued Afib on tele, hemodynamically stable otherwise    REVIEW OF SYSTEMS:    CONSTITUTIONAL: No weakness, fevers or chills  EYES/ENT: No visual changes;  No vertigo or throat pain   NECK: No pain or stiffness  RESPIRATORY: No cough, wheezing, hemoptysis; No shortness of breath  CARDIOVASCULAR: No chest pain or palpitations  GASTROINTESTINAL: No abdominal or epigastric pain. No nausea, vomiting, or hematemesis; No diarrhea or constipation. No melena or hematochezia.  GENITOURINARY: No dysuria, frequency or hematuria  NEUROLOGICAL: No numbness or weakness  SKIN: No itching, rashes      Vital Signs Last 24 Hrs  T(C): 36.8 (07 Apr 2024 07:59), Max: 37.1 (06 Apr 2024 11:34)  T(F): 98.2 (07 Apr 2024 07:59), Max: 98.7 (06 Apr 2024 11:34)  HR: 62 (07 Apr 2024 07:59) (61 - 96)  BP: 135/71 (07 Apr 2024 07:59) (88/- - 142/68)  BP(mean): 87 (06 Apr 2024 15:05) (82 - 87)  RR: 17 (07 Apr 2024 07:59) (16 - 24)  SpO2: 92% (07 Apr 2024 07:59) (92% - 96%)    Parameters below as of 07 Apr 2024 07:59  Patient On (Oxygen Delivery Method): room air      PHYSICAL EXAM:  GENERAL: NAD, lying in bed comfortably  HEAD:  Atraumatic, Normocephalic  EYES: conjunctiva and sclera clear  ENT: Moist mucous membranes  NECK: Supple, No JVD  CHEST/LUNG: Bilateral air entry, + rhonchi bilaterally, Unlabored respirations  HEART: Regular rate and rhythm; No murmurs  ABDOMEN: Bowel sounds present; Soft, Nontender, Nondistended.   EXTREMITIES:  2+ Peripheral Pulses, brisk capillary refill. No clubbing, cyanosis, or edema  NERVOUS SYSTEM:  Alert & Oriented X3, speech clear. No deficits   MSK: FROM all 4 extremities, full and equal strength    MEDICATIONS  (STANDING):  allopurinol 100 milliGRAM(s) Oral daily  atorvastatin 20 milliGRAM(s) Oral at bedtime  azithromycin   Tablet 500 milliGRAM(s) Oral daily  budesonide 160 MICROgram(s)/formoterol 4.5 MICROgram(s) Inhaler 2 Puff(s) Inhalation two times a day  cefepime  Injectable. 2000 milliGRAM(s) IV Push every 8 hours  dextrose 10% Bolus 125 milliLiter(s) IV Bolus once  dextrose 5%. 1000 milliLiter(s) (50 mL/Hr) IV Continuous <Continuous>  dextrose 5%. 1000 milliLiter(s) (100 mL/Hr) IV Continuous <Continuous>  dextrose 50% Injectable 25 Gram(s) IV Push once  dextrose 50% Injectable 12.5 Gram(s) IV Push once  enalapril 20 milliGRAM(s) Oral daily  glucagon  Injectable 1 milliGRAM(s) IntraMuscular once  insulin lispro (ADMELOG) corrective regimen sliding scale   SubCutaneous at bedtime  insulin lispro (ADMELOG) corrective regimen sliding scale   SubCutaneous three times a day before meals  latanoprost 0.005% Ophthalmic Solution 1 Drop(s) Both EYES at bedtime  metoprolol succinate ER 50 milliGRAM(s) Oral daily  rivaroxaban 20 milliGRAM(s) Oral with dinner  triamterene 37.5 mG/hydrochlorothiazide 25 mG Tablet 1 Tablet(s) Oral daily  vancomycin  IVPB 1500 milliGRAM(s) IV Intermittent every 12 hours    MEDICATIONS  (PRN):  acetaminophen     Tablet .. 650 milliGRAM(s) Oral every 6 hours PRN Temp greater or equal to 38C (100.4F), Mild Pain (1 - 3)  albuterol    90 MICROgram(s) HFA Inhaler 2 Puff(s) Inhalation every 6 hours PRN Wheezing  aluminum hydroxide/magnesium hydroxide/simethicone Suspension 30 milliLiter(s) Oral every 4 hours PRN Dyspepsia  dextrose Oral Gel 15 Gram(s) Oral once PRN Blood Glucose LESS THAN 70 milliGRAM(s)/deciliter  diphenhydrAMINE 25 milliGRAM(s) Oral every 8 hours PRN Rash and/or Itching  melatonin 3 milliGRAM(s) Oral at bedtime PRN Insomnia  ondansetron Injectable 4 milliGRAM(s) IV Push every 8 hours PRN Nausea and/or Vomiting        LABS:                          13.0   10.04 )-----------( 159      ( 07 Apr 2024 06:13 )             38.5     07 Apr 2024 06:13    139    |  109    |  17     ----------------------------<  227    4.3     |  22     |  0.81     Ca    8.9        07 Apr 2024 06:13    TPro  7.3    /  Alb  3.2    /  TBili  2.1    /  DBili  x      /  AST  24     /  ALT  21     /  AlkPhos  36     06 Apr 2024 11:51    LIVER FUNCTIONS - ( 06 Apr 2024 11:51 )  Alb: 3.2 g/dL / Pro: 7.3 gm/dL / ALK PHOS: 36 U/L / ALT: 21 U/L / AST: 24 U/L / GGT: x           PT/INR - ( 06 Apr 2024 11:51 )   PT: 13.9 sec;   INR: 1.24 ratio         PTT - ( 06 Apr 2024 11:51 )  PTT:27.3 sec  CAPILLARY BLOOD GLUCOSE      POCT Blood Glucose.: 187 mg/dL (07 Apr 2024 08:40)  POCT Blood Glucose.: 259 mg/dL (06 Apr 2024 17:32)        Urinalysis Basic - ( 07 Apr 2024 06:13 )    Color: x / Appearance: x / SG: x / pH: x  Gluc: 227 mg/dL / Ketone: x  / Bili: x / Urobili: x   Blood: x / Protein: x / Nitrite: x   Leuk Esterase: x / RBC: x / WBC x   Sq Epi: x / Non Sq Epi: x / Bacteria: x        RADIOLOGY:      < from: CT Angio Chest PE Protocol w/ IV Cont (04.06.24 @ 12:59) >    IMPRESSION:  No pulmonary thromboembolism.  Bilateral multi lobar pneumonia.  Small parapneumonic right pleural effusion.  Multiple enlarged mediastinal and hilar lymph nodes, favor   reactive/inflammatory.

## 2024-04-07 NOTE — CONSULT NOTE ADULT - SUBJECTIVE AND OBJECTIVE BOX
Patient is a 69y old  Male who presents with a chief complaint of PNA (07 Apr 2024 11:32)    HPI:  69-year-old male history of hypertension high cholesterol diabetes CAD status post stents PE on Xarelto presents the emergency department for shortness of breath and cough.  Symptoms started yesterday went to urgent care was placed on doxycycline is taken 3 doses fever and cough getting worse.  Tmax to 103 cough productive of sputum now feeling short of breath exam with diffuse wheezing bilaterally otherwise nonfocal concern for pneumonia not responding to outpatient treatments. He was also found to be in AF, rate ctr. Admit to tele (06 Apr 2024 13:31)      PAST MEDICAL & SURGICAL HISTORY:  HLD (hyperlipidemia)      CAD (coronary artery disease)      MI, old  x2- stentx1      HTN (hypertension)      LEA (obstructive sleep apnea)  CPAP      Demyelinating neuropathy      DVT (deep venous thrombosis)  unprovoked DVT/PE- as per patient his mother and sister also had DVT- genetic testing was done but negative      Personal history of PE (pulmonary embolism)      DM (diabetes mellitus)      Osteoarthritis      History of cholecystectomy      H/O Achilles tendon repair      History of percutaneous coronary intervention  RCA stent 2008      S/P IVC filter  7/8/2021      Carpal tunnel syndrome of right wrist  2019          MEDICATIONS  (STANDING):  allopurinol 100 milliGRAM(s) Oral daily  atorvastatin 20 milliGRAM(s) Oral at bedtime  azithromycin   Tablet 500 milliGRAM(s) Oral daily  budesonide 160 MICROgram(s)/formoterol 4.5 MICROgram(s) Inhaler 2 Puff(s) Inhalation two times a day  cefepime  Injectable. 2000 milliGRAM(s) IV Push every 8 hours  dextrose 10% Bolus 125 milliLiter(s) IV Bolus once  dextrose 5%. 1000 milliLiter(s) (50 mL/Hr) IV Continuous <Continuous>  dextrose 5%. 1000 milliLiter(s) (100 mL/Hr) IV Continuous <Continuous>  dextrose 50% Injectable 25 Gram(s) IV Push once  dextrose 50% Injectable 12.5 Gram(s) IV Push once  enalapril 20 milliGRAM(s) Oral daily  glucagon  Injectable 1 milliGRAM(s) IntraMuscular once  insulin lispro (ADMELOG) corrective regimen sliding scale   SubCutaneous three times a day before meals  insulin lispro (ADMELOG) corrective regimen sliding scale   SubCutaneous at bedtime  latanoprost 0.005% Ophthalmic Solution 1 Drop(s) Both EYES at bedtime  metoprolol succinate ER 25 milliGRAM(s) Oral daily  rivaroxaban 20 milliGRAM(s) Oral with dinner  triamterene 37.5 mG/hydrochlorothiazide 25 mG Tablet 1 Tablet(s) Oral daily  vancomycin  IVPB 1500 milliGRAM(s) IV Intermittent every 12 hours    MEDICATIONS  (PRN):  acetaminophen     Tablet .. 650 milliGRAM(s) Oral every 6 hours PRN Temp greater or equal to 38C (100.4F), Mild Pain (1 - 3)  albuterol    90 MICROgram(s) HFA Inhaler 2 Puff(s) Inhalation every 6 hours PRN Wheezing  aluminum hydroxide/magnesium hydroxide/simethicone Suspension 30 milliLiter(s) Oral every 4 hours PRN Dyspepsia  dextrose Oral Gel 15 Gram(s) Oral once PRN Blood Glucose LESS THAN 70 milliGRAM(s)/deciliter  diphenhydrAMINE 25 milliGRAM(s) Oral every 8 hours PRN Rash and/or Itching  melatonin 3 milliGRAM(s) Oral at bedtime PRN Insomnia  ondansetron Injectable 4 milliGRAM(s) IV Push every 8 hours PRN Nausea and/or Vomiting      FAMILY HISTORY:  FH: pancreatic cancer (Father)  family history of DVT - mother and sister- both on coumadin      SOCIAL HISTORY: Denies tobacco, etoh abuse or illicit drug use      REVIEW OF SYSTEMS:    CONSTITUTIONAL:  As per HPI.    T(C): 36.8 (04-07-24 @ 07:59), Max: 36.9 (04-06-24 @ 15:05)  HR: 62 (04-07-24 @ 07:59) (61 - 96)  BP: 135/71 (04-07-24 @ 07:59) (88/- - 142/68)  RR: 17 (04-07-24 @ 07:59) (16 - 19)  SpO2: 92% (04-07-24 @ 07:59) (92% - 96%)    PHYSICAL EXAMINATION:    GENERAL APPEARANCE:  Pt. is not currently dyspneic, in no distress. Pt. is alert, oriented, and pleasant.  HEENT:  Pupils are normal and react normally. No icterus. Mucous membranes well colored.  NECK:  Supple. No lymphadenopathy. Jugular venous pressure not elevated. Carotids equal.   HEART:   regular rate and rhythm. There are no murmurs, rubs or gallops noted  CHEST:  Chest is clear to auscultation. Normal respiratory effort.  ABDOMEN:  Soft and nontender.   EXTREMITIES:  There is no edema, warm and dry.      I&O's Summary      LABS:                        13.0   10.04 )-----------( 159      ( 07 Apr 2024 06:13 )             38.5     04-07    139  |  109<H>  |  17  ----------------------------<  227<H>  4.3   |  22  |  0.81    Ca    8.9      07 Apr 2024 06:13    TPro  7.3  /  Alb  3.2<L>  /  TBili  2.1<H>  /  DBili  x   /  AST  24  /  ALT  21  /  AlkPhos  36<L>  04-06    LIVER FUNCTIONS - ( 06 Apr 2024 11:51 )  Alb: 3.2 g/dL / Pro: 7.3 gm/dL / ALK PHOS: 36 U/L / ALT: 21 U/L / AST: 24 U/L / GGT: x           PT/INR - ( 06 Apr 2024 11:51 )   PT: 13.9 sec;   INR: 1.24 ratio         PTT - ( 06 Apr 2024 11:51 )  PTT:27.3 sec      Urinalysis Basic - ( 07 Apr 2024 06:13 )    Color: x / Appearance: x / SG: x / pH: x  Gluc: 227 mg/dL / Ketone: x  / Bili: x / Urobili: x   Blood: x / Protein: x / Nitrite: x   Leuk Esterase: x / RBC: x / WBC x   Sq Epi: x / Non Sq Epi: x / Bacteria: x     Patient is a 69y old  Male who presents with a chief complaint of PNA (07 Apr 2024 11:32)    HPI:  69-year-old male history of hypertension high cholesterol diabetes CAD status post stents PE on Xarelto presents the emergency department for shortness of breath and cough.  Symptoms started yesterday went to urgent care was placed on doxycycline is taken 3 doses fever and cough getting worse.  Tmax to 103 cough productive of sputum now feeling short of breath exam with diffuse wheezing bilaterally otherwise nonfocal concern for pneumonia not responding to outpatient treatments. He was also found to be in AF, rate ctr. Admit to tele (06 Apr 2024 13:31)    Tele Afib rate controlled 80-90s bpm, some brief RVR to 120s bpm  PAST MEDICAL & SURGICAL HISTORY:  HLD (hyperlipidemia)      CAD (coronary artery disease)      MI, old  x2- stentx1      HTN (hypertension)      LEA (obstructive sleep apnea)  CPAP      Demyelinating neuropathy      DVT (deep venous thrombosis)  unprovoked DVT/PE- as per patient his mother and sister also had DVT- genetic testing was done but negative      Personal history of PE (pulmonary embolism)      DM (diabetes mellitus)      Osteoarthritis      History of cholecystectomy      H/O Achilles tendon repair      History of percutaneous coronary intervention  RCA stent 2008      S/P IVC filter  7/8/2021      Carpal tunnel syndrome of right wrist  2019          MEDICATIONS  (STANDING):  allopurinol 100 milliGRAM(s) Oral daily  atorvastatin 20 milliGRAM(s) Oral at bedtime  azithromycin   Tablet 500 milliGRAM(s) Oral daily  budesonide 160 MICROgram(s)/formoterol 4.5 MICROgram(s) Inhaler 2 Puff(s) Inhalation two times a day  cefepime  Injectable. 2000 milliGRAM(s) IV Push every 8 hours  dextrose 10% Bolus 125 milliLiter(s) IV Bolus once  dextrose 5%. 1000 milliLiter(s) (50 mL/Hr) IV Continuous <Continuous>  dextrose 5%. 1000 milliLiter(s) (100 mL/Hr) IV Continuous <Continuous>  dextrose 50% Injectable 25 Gram(s) IV Push once  dextrose 50% Injectable 12.5 Gram(s) IV Push once  enalapril 20 milliGRAM(s) Oral daily  glucagon  Injectable 1 milliGRAM(s) IntraMuscular once  insulin lispro (ADMELOG) corrective regimen sliding scale   SubCutaneous three times a day before meals  insulin lispro (ADMELOG) corrective regimen sliding scale   SubCutaneous at bedtime  latanoprost 0.005% Ophthalmic Solution 1 Drop(s) Both EYES at bedtime  metoprolol succinate ER 25 milliGRAM(s) Oral daily  rivaroxaban 20 milliGRAM(s) Oral with dinner  triamterene 37.5 mG/hydrochlorothiazide 25 mG Tablet 1 Tablet(s) Oral daily  vancomycin  IVPB 1500 milliGRAM(s) IV Intermittent every 12 hours    MEDICATIONS  (PRN):  acetaminophen     Tablet .. 650 milliGRAM(s) Oral every 6 hours PRN Temp greater or equal to 38C (100.4F), Mild Pain (1 - 3)  albuterol    90 MICROgram(s) HFA Inhaler 2 Puff(s) Inhalation every 6 hours PRN Wheezing  aluminum hydroxide/magnesium hydroxide/simethicone Suspension 30 milliLiter(s) Oral every 4 hours PRN Dyspepsia  dextrose Oral Gel 15 Gram(s) Oral once PRN Blood Glucose LESS THAN 70 milliGRAM(s)/deciliter  diphenhydrAMINE 25 milliGRAM(s) Oral every 8 hours PRN Rash and/or Itching  melatonin 3 milliGRAM(s) Oral at bedtime PRN Insomnia  ondansetron Injectable 4 milliGRAM(s) IV Push every 8 hours PRN Nausea and/or Vomiting      FAMILY HISTORY:  FH: pancreatic cancer (Father)  family history of DVT - mother and sister- both on coumadin      SOCIAL HISTORY: Denies tobacco, etoh abuse or illicit drug use      REVIEW OF SYSTEMS:    CONSTITUTIONAL:  As per HPI.    T(C): 36.8 (04-07-24 @ 07:59), Max: 36.9 (04-06-24 @ 15:05)  HR: 62 (04-07-24 @ 07:59) (61 - 96)  BP: 135/71 (04-07-24 @ 07:59) (88/- - 142/68)  RR: 17 (04-07-24 @ 07:59) (16 - 19)  SpO2: 92% (04-07-24 @ 07:59) (92% - 96%)    PHYSICAL EXAMINATION:    GENERAL APPEARANCE:  Pt. is not currently dyspneic, in no distress. Pt. is alert, oriented, and pleasant.  HEENT:  Pupils are normal and react normally. No icterus. Mucous membranes well colored.  NECK:  Supple. No lymphadenopathy. Jugular venous pressure not elevated. Carotids equal.   HEART:   regular rate and rhythm. There are no murmurs, rubs or gallops noted  CHEST:  Chest is clear to auscultation. Normal respiratory effort.  ABDOMEN:  Soft and nontender.   EXTREMITIES:  There is no edema, warm and dry.      I&O's Summary      LABS:                        13.0   10.04 )-----------( 159      ( 07 Apr 2024 06:13 )             38.5     04-07    139  |  109<H>  |  17  ----------------------------<  227<H>  4.3   |  22  |  0.81    Ca    8.9      07 Apr 2024 06:13    TPro  7.3  /  Alb  3.2<L>  /  TBili  2.1<H>  /  DBili  x   /  AST  24  /  ALT  21  /  AlkPhos  36<L>  04-06    LIVER FUNCTIONS - ( 06 Apr 2024 11:51 )  Alb: 3.2 g/dL / Pro: 7.3 gm/dL / ALK PHOS: 36 U/L / ALT: 21 U/L / AST: 24 U/L / GGT: x           PT/INR - ( 06 Apr 2024 11:51 )   PT: 13.9 sec;   INR: 1.24 ratio         PTT - ( 06 Apr 2024 11:51 )  PTT:27.3 sec      Urinalysis Basic - ( 07 Apr 2024 06:13 )    Color: x / Appearance: x / SG: x / pH: x  Gluc: 227 mg/dL / Ketone: x  / Bili: x / Urobili: x   Blood: x / Protein: x / Nitrite: x   Leuk Esterase: x / RBC: x / WBC x   Sq Epi: x / Non Sq Epi: x / Bacteria: x

## 2024-04-07 NOTE — CONSULT NOTE ADULT - SUBJECTIVE AND OBJECTIVE BOX
Patient is a 69y old  Male who presents with a chief complaint of PNA (2024 10:32)    HPI:  69-year-old male history of hypertension high cholesterol diabetes CAD status post stents PE on Xarelto presents the emergency department for shortness of breath and cough.  Symptoms started day prior to admit- went to urgent care was placed on doxycycline is taken 3 doses fever and cough getting worse.  Tmax to 103 cough productive of sputum now feeling short of breath exam with diffuse wheezing bilaterally otherwise nonfocal concern for pneumonia not responding to outpatient treatments. He was also found to be in AF, rate ctr. Admit to tele. Imaging shows  No pulmonary thromboembolism. Bilateral multi lobar pneumonia. Small parapneumonic right pleural effusion. Multiple enlarged mediastinal and hilar lymph nodes, favor reactive/inflammatory. Started on IV vancomycin/cefepime/azithromycin.     PMH: as above  PSH: as above    Meds: per reconciliation sheet, noted below  MEDICATIONS  (STANDING):  allopurinol 100 milliGRAM(s) Oral daily  atorvastatin 20 milliGRAM(s) Oral at bedtime  budesonide 160 MICROgram(s)/formoterol 4.5 MICROgram(s) Inhaler 2 Puff(s) Inhalation two times a day  cefepime  Injectable. 2000 milliGRAM(s) IV Push every 8 hours  dextrose 10% Bolus 125 milliLiter(s) IV Bolus once  dextrose 5%. 1000 milliLiter(s) (50 mL/Hr) IV Continuous <Continuous>  dextrose 5%. 1000 milliLiter(s) (100 mL/Hr) IV Continuous <Continuous>  dextrose 50% Injectable 25 Gram(s) IV Push once  dextrose 50% Injectable 12.5 Gram(s) IV Push once  enalapril 20 milliGRAM(s) Oral at bedtime  glucagon  Injectable 1 milliGRAM(s) IntraMuscular once  insulin lispro (ADMELOG) corrective regimen sliding scale   SubCutaneous at bedtime  insulin lispro (ADMELOG) corrective regimen sliding scale   SubCutaneous three times a day before meals  latanoprost 0.005% Ophthalmic Solution 1 Drop(s) Both EYES at bedtime  metoprolol succinate ER 25 milliGRAM(s) Oral daily  rivaroxaban 20 milliGRAM(s) Oral with dinner  triamterene 37.5 mG/hydrochlorothiazide 25 mG Tablet 1 Tablet(s) Oral daily  vancomycin  IVPB 1500 milliGRAM(s) IV Intermittent every 12 hours    Allergies    No Known Allergies    Intolerances      Social: no smoking, no alcohol, no illegal drugs; no recent travel, no exposure to TB  FAMILY HISTORY:  FH: pancreatic cancer (Father)  family history of DVT - mother and sister- both on coumadin       no history of premature cardiovascular disease in first degree relatives    ROS: the patient denies fever, no chills, no HA, no dizziness, no sore throat, no blurry vision, no CP, no palpitations, + SOB, + cough, no abdominal pain, no diarrhea, no N/V, no dysuria, no leg pain, no claudication, no rash, no joint aches, no rectal pain or bleeding, no night sweats    All other systems reviewed and are negative    Vital Signs Last 24 Hrs  T(C): 36.8 (2024 07:59), Max: 37.1 (2024 11:34)  T(F): 98.2 (2024 07:59), Max: 98.7 (2024 11:34)  HR: 62 (2024 07:59) (61 - 96)  BP: 135/71 (2024 07:59) (88/- - 142/68)  BP(mean): 87 (2024 15:05) (82 - 87)  RR: 17 (2024 07:59) (16 - 24)  SpO2: 92% (2024 07:59) (92% - 96%)    Parameters below as of 2024 07:59  Patient On (Oxygen Delivery Method): room air      Daily     Daily Weight in k (2024 17:30)    PE:  Constitutional: NAD   HEENT: NC/AT, EOMI, PERRLA, conjunctivae clear; ears and nose atraumatic; pharynx benign  Neck: supple; thyroid not palpable  Back: no tenderness  Respiratory: decreased breath sounds, rhonchi   Cardiovascular: S1S2 regular, no murmurs  Abdomen: soft, not tender, not distended, positive BS; liver and spleen WNL  Genitourinary: no suprapubic tenderness  Lymphatic: no LN palpable  Musculoskeletal: no muscle tenderness, no joint swelling or tenderness  Extremities: no pedal edema  Neurological/ Psychiatric: AxOx3, Judgement and insight normal;  moving all extremities  Skin: no rashes; no palpable lesions    Labs: all available labs reviewed                        13.0   10.04 )-----------( 159      ( 2024 06:13 )             38.5     04-    139  |  109<H>  |  17  ----------------------------<  227<H>  4.3   |  22  |  0.81    Ca    8.9      2024 06:13    TPro  7.3  /  Alb  3.2<L>  /  TBili  2.1<H>  /  DBili  x   /  AST  24  /  ALT  21  /  AlkPhos  36<L>  -     LIVER FUNCTIONS - ( 2024 11:51 )  Alb: 3.2 g/dL / Pro: 7.3 gm/dL / ALK PHOS: 36 U/L / ALT: 21 U/L / AST: 24 U/L / GGT: x           Urinalysis Basic - ( 2024 06:13 )    Color: x / Appearance: x / SG: x / pH: x  Gluc: 227 mg/dL / Ketone: x  / Bili: x / Urobili: x   Blood: x / Protein: x / Nitrite: x   Leuk Esterase: x / RBC: x / WBC x   Sq Epi: x / Non Sq Epi: x / Bacteria: x          Radiology: all available radiological tests reviewed  < from: CT Angio Chest PE Protocol w/ IV Cont (24 @ 12:59) >    ACC: [id number] EXAM:  [facility]ST [facility]   ORDERED BY: LUIS SOTO     PROCEDURE DATE:  2024          INTERPRETATION:  CLINICAL INFORMATION: Shortness of breath and cough,   worsening fever and cough, productive sputum. History ofpulmonary   embolism.    COMPARISON: CT pulmonary injury of 2023. CT pulmonary   angiogram 2019.    CONTRAST/COMPLICATIONS:  IV Contrast: Omnipaque 350  90 cc administered   0 cc discarded  Oral Contrast: NONE  Complications: None reported at time of study completion    PROCEDURE:  CT Angiography of the Chest.  Sagittal and coronal reformats were performed as well as 3D (MIP)   reconstructions.    FINDINGS:    LUNGS AND AIRWAYS: Patent central airways.  Bilateral multi lobar   peribronchial consolidative opacities, worse in right lower lobe with   diffuse right lower lobar interlobular septal thickening, peribronchial   nodular opacities present in all lobes with diffuse peribronchial wall   thickening.  PLEURA: Small right pleural effusion.  MEDIASTINUM AND KENDALL: Few enlarged mediastinal and hilar nodes, for   example right inferior hilar 1.7 x 1.6 cm and right paratracheal 1.9 x   1.2 cm.  VESSELS: No evidence of pulmonary thromboembolism. Coronary artery   stenting and calcifications. Aortic atherosclerotic changes. No aortic   aneurysm.  HEART: Heart size is normal. No pericardial effusion.  CHEST WALL AND LOWER NECK: Within normal limits.  VISUALIZED UPPER ABDOMEN: Cholecystectomy. Gastric fundal diverticulum.  BONES: Degenerative changes.    IMPRESSION:  No pulmonary thromboembolism.  Bilateral multi lobar pneumonia.  Small parapneumonic right pleural effusion.  Multiple enlarged mediastinal and hilar lymph nodes, favor   reactive/inflammatory.    --- End of Report ---      < end of copied text >    Advanced directives addressed: full resuscitation

## 2024-04-07 NOTE — PROGRESS NOTE ADULT - ASSESSMENT
* GNR PNA- bilateral, multifocal  Cefepime and Vanco  consult ID  nebs PRN  pt seems to have an erythematous rash after meds given in ed; monitor    * New onset AF  probably sec to fever, PNA  tele monitor  if still in AF in AM consult cardio  c/w Eliquis and BB  check TTE in am if still in AF    * Hyperglycemia sec to  diabetes  sliding scale    * H/O PE, CAD  c/w AC   69-year-old male history of hypertension high cholesterol diabetes CAD status post stents PE on Xarelto presents the emergency department for shortness of breath and cough. Symptoms started yesterday went to urgent care was placed on doxycycline is taken 3 doses fever and cough getting worse.  Tmax to 103 cough productive of sputum now feeling short of breath exam with diffuse wheezing bilaterally otherwise nonfocal concern for pneumonia not responding to outpatient treatments. He was also found to be in AF, rate ctr. Admit to tele (06 Apr 2024 13:31)      Multifocal pneumonia, suspect GNR PNA  -CT chest as above  -Continue Cefepime and Vanco  -ID consult appreciated, added azithromycin   -Pulm consult appreciated   -Breathing well on RA, afebrile   -Supportive care     New onset AF  -[id number]: 6 (age, DM, HTN, thromboembolism, MI)  -continue Xarelto   -EP consult noted, increased metoprolol ER to 50mg daily, plan for DCCV on monday if still remains in AF  -TTE ordered   -Continue remote tele     Diabetes Mellitus type 2   -Hold home medications: metformin   -Hypoglycemia Protocol, ISS, Accuchecks AC&HS.  -Diet: Consistent Carbs w/ Evening Snack  -Follow up HbA1c    HTN/HLD/CAD  -Continue home meds     H/O PE, CAD, IVF filter   -Continue Xarelto     VTE ppx: on Xarelto

## 2024-04-07 NOTE — CONSULT NOTE ADULT - SUBJECTIVE AND OBJECTIVE BOX
Patient is a 69y old  Male who presents with a chief complaint of PNA (07 Apr 2024 09:13)      HPI:  69-year-old male history of hypertension high cholesterol diabetes CAD status post stents PE on Xarelto presents the emergency department for shortness of breath and cough.  Symptoms started so he went to urgent care was placed on doxycycline is taken 3 doses fever and cough getting worse.  Tmax to 103 cough productive of sputum now feeling short of breath exam with diffuse wheezing bilaterally otherwise nonfocal concern for pneumonia not responding to outpatient treatments. He was also found to be in AF, rate ctr. Admit to tele (06 Apr 2024 13:31)  Called our answering service, was recommended to go to the ER  CTA performed- Patent central airways.  Bilateral multi lobar  peribronchial consolidative opacities, worse in right lower lobe with   diffuse right lower lobar interlobular septal thickening, peribronchial nodular opacities present in all lobes with diffuse peribronchial wall   thickening. Reactive adenopathy noted as well as a small right sided pleural effusion  he has a history of LEA, cleans the CPAP very well  Mild asthma- only on albuterol PRN at home       PAST MEDICAL & SURGICAL HISTORY:  HLD (hyperlipidemia)      CAD (coronary artery disease)      MI, old  x2- stentx1      HTN (hypertension)      LEA (obstructive sleep apnea)  CPAP      Demyelinating neuropathy      DVT (deep venous thrombosis)  unprovoked DVT/PE- as per patient his mother and sister also had DVT- genetic testing was done but negative      Personal history of PE (pulmonary embolism)      DM (diabetes mellitus)      Osteoarthritis      History of cholecystectomy      H/O Achilles tendon repair      History of percutaneous coronary intervention  RCA stent 2008      S/P IVC filter  7/8/2021      Carpal tunnel syndrome of right wrist  2019          PREVIOUS DIAGNOSTIC TESTING:      MEDICATIONS  (STANDING):  allopurinol 100 milliGRAM(s) Oral daily  atorvastatin 20 milliGRAM(s) Oral at bedtime  cefepime  Injectable. 2000 milliGRAM(s) IV Push every 8 hours  dextrose 10% Bolus 125 milliLiter(s) IV Bolus once  dextrose 5%. 1000 milliLiter(s) (50 mL/Hr) IV Continuous <Continuous>  dextrose 5%. 1000 milliLiter(s) (100 mL/Hr) IV Continuous <Continuous>  dextrose 50% Injectable 25 Gram(s) IV Push once  dextrose 50% Injectable 12.5 Gram(s) IV Push once  enalapril 20 milliGRAM(s) Oral at bedtime  glucagon  Injectable 1 milliGRAM(s) IntraMuscular once  insulin lispro (ADMELOG) corrective regimen sliding scale   SubCutaneous at bedtime  insulin lispro (ADMELOG) corrective regimen sliding scale   SubCutaneous three times a day before meals  latanoprost 0.005% Ophthalmic Solution 1 Drop(s) Both EYES at bedtime  metoprolol succinate ER 25 milliGRAM(s) Oral daily  rivaroxaban 20 milliGRAM(s) Oral with dinner  triamterene 37.5 mG/hydrochlorothiazide 25 mG Tablet 1 Tablet(s) Oral daily  vancomycin  IVPB 1500 milliGRAM(s) IV Intermittent every 12 hours    MEDICATIONS  (PRN):  acetaminophen     Tablet .. 650 milliGRAM(s) Oral every 6 hours PRN Temp greater or equal to 38C (100.4F), Mild Pain (1 - 3)  aluminum hydroxide/magnesium hydroxide/simethicone Suspension 30 milliLiter(s) Oral every 4 hours PRN Dyspepsia  dextrose Oral Gel 15 Gram(s) Oral once PRN Blood Glucose LESS THAN 70 milliGRAM(s)/deciliter  diphenhydrAMINE 25 milliGRAM(s) Oral every 8 hours PRN Rash and/or Itching  melatonin 3 milliGRAM(s) Oral at bedtime PRN Insomnia  ondansetron Injectable 4 milliGRAM(s) IV Push every 8 hours PRN Nausea and/or Vomiting      FAMILY HISTORY:  FH: pancreatic cancer (Father)  family history of DVT - mother and sister- both on coumadin        SOCIAL HISTORY:  ***    REVIEW OF SYSTEM:  Pertinent items are noted in HPI.    Vital Signs Last 24 Hrs  T(C): 36.8 (07 Apr 2024 07:59), Max: 37.1 (06 Apr 2024 11:34)  T(F): 98.2 (07 Apr 2024 07:59), Max: 98.7 (06 Apr 2024 11:34)  HR: 62 (07 Apr 2024 07:59) (61 - 96)  BP: 135/71 (07 Apr 2024 07:59) (88/- - 142/68)  BP(mean): 87 (06 Apr 2024 15:05) (82 - 87)  RR: 17 (07 Apr 2024 07:59) (16 - 24)  SpO2: 92% (07 Apr 2024 07:59) (92% - 96%)    Parameters below as of 07 Apr 2024 07:59  Patient On (Oxygen Delivery Method): room air        I&O's Summary    PHYSICAL EXAM  General Appearance: cooperative, no acute distress,   HEENT: PERRL, conjunctiva clear, EOM's intact, non injected pharynx, no exudate, TM   normal  Neck: Supple, , no adenopathy, thyroid: not enlarged, no carotid bruit or JVD  Back: Symmetric, no  tenderness,no soft tissue tenderness  Lungs: coarse at the bases   Heart: Regular rate and rhythm, S1, S2 normal, no murmur, rub or gallop  Abdomen: Soft, non-tender, bowel sounds active , no hepatosplenomegaly  Extremities: no cyanosis or edema, no joint swelling  Skin: Skin color, texture normal, no rashes   Neurologic: Alert and oriented X3 , cranial nerves intact, sensory and motor normal,    ECG:    LABS:                          13.0   10.04 )-----------( 159      ( 07 Apr 2024 06:13 )             38.5     04-07    139  |  109<H>  |  17  ----------------------------<  227<H>  4.3   |  22  |  0.81    Ca    8.9      07 Apr 2024 06:13    TPro  7.3  /  Alb  3.2<L>  /  TBili  2.1<H>  /  DBili  x   /  AST  24  /  ALT  21  /  AlkPhos  36<L>  04-06            PT/INR - ( 06 Apr 2024 11:51 )   PT: 13.9 sec;   INR: 1.24 ratio         PTT - ( 06 Apr 2024 11:51 )  PTT:27.3 sec  Urinalysis Basic - ( 07 Apr 2024 06:13 )    Color: x / Appearance: x / SG: x / pH: x  Gluc: 227 mg/dL / Ketone: x  / Bili: x / Urobili: x   Blood: x / Protein: x / Nitrite: x   Leuk Esterase: x / RBC: x / WBC x   Sq Epi: x / Non Sq Epi: x / Bacteria: x            RADIOLOGY & ADDITIONAL STUDIES:

## 2024-04-08 LAB
ANION GAP SERPL CALC-SCNC: 7 MMOL/L — SIGNIFICANT CHANGE UP (ref 5–17)
BASOPHILS # BLD AUTO: 0.02 K/UL — SIGNIFICANT CHANGE UP (ref 0–0.2)
BASOPHILS NFR BLD AUTO: 0.3 % — SIGNIFICANT CHANGE UP (ref 0–2)
BUN SERPL-MCNC: 20 MG/DL — SIGNIFICANT CHANGE UP (ref 7–23)
CALCIUM SERPL-MCNC: 9 MG/DL — SIGNIFICANT CHANGE UP (ref 8.5–10.1)
CHLORIDE SERPL-SCNC: 107 MMOL/L — SIGNIFICANT CHANGE UP (ref 96–108)
CO2 SERPL-SCNC: 25 MMOL/L — SIGNIFICANT CHANGE UP (ref 22–31)
CREAT SERPL-MCNC: 0.92 MG/DL — SIGNIFICANT CHANGE UP (ref 0.5–1.3)
CULTURE RESULTS: NO GROWTH — SIGNIFICANT CHANGE UP
EGFR: 90 ML/MIN/1.73M2 — SIGNIFICANT CHANGE UP
EOSINOPHIL # BLD AUTO: 0.08 K/UL — SIGNIFICANT CHANGE UP (ref 0–0.5)
EOSINOPHIL NFR BLD AUTO: 1 % — SIGNIFICANT CHANGE UP (ref 0–6)
GLUCOSE SERPL-MCNC: 172 MG/DL — HIGH (ref 70–99)
HCT VFR BLD CALC: 41.9 % — SIGNIFICANT CHANGE UP (ref 39–50)
HGB BLD-MCNC: 13.9 G/DL — SIGNIFICANT CHANGE UP (ref 13–17)
IMM GRANULOCYTES NFR BLD AUTO: 0.4 % — SIGNIFICANT CHANGE UP (ref 0–0.9)
LYMPHOCYTES # BLD AUTO: 1.36 K/UL — SIGNIFICANT CHANGE UP (ref 1–3.3)
LYMPHOCYTES # BLD AUTO: 17.8 % — SIGNIFICANT CHANGE UP (ref 13–44)
MCHC RBC-ENTMCNC: 30.2 PG — SIGNIFICANT CHANGE UP (ref 27–34)
MCHC RBC-ENTMCNC: 33.2 GM/DL — SIGNIFICANT CHANGE UP (ref 32–36)
MCV RBC AUTO: 91.1 FL — SIGNIFICANT CHANGE UP (ref 80–100)
MONOCYTES # BLD AUTO: 0.53 K/UL — SIGNIFICANT CHANGE UP (ref 0–0.9)
MONOCYTES NFR BLD AUTO: 6.9 % — SIGNIFICANT CHANGE UP (ref 2–14)
NEUTROPHILS # BLD AUTO: 5.63 K/UL — SIGNIFICANT CHANGE UP (ref 1.8–7.4)
NEUTROPHILS NFR BLD AUTO: 73.6 % — SIGNIFICANT CHANGE UP (ref 43–77)
PLATELET # BLD AUTO: 203 K/UL — SIGNIFICANT CHANGE UP (ref 150–400)
POTASSIUM SERPL-MCNC: 4.1 MMOL/L — SIGNIFICANT CHANGE UP (ref 3.5–5.3)
POTASSIUM SERPL-SCNC: 4.1 MMOL/L — SIGNIFICANT CHANGE UP (ref 3.5–5.3)
RBC # BLD: 4.6 M/UL — SIGNIFICANT CHANGE UP (ref 4.2–5.8)
RBC # FLD: 14.8 % — HIGH (ref 10.3–14.5)
SODIUM SERPL-SCNC: 139 MMOL/L — SIGNIFICANT CHANGE UP (ref 135–145)
SPECIMEN SOURCE: SIGNIFICANT CHANGE UP
VANCOMYCIN TROUGH SERPL-MCNC: 14.3 UG/ML — SIGNIFICANT CHANGE UP (ref 10–20)
WBC # BLD: 7.65 K/UL — SIGNIFICANT CHANGE UP (ref 3.8–10.5)
WBC # FLD AUTO: 7.65 K/UL — SIGNIFICANT CHANGE UP (ref 3.8–10.5)

## 2024-04-08 PROCEDURE — 92960 CARDIOVERSION ELECTRIC EXT: CPT

## 2024-04-08 PROCEDURE — 93306 TTE W/DOPPLER COMPLETE: CPT | Mod: 26

## 2024-04-08 PROCEDURE — 99233 SBSQ HOSP IP/OBS HIGH 50: CPT | Mod: 25

## 2024-04-08 PROCEDURE — 93010 ELECTROCARDIOGRAM REPORT: CPT

## 2024-04-08 PROCEDURE — 99232 SBSQ HOSP IP/OBS MODERATE 35: CPT

## 2024-04-08 RX ADMIN — BUDESONIDE AND FORMOTEROL FUMARATE DIHYDRATE 2 PUFF(S): 160; 4.5 AEROSOL RESPIRATORY (INHALATION) at 20:08

## 2024-04-08 RX ADMIN — Medication 250 MILLIGRAM(S): at 21:41

## 2024-04-08 RX ADMIN — Medication 100 MILLIGRAM(S): at 09:03

## 2024-04-08 RX ADMIN — Medication 20 MILLIGRAM(S): at 09:04

## 2024-04-08 RX ADMIN — Medication 50 MILLIGRAM(S): at 09:04

## 2024-04-08 RX ADMIN — Medication 2: at 12:13

## 2024-04-08 RX ADMIN — CEFEPIME 2000 MILLIGRAM(S): 1 INJECTION, POWDER, FOR SOLUTION INTRAMUSCULAR; INTRAVENOUS at 15:43

## 2024-04-08 RX ADMIN — LATANOPROST 1 DROP(S): 0.05 SOLUTION/ DROPS OPHTHALMIC; TOPICAL at 22:05

## 2024-04-08 RX ADMIN — Medication 250 MILLIGRAM(S): at 09:08

## 2024-04-08 RX ADMIN — Medication 1 TABLET(S): at 09:04

## 2024-04-08 RX ADMIN — BUDESONIDE AND FORMOTEROL FUMARATE DIHYDRATE 2 PUFF(S): 160; 4.5 AEROSOL RESPIRATORY (INHALATION) at 09:32

## 2024-04-08 RX ADMIN — AZITHROMYCIN 500 MILLIGRAM(S): 500 TABLET, FILM COATED ORAL at 09:04

## 2024-04-08 RX ADMIN — ATORVASTATIN CALCIUM 20 MILLIGRAM(S): 80 TABLET, FILM COATED ORAL at 21:21

## 2024-04-08 RX ADMIN — Medication 2: at 09:03

## 2024-04-08 RX ADMIN — Medication 0: at 21:20

## 2024-04-08 RX ADMIN — CEFEPIME 2000 MILLIGRAM(S): 1 INJECTION, POWDER, FOR SOLUTION INTRAMUSCULAR; INTRAVENOUS at 05:02

## 2024-04-08 RX ADMIN — CEFEPIME 2000 MILLIGRAM(S): 1 INJECTION, POWDER, FOR SOLUTION INTRAMUSCULAR; INTRAVENOUS at 21:21

## 2024-04-08 NOTE — PROGRESS NOTE ADULT - ASSESSMENT
69-year-old male history of hypertension high cholesterol diabetes CAD status post stents PE on Xarelto p/w cough dyspnea found to have multilobe PNA on CT and enlarged mediastinal lymph nodes and newly dx of AF    #PNA cont abx and care per primary team, pt feeling better  #AF w/RVR, dyspnea on exertion,    -cont xarelto 20mg qD for stroke prevention elevated chadsvasc   -TTE  -increase toprolol 50mg qD  -keep pt NPO for DCCV today  -encouraged weight loss, control HTN  - Pt is compliant with CPAP for LEA  Plan d/w pt//RN           69-year-old male history of hypertension high cholesterol diabetes CAD status post stents PE on Xarelto p/w cough dyspnea found to have multilobe PNA on CT and enlarged mediastinal lymph nodes and newly dx of AF    #PNA cont abx and care per primary team, pt feeling better  #AF w/RVR, dyspnea on exertion,    -cont xarelto 20mg qD for stroke prevention elevated chadsvasc   -TTE  -increase toprolol 50mg qD  -keep pt NPO for DCCV today  -encouraged weight loss, control HTN  - Pt is compliant with CPAP for LEA  - recommend zio patch for 2 weeks upon d/c  & f/u with  in 4 weeks  Plan d/w pt//RN           69-year-old male history of hypertension high cholesterol diabetes CAD status post stents PE on Xarelto p/w cough dyspnea found to have multilobe PNA on CT and enlarged mediastinal lymph nodes and newly dx of AF  TTE 4.2024 shows LVEF 60% mild LAE.     #PNA cont abx and care per primary team, pt feeling better  #AF w/RVR, dyspnea on exertion,    -s/p dccv SR  -cont xarelto 20mg qD for stroke prevention elevated chadsvasc   -cont toprolol 50mg qD  -encouraged weight loss, control HTN  - Pt is compliant with CPAP for LEA  - recommend zio patch for 2 weeks upon d/c  & f/u with  in 4 weeks  Plan d/w pt//RN

## 2024-04-08 NOTE — PROGRESS NOTE ADULT - SUBJECTIVE AND OBJECTIVE BOX
Date of service: 04-08-24 @ 11:26    pt seen and examined  has cough  has dyspnea  afebrile    ROS: no fever or chills; denies dizziness, no HA, no abdominal pain, no diarrhea or constipation; no dysuria, no urinary frequency, no legs pain, no rashes    MEDICATIONS  (STANDING):  allopurinol 100 milliGRAM(s) Oral daily  atorvastatin 20 milliGRAM(s) Oral at bedtime  azithromycin   Tablet 500 milliGRAM(s) Oral daily  budesonide 160 MICROgram(s)/formoterol 4.5 MICROgram(s) Inhaler 2 Puff(s) Inhalation two times a day  cefepime  Injectable. 2000 milliGRAM(s) IV Push every 8 hours  dextrose 10% Bolus 125 milliLiter(s) IV Bolus once  dextrose 5%. 1000 milliLiter(s) (50 mL/Hr) IV Continuous <Continuous>  dextrose 5%. 1000 milliLiter(s) (100 mL/Hr) IV Continuous <Continuous>  dextrose 50% Injectable 25 Gram(s) IV Push once  dextrose 50% Injectable 12.5 Gram(s) IV Push once  enalapril 20 milliGRAM(s) Oral daily  glucagon  Injectable 1 milliGRAM(s) IntraMuscular once  insulin lispro (ADMELOG) corrective regimen sliding scale   SubCutaneous at bedtime  insulin lispro (ADMELOG) corrective regimen sliding scale   SubCutaneous three times a day before meals  latanoprost 0.005% Ophthalmic Solution 1 Drop(s) Both EYES at bedtime  metoprolol succinate ER 50 milliGRAM(s) Oral daily  rivaroxaban 20 milliGRAM(s) Oral with dinner  triamterene 37.5 mG/hydrochlorothiazide 25 mG Tablet 1 Tablet(s) Oral daily  vancomycin  IVPB 1500 milliGRAM(s) IV Intermittent every 12 hours    Vital Signs Last 24 Hrs  T(C): 36 (08 Apr 2024 07:46), Max: 37.2 (07 Apr 2024 15:51)  T(F): 96.8 (08 Apr 2024 07:46), Max: 99 (07 Apr 2024 15:51)  HR: 74 (08 Apr 2024 09:32) (65 - 77)  BP: 125/72 (08 Apr 2024 07:46) (125/72 - 135/89)  BP(mean): --  RR: 18 (08 Apr 2024 07:46) (18 - 18)  SpO2: 93% (08 Apr 2024 09:32) (92% - 93%)    Parameters below as of 08 Apr 2024 09:32  Patient On (Oxygen Delivery Method): room air      PE:  Constitutional: NAD   HEENT: NC/AT, EOMI, PERRLA, conjunctivae clear; ears and nose atraumatic; pharynx benign  Neck: supple; thyroid not palpable  Back: no tenderness  Respiratory: decreased breath sounds, rhonchi   Cardiovascular: S1S2 regular, no murmurs  Abdomen: soft, not tender, not distended, positive BS; liver and spleen WNL  Genitourinary: no suprapubic tenderness  Lymphatic: no LN palpable  Musculoskeletal: no muscle tenderness, no joint swelling or tenderness  Extremities: no pedal edema  Neurological/ Psychiatric: AxOx3, Judgement and insight normal;  moving all extremities  Skin: no rashes; no palpable lesions    Labs: all available labs reviewed                        13.9   7.65  )-----------( 203      ( 08 Apr 2024 06:37 )             41.9     04-08    139  |  107  |  20  ----------------------------<  172<H>  4.1   |  25  |  0.92    Ca    9.0      08 Apr 2024 06:37  Phos  2.4     04-07  Mg     2.0     04-07    TPro  7.3  /  Alb  3.2<L>  /  TBili  2.1<H>  /  DBili  x   /  AST  24  /  ALT  21  /  AlkPhos  36<L>  04-06        Urinalysis Basic - ( 07 Apr 2024 06:13 )    Color: x / Appearance: x / SG: x / pH: x  Gluc: 227 mg/dL / Ketone: x  / Bili: x / Urobili: x   Blood: x / Protein: x / Nitrite: x   Leuk Esterase: x / RBC: x / WBC x   Sq Epi: x / Non Sq Epi: x / Bacteria: x      Culture - Sputum . (04.07.24 @ 17:05)   Gram Stain:   Rare polymorphonuclear leukocytes per low power field   No Squamous epithelial cells per low power field   No organisms seen per oil power field  Specimen Source: .Sputum Sputum  Culture - Blood (04.06.24 @ 12:05)   Specimen Source: .Blood None  Culture Results:   No growth at 24 hours  Culture - Blood (04.06.24 @ 11:51)   Specimen Source: .Blood None  Culture Results:   No growth at 24 hours    Radiology: all available radiological tests reviewed      ACC: [id number] EXAM:  CT ANGIO CHEST PULM ART [facility]   ORDERED BY: LUIS SOTO     PROCEDURE DATE:  04/06/2024          INTERPRETATION:  CLINICAL INFORMATION: Shortness of breath and cough,   worsening fever and cough, productive sputum. History ofpulmonary   embolism.    COMPARISON: CT pulmonary injury of August 24, 2023. CT pulmonary   angiogram October 26, 2019.    CONTRAST/COMPLICATIONS:  IV Contrast: Omnipaque 350  90 cc administered   0 cc discarded  Oral Contrast: NONE  Complications: None reported at time of study completion    PROCEDURE:  CT Angiography of the Chest.  Sagittal and coronal reformats were performed as well as 3D (MIP)   reconstructions.    FINDINGS:    LUNGS AND AIRWAYS: Patent central airways.  Bilateral multi lobar   peribronchial consolidative opacities, worse in right lower lobe with   diffuse right lower lobar interlobular septal thickening, peribronchial   nodular opacities present in all lobes with diffuse peribronchial wall   thickening.  PLEURA: Small right pleural effusion.  MEDIASTINUM AND KENDALL: Few enlarged mediastinal and hilar nodes, for   example right inferior hilar 1.7 x 1.6 cm and right paratracheal 1.9 x   1.2 cm.  VESSELS: No evidence of pulmonary thromboembolism. Coronary artery   stenting and calcifications. Aortic atherosclerotic changes. No aortic   aneurysm.  HEART: Heart size is normal. No pericardial effusion.  CHEST WALL AND LOWER NECK: Within normal limits.  VISUALIZED UPPER ABDOMEN: Cholecystectomy. Gastric fundal diverticulum.  BONES: Degenerative changes.    IMPRESSION:  No pulmonary thromboembolism.  Bilateral multi lobar pneumonia.  Small parapneumonic right pleural effusion.  Multiple enlarged mediastinal and hilar lymph nodes, favor   reactive/inflammatory.        Advanced directives addressed: full resuscitation

## 2024-04-08 NOTE — PROGRESS NOTE ADULT - SUBJECTIVE AND OBJECTIVE BOX
HPI: 69-year-old male history of hypertension high cholesterol diabetes CAD status post stents PE on Xarelto presents the emergency department for shortness of breath and cough.  Symptoms started yesterday went to urgent care was placed on doxycycline is taken 3 doses fever and cough getting worse.  Tmax to 103 cough productive of sputum now feeling short of breath exam with diffuse wheezing bilaterally otherwise nonfocal concern for pneumonia not responding to outpatient treatments. He was also found to be in AF, rate ctr. Admit to tele (06 Apr 2024 13:31)    Subjective: Patient seen and examined today, feels better, no overnight issues reported, continued Afib, s/p cardioversion today, VSS.     REVIEW OF SYSTEMS:    CONSTITUTIONAL: No weakness, fevers or chills  EYES/ENT: No visual changes;  No vertigo or throat pain   NECK: No pain or stiffness  RESPIRATORY: No cough, wheezing, hemoptysis; No shortness of breath  CARDIOVASCULAR: No chest pain or palpitations  GASTROINTESTINAL: No abdominal or epigastric pain. No nausea, vomiting, or hematemesis; No diarrhea or constipation. No melena or hematochezia.  GENITOURINARY: No dysuria, frequency or hematuria  NEUROLOGICAL: No numbness or weakness  SKIN: No itching, rashes      Vital Signs Last 24 Hrs  T(C): 36.7 (08 Apr 2024 15:30), Max: 37 (07 Apr 2024 21:20)  T(F): 98.1 (08 Apr 2024 15:30), Max: 98.6 (07 Apr 2024 21:20)  HR: 78 (08 Apr 2024 15:30) (65 - 92)  BP: 131/82 (08 Apr 2024 15:30) (105/82 - 152/88)  RR: 18 (08 Apr 2024 15:30) (18 - 20)  SpO2: 93% (08 Apr 2024 15:30) (92% - 95%)    Parameters below as of 08 Apr 2024 15:30  Patient On (Oxygen Delivery Method): room air    PHYSICAL EXAM:  GENERAL: NAD, lying in bed comfortably  HEAD:  Atraumatic, Normocephalic  EYES: conjunctiva and sclera clear  ENT: Moist mucous membranes  NECK: Supple, No JVD  CHEST/LUNG: Bilateral air entry, + rhonchi bilaterally, Unlabored respirations  HEART: Regular rate and rhythm; No murmurs  ABDOMEN: Bowel sounds present; Soft, Nontender, Nondistended.   EXTREMITIES:  2+ Peripheral Pulses, brisk capillary refill. No clubbing, cyanosis, or edema  NERVOUS SYSTEM:  Alert & Oriented X3, speech clear. No deficits   MSK: FROM all 4 extremities, full and equal strength    MEDICATIONS  (STANDING):  allopurinol 100 milliGRAM(s) Oral daily  atorvastatin 20 milliGRAM(s) Oral at bedtime  azithromycin   Tablet 500 milliGRAM(s) Oral daily  budesonide 160 MICROgram(s)/formoterol 4.5 MICROgram(s) Inhaler 2 Puff(s) Inhalation two times a day  cefepime  Injectable. 2000 milliGRAM(s) IV Push every 8 hours  dextrose 10% Bolus 125 milliLiter(s) IV Bolus once  dextrose 5%. 1000 milliLiter(s) (50 mL/Hr) IV Continuous <Continuous>  dextrose 5%. 1000 milliLiter(s) (100 mL/Hr) IV Continuous <Continuous>  dextrose 50% Injectable 25 Gram(s) IV Push once  dextrose 50% Injectable 12.5 Gram(s) IV Push once  enalapril 20 milliGRAM(s) Oral daily  glucagon  Injectable 1 milliGRAM(s) IntraMuscular once  insulin lispro (ADMELOG) corrective regimen sliding scale   SubCutaneous at bedtime  insulin lispro (ADMELOG) corrective regimen sliding scale   SubCutaneous three times a day before meals  latanoprost 0.005% Ophthalmic Solution 1 Drop(s) Both EYES at bedtime  metoprolol succinate ER 50 milliGRAM(s) Oral daily  rivaroxaban 20 milliGRAM(s) Oral with dinner  triamterene 37.5 mG/hydrochlorothiazide 25 mG Tablet 1 Tablet(s) Oral daily  vancomycin  IVPB 1500 milliGRAM(s) IV Intermittent every 12 hours    MEDICATIONS  (PRN):  acetaminophen     Tablet .. 650 milliGRAM(s) Oral every 6 hours PRN Temp greater or equal to 38C (100.4F), Mild Pain (1 - 3)  albuterol    90 MICROgram(s) HFA Inhaler 2 Puff(s) Inhalation every 6 hours PRN Wheezing  aluminum hydroxide/magnesium hydroxide/simethicone Suspension 30 milliLiter(s) Oral every 4 hours PRN Dyspepsia  dextrose Oral Gel 15 Gram(s) Oral once PRN Blood Glucose LESS THAN 70 milliGRAM(s)/deciliter  diphenhydrAMINE 25 milliGRAM(s) Oral every 8 hours PRN Rash and/or Itching  melatonin 3 milliGRAM(s) Oral at bedtime PRN Insomnia  ondansetron Injectable 4 milliGRAM(s) IV Push every 8 hours PRN Nausea and/or Vomiting          LABS:                          13.9   7.65  )-----------( 203      ( 08 Apr 2024 06:37 )             41.9   04-08    139  |  107  |  20  ----------------------------<  172<H>  4.1   |  25  |  0.92    Ca    9.0      08 Apr 2024 06:37  Phos  2.4     04-07  Mg     2.0     04-07          Urinalysis Basic - ( 07 Apr 2024 06:13 )    Color: x / Appearance: x / SG: x / pH: x  Gluc: 227 mg/dL / Ketone: x  / Bili: x / Urobili: x   Blood: x / Protein: x / Nitrite: x   Leuk Esterase: x / RBC: x / WBC x   Sq Epi: x / Non Sq Epi: x / Bacteria: x        RADIOLOGY:      < from: CT Angio Chest PE Protocol w/ IV Cont (04.06.24 @ 12:59) >    IMPRESSION:  No pulmonary thromboembolism.  Bilateral multi lobar pneumonia.  Small parapneumonic right pleural effusion.  Multiple enlarged mediastinal and hilar lymph nodes, favor   reactive/inflammatory.

## 2024-04-08 NOTE — PROGRESS NOTE ADULT - ASSESSMENT
1) Multilobar Pneumonia  2) Dyspnea  3) Abnormal CT Chest  4) Asthma   5) Cough  6) PE History     69-year-old male history of hypertension high cholesterol diabetes CAD status post stents PE on Xarelto presents the emergency department for shortness of breath and cough.  Symptoms started so he went to urgent care was placed on doxycycline is taken 3 doses fever and cough getting worse.  Tmax to 103 cough productive of sputum now feeling short of breath exam with diffuse wheezing bilaterally otherwise nonfocal concern for pneumonia not responding to outpatient treatments. He was also found to be in AF, rate ctr. Admit to tele (06 Apr 2024 13:31)  Called our answering service, was recommended to go to the ER  CTA performed- Patent central airways.  Bilateral multi lobar  peribronchial consolidative opacities, worse in right lower lobe with   diffuse right lower lobar interlobular septal thickening, peribronchial nodular opacities present in all lobes with diffuse peribronchial wall   thickening. Reactive adenopathy noted as well as a small right sided pleural effusion  he has a history of LEA, cleans the CPAP very well  Mild asthma- only on albuterol PRN at home   On Vancomycin and Cefepime  Reviewed CT Chest independently and discussed with patient   Symbicort + Albuterol PRN  Continue nocturnal CPAP  On Xarelto for PE history   Will continue to monitor

## 2024-04-08 NOTE — PROGRESS NOTE ADULT - ASSESSMENT
69-year-old male history of hypertension high cholesterol diabetes CAD status post stents PE on Xarelto presents the emergency department for shortness of breath and cough.  Symptoms started day prior to admit- went to urgent care was placed on doxycycline is taken 3 doses fever and cough getting worse.  Tmax to 103 cough productive of sputum now feeling short of breath exam with diffuse wheezing bilaterally otherwise nonfocal concern for pneumonia not responding to outpatient treatments. He was also found to be in AF, rate ctr. Admit to tele. Imaging shows  No pulmonary thromboembolism. Bilateral multi lobar pneumonia. Small parapneumonic right pleural effusion. Multiple enlarged mediastinal and hilar lymph nodes, favor reactive/inflammatory. Started on IV vancomycin/cefepime/azithromycin.     1. Fever. Dyspnea. Multilobar pneumonia. R pleural effusion. Asthma.   - imaging reviewed   - on azithromycin 500mg daily #3, complete 3 days  - on vancomycin [id number] check trough prior to 4th dose #2-3  - on cefepime 2gmq8h #2-3   - continue with antibiotic coverage  - f/u cultures- blood cx no growth  - f/u sputum cx, legionella ag -ordered  - fu cbc  - tolerating abx well so far; no side effects noted  - reason for abx use and side effects reviewed with patient  - supportive care    Clinical team may change from intravenous to oral antibiotics when the following criteria are met:   1. Patient is clinically improving/stable       a)	Improved signs and symptoms of infection from initial presentation       b)	Afebrile for 24 hours       c)	Leukocytosis trending towards normal range   2. Patient is tolerating oral intake   3. Initial/repeat blood cultures are negative     When above criteria met  may change iv antibiotics to: po doxycycline/po ceftin x 7 day course

## 2024-04-08 NOTE — PRE-OP CHECKLIST - HEIGHT IN FEET
[facility] HEALTH ORDERS  FACE TO FACE ENCOUNTER    Patient Name: Madhu Grant  YOB: 1956    PCP: EL Sheridan MD   PCP Address: [address] [facility] [place] / [place] [address]  PCP Phone Number: [phone]  PCP Fax: [phone]    Encounter Date: 10/18/2018    Admit to Home Health    Diagnoses:  Active Hospital Problems    Diagnosis  POA    *Malignant ascites [R18.0]  Yes    Mass of hepatic flexure of colon [K63.9]  Yes     CT 10/17/18: Probable nonobstructing colon mass near the hepatic flexure. Primary colon malignancy is a strong consideration.        T12 compression fracture [S22.080A]  Unknown    Hypotension [I95.9]  Yes    Hepatomegaly [R16.0]  Yes    Pulmonary nodules/lesions, multiple [R91.8]  Yes    Leukocytosis [D72.829]  Yes    ELEANOR (acute kidney injury) [N17.9]  Yes    Transaminitis [R74.0]  Yes    Pleural plaque [J92.9]  Yes    Hyperbilirubinemia [E80.6]  Yes    Chronic back pain [M54.9, G89.29]  Yes    Tension type headache [G44.209]  Yes      Resolved Hospital Problems    Diagnosis Date Resolved POA    Elevated lactic acid level [R79.89] 10/17/2018 Yes    Hyperkalemia [E87.5] 10/17/2018 Yes    Sepsis [A41.9] 10/18/2018 Yes       Future Appointments   Date Time Provider Department Center   10/18/2018  4:50 PM [facility] [id number] [facility] RAD IR [facility]     Follow-up Information     [facility].    Specialty:  Home Health Services  Contact information:  [address]  [address]  [place] [address]  [phone]             EL Sheridan MD In 1 week.    Specialty:  Internal Medicine  Contact information:  [address] [place] [address]  [phone]                     I have seen and examined this patient face to face today. My clinical findings that support the need for the home health skilled services and home bound status are the following:  Weakness/numbness causing balance and  gait disturbance due to Malignancy/Cancer making it taxing to leave home.    Allergies:  Review of patient's allergies indicates:   Allergen Reactions    Iodine Hives    Latex, natural rubber Hives, Itching and Edema    Venom-honey bee Hives, Itching and Edema       Diet: regular diet    Activities: activity as tolerated    Nursing:   SN to complete comprehensive assessment including routine vital signs. Instruct on disease process and s/s of complications to report to MD. Review/verify medication list sent home with the patient at time of discharge  and instruct patient/caregiver as needed. Frequency may be adjusted depending on start of care date.    Notify MD if SBP > 160 or < 90; DBP > 90 or < 50; HR > 120 or < 50; Temp > 101; Other:         CONSULTS:    Physical Therapy to evaluate and treat. Evaluate for home safety and equipment needs; Establish/upgrade home exercise program. Perform / instruct on therapeutic exercises, gait training, transfer training, and Range of Motion.  Occupational Therapy to evaluate and treat. Evaluate home environment for safety and equipment needs. Perform/Instruct on transfers, ADL training, ROM, and therapeutic exercises.        Medications: Review discharge medications with patient and family and provide education.      Current Discharge Medication List      CONTINUE these medications which have NOT CHANGED    Details   diphenhydrAMINE (BENADRYL) 25 mg capsule Take 25 mg by mouth daily as needed for Itching.      gabapentin (NEURONTIN) 300 MG capsule Take 300 mg by mouth once daily.      HYDROcodone-acetaminophen (NORCO)  mg per tablet TAKE ONE TABLET BY MOUTH EVERY 4 TO 6 HOURS AS NEEDED FOR PAIN  Qty: 90 tablet, Refills: 0      lisinopril (PRINIVIL,ZESTRIL) 20 MG tablet TAKE ONE TABLET (20 MG TOTAL) BY MOUTH ONCE DAILY  Qty: 30 tablet, Refills: 6    Comments: Please consider 90 day supplies to promote better adherence         STOP taking these medications       aspirin  (ECOTRIN) 81 MG EC tablet Comments:   Reason for Stopping:         pravastatin (PRAVACHOL) 40 MG tablet Comments:   Reason for Stopping:               I certify that this patient is confined to his home and needs intermittent skilled nursing care, physical therapy and occupational therapy.       5

## 2024-04-08 NOTE — PROGRESS NOTE ADULT - ASSESSMENT
69-year-old male history of hypertension high cholesterol diabetes CAD status post stents PE on Xarelto presents the emergency department for shortness of breath and cough. Symptoms started yesterday went to urgent care was placed on doxycycline is taken 3 doses fever and cough getting worse.  Tmax to 103 cough productive of sputum now feeling short of breath exam with diffuse wheezing bilaterally otherwise nonfocal concern for pneumonia not responding to outpatient treatments. He was also found to be in AF, rate ctr. Admit to tele (06 Apr 2024 13:31)    Multifocal pneumonia, suspect GNR PNA  -CT chest as above  -Continue Cefepime and Vanco  -ID consult appreciated, added azithromycin   -Pulm consult appreciated   -Breathing well on RA, afebrile   -Supportive care     New onset AF s/p cardioversion   -[id number]: 6 (age, DM, HTN, thromboembolism, MI)  -continue Xarelto   -EP consult noted, increased metoprolol ER to 50mg daily, s/p cardioversion 4/8  -TTE results pending   -Continue remote tele     Diabetes Mellitus type 2   -Hold home medications: metformin   -Hypoglycemia Protocol, ISS, Accuchecks AC&HS.  -Diet: Consistent Carbs w/ Evening Snack  -HbA1c 7.7%    HTN/HLD/CAD  -Continue home meds     H/O PE, CAD, IVF filter   -Continue Xarelto     VTE ppx: on Xarelto     Dispo: continue medical mangement

## 2024-04-08 NOTE — PROGRESS NOTE ADULT - NS ATTEND AMEND GEN_ALL_CORE FT
agree with a/p as above  I spent a total of 35 minutes on the encounter, including chart review, evaluating and discussing treatment options with the patient, as well as counseling and coordination as stated above.

## 2024-04-08 NOTE — PRE-OP CHECKLIST - BSA (M2)
Patient Education     Sty (or Stye)  A sty is when the oil gland of the eyelid becomes inflamed. It may develop into an infection with a small pocket of pus (an abscess). This can cause pain, redness, and swelling. In early stages, a sty is treated with antibiotic cream, eye drops, or a small towel soaked in warm water (a warm compress). More severe cases may need to be opened and drained by a healthcare provider.   Home care  · Eye drops or ointment are often prescribed to treat the infection. Use these as directed.   · Artificial tears may also be used to lubricate the eye and make it more comfortable. You can buy these over the counter without a prescription. Talk with your healthcare provider before using any over-the-counter treatment for a sty.  · Apply a warm, damp towel to the affected area for at least 5 minutes, 3 to 4 times a day for a week. Warm compresses open the pores and speed the healing. Make sure the compresses are not too hot, as they may burn your eyelid.  · Sometimes the sty will drain with this treatment alone. If this happens, keep using the antibiotic until all the redness and swelling are gone.  · Wash your hands before and after touching the infected eyelid.  · Don’t squeeze or try to break open the sty.    Follow-up care  Follow up with your healthcare provider, or as advised.   When to seek medical advice  Call your healthcare provider or seek medical care right away if any of these occur:   · Increase in swelling or redness around the eyelid after 48 to 72 hours  · Increase in eye pain or the eyelid blisters  · Increase in warmth--the eyelid feels hot  · Drainage of blood or thick pus from the sty  · Blister on the eyelid  · Inability to open the eyelid due to swelling  · Fever of 100.4°F (38°C) or above, or as directed by your provider  · Vision changes  · Headache or stiff neck  · The sty comes back  [employer] last reviewed this educational content on 6/1/2020  © 5052-9660 [employer]. All rights reserved. This information is not intended as a substitute for professional medical care. Always follow your healthcare professional's instructions.            2.38

## 2024-04-08 NOTE — PROGRESS NOTE ADULT - SUBJECTIVE AND OBJECTIVE BOX
HPI: 69-year-old male history of hypertension high cholesterol diabetes CAD status post stents PE on Xarelto presents the emergency department for shortness of breath and cough.  Symptoms started yesterday went to urgent care was placed on doxycycline is taken 3 doses fever and cough getting worse.  Tmax to 103 cough productive of sputum now feeling short of breath exam with diffuse wheezing bilaterally otherwise nonfocal concern for pneumonia not responding to outpatient treatments. He was also found to be in AF, rate ctr. Admit to tele (06 Apr 2024 13:31)  Tele Afib rate controlled 80-90s bpm, some brief RVR to 120s bpm    4/8/24: pt is ambulating, still cough but much less, denies acute SOB/palpitations  tele: AFib 's bpm  echo -pending  ST (9/13/23) LVEF 52%, no ischemia    ROS: All other ROS is negative unless indicated above.      Physical Exam:  Vital Signs Last 24 Hrs  T(C): 36 (08 Apr 2024 07:46), Max: 37.2 (07 Apr 2024 15:51)  T(F): 96.8 (08 Apr 2024 07:46), Max: 99 (07 Apr 2024 15:51)  HR: 77 (08 Apr 2024 07:46) (65 - 77)  BP: 125/72 (08 Apr 2024 07:46) (125/72 - 135/89)  RR: 18 (08 Apr 2024 07:46) (18 - 18)  SpO2: 92% (08 Apr 2024 07:46) (92% - 93%)    Parameters below as of 08 Apr 2024 07:46  Patient On (Oxygen Delivery Method): room air    Constitutional: well developed, no deformities and no acute distress    Neurological: Alert & Oriented x 3, VALADEZ, no focal deficits    HEENT: NC/AT, PERRLA, EOMI,  Neck supple.    Respiratory: few coarse rhonchi at bases    Cardiovascular: (+) irregular S1 & S2,    Gastrointestinal: soft, NT, nondistended, (+) BS    Genitourinary: non distended bladder, voiding freely    Extremities: No pedal edema, No clubbing, No cyanosis    Skin:  normal skin color and pigmentation, no skin lesions            Allergies    No Known Allergies    Intolerances      MEDICATIONS  (STANDING):  allopurinol 100 milliGRAM(s) Oral daily  atorvastatin 20 milliGRAM(s) Oral at bedtime  azithromycin   Tablet 500 milliGRAM(s) Oral daily  budesonide 160 MICROgram(s)/formoterol 4.5 MICROgram(s) Inhaler 2 Puff(s) Inhalation two times a day  cefepime  Injectable. 2000 milliGRAM(s) IV Push every 8 hours  dextrose 10% Bolus 125 milliLiter(s) IV Bolus once  dextrose 5%. 1000 milliLiter(s) (50 mL/Hr) IV Continuous <Continuous>  dextrose 5%. 1000 milliLiter(s) (100 mL/Hr) IV Continuous <Continuous>  dextrose 50% Injectable 25 Gram(s) IV Push once  dextrose 50% Injectable 12.5 Gram(s) IV Push once  enalapril 20 milliGRAM(s) Oral daily  glucagon  Injectable 1 milliGRAM(s) IntraMuscular once  insulin lispro (ADMELOG) corrective regimen sliding scale   SubCutaneous at bedtime  insulin lispro (ADMELOG) corrective regimen sliding scale   SubCutaneous three times a day before meals  latanoprost 0.005% Ophthalmic Solution 1 Drop(s) Both EYES at bedtime  metoprolol succinate ER 50 milliGRAM(s) Oral daily  rivaroxaban 20 milliGRAM(s) Oral with dinner  triamterene 37.5 mG/hydrochlorothiazide 25 mG Tablet 1 Tablet(s) Oral daily  vancomycin  IVPB 1500 milliGRAM(s) IV Intermittent every 12 hours    MEDICATIONS  (PRN):  acetaminophen     Tablet .. 650 milliGRAM(s) Oral every 6 hours PRN Temp greater or equal to 38C (100.4F), Mild Pain (1 - 3)  albuterol    90 MICROgram(s) HFA Inhaler 2 Puff(s) Inhalation every 6 hours PRN Wheezing  aluminum hydroxide/magnesium hydroxide/simethicone Suspension 30 milliLiter(s) Oral every 4 hours PRN Dyspepsia  dextrose Oral Gel 15 Gram(s) Oral once PRN Blood Glucose LESS THAN 70 milliGRAM(s)/deciliter  diphenhydrAMINE 25 milliGRAM(s) Oral every 8 hours PRN Rash and/or Itching  melatonin 3 milliGRAM(s) Oral at bedtime PRN Insomnia  ondansetron Injectable 4 milliGRAM(s) IV Push every 8 hours PRN Nausea and/or Vomiting    LABS:                        13.9   7.65  )-----------( 203      ( 08 Apr 2024 06:37 )             41.9     04-08    139  |  107  |  20  ----------------------------<  172<H>  4.1   |  25  |  0.92    Ca    9.0      08 Apr 2024 06:37  Phos  2.4     04-07  Mg     2.0     04-07    TPro  7.3  /  Alb  3.2<L>  /  TBili  2.1<H>  /  DBili  x   /  AST  24  /  ALT  21  /  AlkPhos  36<L>  04-06    PT/INR - ( 06 Apr 2024 11:51 )   PT: 13.9 sec;   INR: 1.24 ratio         PTT - ( 06 Apr 2024 11:51 )  PTT:27.3 sec

## 2024-04-09 ENCOUNTER — TRANSCRIPTION ENCOUNTER (OUTPATIENT)
Age: 69
End: 2024-04-09

## 2024-04-09 LAB
ANION GAP SERPL CALC-SCNC: 6 MMOL/L — SIGNIFICANT CHANGE UP (ref 5–17)
BUN SERPL-MCNC: 22 MG/DL — SIGNIFICANT CHANGE UP (ref 7–23)
CALCIUM SERPL-MCNC: 9.4 MG/DL — SIGNIFICANT CHANGE UP (ref 8.5–10.1)
CHLORIDE SERPL-SCNC: 109 MMOL/L — HIGH (ref 96–108)
CO2 SERPL-SCNC: 25 MMOL/L — SIGNIFICANT CHANGE UP (ref 22–31)
CREAT SERPL-MCNC: 1.04 MG/DL — SIGNIFICANT CHANGE UP (ref 0.5–1.3)
CULTURE RESULTS: SIGNIFICANT CHANGE UP
EGFR: 78 ML/MIN/1.73M2 — SIGNIFICANT CHANGE UP
GLUCOSE SERPL-MCNC: 176 MG/DL — HIGH (ref 70–99)
MAGNESIUM SERPL-MCNC: 2.4 MG/DL — SIGNIFICANT CHANGE UP (ref 1.6–2.6)
PHOSPHATE SERPL-MCNC: 4 MG/DL — SIGNIFICANT CHANGE UP (ref 2.5–4.5)
POTASSIUM SERPL-MCNC: 3.9 MMOL/L — SIGNIFICANT CHANGE UP (ref 3.5–5.3)
POTASSIUM SERPL-SCNC: 3.9 MMOL/L — SIGNIFICANT CHANGE UP (ref 3.5–5.3)
SODIUM SERPL-SCNC: 140 MMOL/L — SIGNIFICANT CHANGE UP (ref 135–145)
SPECIMEN SOURCE: SIGNIFICANT CHANGE UP

## 2024-04-09 PROCEDURE — 99232 SBSQ HOSP IP/OBS MODERATE 35: CPT

## 2024-04-09 RX ADMIN — CEFEPIME 2000 MILLIGRAM(S): 1 INJECTION, POWDER, FOR SOLUTION INTRAMUSCULAR; INTRAVENOUS at 13:57

## 2024-04-09 RX ADMIN — Medication 2: at 09:12

## 2024-04-09 RX ADMIN — Medication 50 MILLIGRAM(S): at 10:03

## 2024-04-09 RX ADMIN — Medication 1 TABLET(S): at 09:59

## 2024-04-09 RX ADMIN — Medication 100 MILLIGRAM(S): at 10:00

## 2024-04-09 RX ADMIN — BUDESONIDE AND FORMOTEROL FUMARATE DIHYDRATE 2 PUFF(S): 160; 4.5 AEROSOL RESPIRATORY (INHALATION) at 21:39

## 2024-04-09 RX ADMIN — Medication 20 MILLIGRAM(S): at 10:03

## 2024-04-09 RX ADMIN — BUDESONIDE AND FORMOTEROL FUMARATE DIHYDRATE 2 PUFF(S): 160; 4.5 AEROSOL RESPIRATORY (INHALATION) at 09:15

## 2024-04-09 RX ADMIN — Medication 2: at 12:12

## 2024-04-09 RX ADMIN — RIVAROXABAN 20 MILLIGRAM(S): KIT at 17:31

## 2024-04-09 RX ADMIN — Medication 250 MILLIGRAM(S): at 10:00

## 2024-04-09 RX ADMIN — CEFEPIME 2000 MILLIGRAM(S): 1 INJECTION, POWDER, FOR SOLUTION INTRAMUSCULAR; INTRAVENOUS at 22:42

## 2024-04-09 RX ADMIN — CEFEPIME 2000 MILLIGRAM(S): 1 INJECTION, POWDER, FOR SOLUTION INTRAMUSCULAR; INTRAVENOUS at 05:56

## 2024-04-09 RX ADMIN — ATORVASTATIN CALCIUM 20 MILLIGRAM(S): 80 TABLET, FILM COATED ORAL at 22:42

## 2024-04-09 RX ADMIN — Medication 250 MILLIGRAM(S): at 22:42

## 2024-04-09 RX ADMIN — LATANOPROST 1 DROP(S): 0.05 SOLUTION/ DROPS OPHTHALMIC; TOPICAL at 22:33

## 2024-04-09 NOTE — DISCHARGE NOTE NURSING/CASE MANAGEMENT/SOCIAL WORK - NSDCFUADDAPPT_GEN_ALL_CORE_FT
Pulmonary Follow Up Appointment  Dr. Morocho's office  04/10/2024  @ 1:15PM  [address] [phone]

## 2024-04-09 NOTE — PROGRESS NOTE ADULT - SUBJECTIVE AND OBJECTIVE BOX
HPI: 69-year-old male history of hypertension high cholesterol diabetes CAD status post stents PE on Xarelto presents the emergency department for shortness of breath and cough.  Symptoms started yesterday went to urgent care was placed on doxycycline is taken 3 doses fever and cough getting worse.  Tmax to 103 cough productive of sputum now feeling short of breath exam with diffuse wheezing bilaterally otherwise nonfocal concern for pneumonia not responding to outpatient treatments. He was also found to be in AF, rate ctr. Admit to tele (06 Apr 2024 13:31)    4/8: Patient seen and examined today, feels better, no overnight issues reported, continued Afib, s/p cardioversion today, VSS.   4/9:   REVIEW OF SYSTEMS:    CONSTITUTIONAL: No weakness, fevers or chills  EYES/ENT: No visual changes;  No vertigo or throat pain   NECK: No pain or stiffness  RESPIRATORY: No cough, wheezing, hemoptysis; No shortness of breath  CARDIOVASCULAR: No chest pain or palpitations  GASTROINTESTINAL: No abdominal or epigastric pain. No nausea, vomiting, or hematemesis; No diarrhea or constipation. No melena or hematochezia.  GENITOURINARY: No dysuria, frequency or hematuria  NEUROLOGICAL: No numbness or weakness  SKIN: No itching, rashes      Vital Signs Last 24 Hrs  T(C): 36.7 (09 Apr 2024 07:45), Max: 36.7 (08 Apr 2024 15:30)  T(F): 98.1 (09 Apr 2024 07:45), Max: 98.1 (08 Apr 2024 15:30)  HR: 77 (09 Apr 2024 07:45) (74 - 88)  BP: 139/90 (09 Apr 2024 07:45) (105/82 - 149/85)  RR: 18 (09 Apr 2024 07:45) (18 - 20)  SpO2: 92% (09 Apr 2024 07:45) (92% - 95%)    Parameters below as of 09 Apr 2024 07:45  Patient On (Oxygen Delivery Method): room air      PHYSICAL EXAM:  GENERAL: NAD, lying in bed comfortably  HEAD:  Atraumatic, Normocephalic  EYES: conjunctiva and sclera clear  ENT: Moist mucous membranes  NECK: Supple, No JVD  CHEST/LUNG: Bilateral air entry, + rhonchi bilaterally, Unlabored respirations  HEART: Regular rate and rhythm; No murmurs  ABDOMEN: Bowel sounds present; Soft, Nontender, Nondistended.   EXTREMITIES:  2+ Peripheral Pulses, brisk capillary refill. No clubbing, cyanosis, or edema  NERVOUS SYSTEM:  Alert & Oriented X3, speech clear. No deficits   MSK: FROM all 4 extremities, full and equal strength    MEDICATIONS  (STANDING):  allopurinol 100 milliGRAM(s) Oral daily  atorvastatin 20 milliGRAM(s) Oral at bedtime  azithromycin   Tablet 500 milliGRAM(s) Oral daily  budesonide 160 MICROgram(s)/formoterol 4.5 MICROgram(s) Inhaler 2 Puff(s) Inhalation two times a day  cefepime  Injectable. 2000 milliGRAM(s) IV Push every 8 hours  dextrose 10% Bolus 125 milliLiter(s) IV Bolus once  dextrose 5%. 1000 milliLiter(s) (50 mL/Hr) IV Continuous <Continuous>  dextrose 5%. 1000 milliLiter(s) (100 mL/Hr) IV Continuous <Continuous>  dextrose 50% Injectable 25 Gram(s) IV Push once  dextrose 50% Injectable 12.5 Gram(s) IV Push once  enalapril 20 milliGRAM(s) Oral daily  glucagon  Injectable 1 milliGRAM(s) IntraMuscular once  insulin lispro (ADMELOG) corrective regimen sliding scale   SubCutaneous at bedtime  insulin lispro (ADMELOG) corrective regimen sliding scale   SubCutaneous three times a day before meals  latanoprost 0.005% Ophthalmic Solution 1 Drop(s) Both EYES at bedtime  metoprolol succinate ER 50 milliGRAM(s) Oral daily  rivaroxaban 20 milliGRAM(s) Oral with dinner  triamterene 37.5 mG/hydrochlorothiazide 25 mG Tablet 1 Tablet(s) Oral daily  vancomycin  IVPB 1500 milliGRAM(s) IV Intermittent every 12 hours    MEDICATIONS  (PRN):  acetaminophen     Tablet .. 650 milliGRAM(s) Oral every 6 hours PRN Temp greater or equal to 38C (100.4F), Mild Pain (1 - 3)  albuterol    90 MICROgram(s) HFA Inhaler 2 Puff(s) Inhalation every 6 hours PRN Wheezing  aluminum hydroxide/magnesium hydroxide/simethicone Suspension 30 milliLiter(s) Oral every 4 hours PRN Dyspepsia  dextrose Oral Gel 15 Gram(s) Oral once PRN Blood Glucose LESS THAN 70 milliGRAM(s)/deciliter  diphenhydrAMINE 25 milliGRAM(s) Oral every 8 hours PRN Rash and/or Itching  melatonin 3 milliGRAM(s) Oral at bedtime PRN Insomnia  ondansetron Injectable 4 milliGRAM(s) IV Push every 8 hours PRN Nausea and/or Vomiting          LABS:                                     13.9   7.65  )-----------( 203      ( 08 Apr 2024 06:37 )             41.9   04-09    140  |  109<H>  |  22  ----------------------------<  176<H>  3.9   |  25  |  1.04    Ca    9.4      09 Apr 2024 06:58  Phos  4.0     04-09  Mg     2.4     04-09            Urinalysis Basic - ( 07 Apr 2024 06:13 )    Color: x / Appearance: x / SG: x / pH: x  Gluc: 227 mg/dL / Ketone: x  / Bili: x / Urobili: x   Blood: x / Protein: x / Nitrite: x   Leuk Esterase: x / RBC: x / WBC x   Sq Epi: x / Non Sq Epi: x / Bacteria: x        RADIOLOGY:      < from: CT Angio Chest PE Protocol w/ IV Cont (04.06.24 @ 12:59) >    IMPRESSION:  No pulmonary thromboembolism.  Bilateral multi lobar pneumonia.  Small parapneumonic right pleural effusion.  Multiple enlarged mediastinal and hilar lymph nodes, favor   reactive/inflammatory.           HPI: 69-year-old male history of hypertension high cholesterol diabetes CAD status post stents PE on Xarelto presents the emergency department for shortness of breath and cough.  Symptoms started yesterday went to urgent care was placed on doxycycline is taken 3 doses fever and cough getting worse.  Tmax to 103 cough productive of sputum now feeling short of breath exam with diffuse wheezing bilaterally otherwise nonfocal concern for pneumonia not responding to outpatient treatments. He was also found to be in AF, rate ctr. Admit to tele (06 Apr 2024 13:31)    4/8: Patient seen and examined today, feels better, no overnight issues reported, continued Afib, s/p cardioversion today, VSS.   4/9: pt seen today, feels well, continued cough, no SOB, NSR on tele s/p CV yesterday, SpO2 94% on ambulation on RA (done by RN today)      REVIEW OF SYSTEMS:    CONSTITUTIONAL: No weakness, fevers or chills  EYES/ENT: No visual changes;  No vertigo or throat pain   NECK: No pain or stiffness  RESPIRATORY: No cough, wheezing, hemoptysis; No shortness of breath  CARDIOVASCULAR: No chest pain or palpitations  GASTROINTESTINAL: No abdominal or epigastric pain. No nausea, vomiting, or hematemesis; No diarrhea or constipation. No melena or hematochezia.  GENITOURINARY: No dysuria, frequency or hematuria  NEUROLOGICAL: No numbness or weakness  SKIN: No itching, rashes      Vital Signs Last 24 Hrs  T(C): 36.7 (09 Apr 2024 07:45), Max: 36.7 (08 Apr 2024 15:30)  T(F): 98.1 (09 Apr 2024 07:45), Max: 98.1 (08 Apr 2024 15:30)  HR: 77 (09 Apr 2024 07:45) (74 - 88)  BP: 139/90 (09 Apr 2024 07:45) (105/82 - 149/85)  RR: 18 (09 Apr 2024 07:45) (18 - 20)  SpO2: 92% (09 Apr 2024 07:45) (92% - 95%)    Parameters below as of 09 Apr 2024 07:45  Patient On (Oxygen Delivery Method): room air      PHYSICAL EXAM:  GENERAL: NAD, lying in bed comfortably  HEAD:  Atraumatic, Normocephalic  EYES: conjunctiva and sclera clear  ENT: Moist mucous membranes  NECK: Supple, No JVD  CHEST/LUNG: Bilateral air entry, + rhonchi bilaterally, Unlabored respirations  HEART: Regular rate and rhythm; No murmurs  ABDOMEN: Bowel sounds present; Soft, Nontender, Nondistended.   EXTREMITIES:  2+ Peripheral Pulses, brisk capillary refill. No clubbing, cyanosis, or edema  NERVOUS SYSTEM:  Alert & Oriented X3, speech clear. No deficits   MSK: FROM all 4 extremities, full and equal strength    MEDICATIONS  (STANDING):  allopurinol 100 milliGRAM(s) Oral daily  atorvastatin 20 milliGRAM(s) Oral at bedtime  azithromycin   Tablet 500 milliGRAM(s) Oral daily  budesonide 160 MICROgram(s)/formoterol 4.5 MICROgram(s) Inhaler 2 Puff(s) Inhalation two times a day  cefepime  Injectable. 2000 milliGRAM(s) IV Push every 8 hours  dextrose 10% Bolus 125 milliLiter(s) IV Bolus once  dextrose 5%. 1000 milliLiter(s) (50 mL/Hr) IV Continuous <Continuous>  dextrose 5%. 1000 milliLiter(s) (100 mL/Hr) IV Continuous <Continuous>  dextrose 50% Injectable 25 Gram(s) IV Push once  dextrose 50% Injectable 12.5 Gram(s) IV Push once  enalapril 20 milliGRAM(s) Oral daily  glucagon  Injectable 1 milliGRAM(s) IntraMuscular once  insulin lispro (ADMELOG) corrective regimen sliding scale   SubCutaneous at bedtime  insulin lispro (ADMELOG) corrective regimen sliding scale   SubCutaneous three times a day before meals  latanoprost 0.005% Ophthalmic Solution 1 Drop(s) Both EYES at bedtime  metoprolol succinate ER 50 milliGRAM(s) Oral daily  rivaroxaban 20 milliGRAM(s) Oral with dinner  triamterene 37.5 mG/hydrochlorothiazide 25 mG Tablet 1 Tablet(s) Oral daily  vancomycin  IVPB 1500 milliGRAM(s) IV Intermittent every 12 hours    MEDICATIONS  (PRN):  acetaminophen     Tablet .. 650 milliGRAM(s) Oral every 6 hours PRN Temp greater or equal to 38C (100.4F), Mild Pain (1 - 3)  albuterol    90 MICROgram(s) HFA Inhaler 2 Puff(s) Inhalation every 6 hours PRN Wheezing  aluminum hydroxide/magnesium hydroxide/simethicone Suspension 30 milliLiter(s) Oral every 4 hours PRN Dyspepsia  dextrose Oral Gel 15 Gram(s) Oral once PRN Blood Glucose LESS THAN 70 milliGRAM(s)/deciliter  diphenhydrAMINE 25 milliGRAM(s) Oral every 8 hours PRN Rash and/or Itching  melatonin 3 milliGRAM(s) Oral at bedtime PRN Insomnia  ondansetron Injectable 4 milliGRAM(s) IV Push every 8 hours PRN Nausea and/or Vomiting          LABS:                        13.9   7.65  )-----------( 203      ( 08 Apr 2024 06:37 )             41.9   04-09    140  |  109<H>  |  22  ----------------------------<  176<H>  3.9   |  25  |  1.04    Ca    9.4      09 Apr 2024 06:58  Phos  4.0     04-09  Mg     2.4     04-09            Urinalysis Basic - ( 07 Apr 2024 06:13 )    Color: x / Appearance: x / SG: x / pH: x  Gluc: 227 mg/dL / Ketone: x  / Bili: x / Urobili: x   Blood: x / Protein: x / Nitrite: x   Leuk Esterase: x / RBC: x / WBC x   Sq Epi: x / Non Sq Epi: x / Bacteria: x        RADIOLOGY:      < from: CT Angio Chest PE Protocol w/ IV Cont (04.06.24 @ 12:59) >    IMPRESSION:  No pulmonary thromboembolism.  Bilateral multi lobar pneumonia.  Small parapneumonic right pleural effusion.  Multiple enlarged mediastinal and hilar lymph nodes, favor   reactive/inflammatory.           HPI: 69-year-old male history of hypertension high cholesterol diabetes CAD status post stents PE on Xarelto presents the emergency department for shortness of breath and cough.  Symptoms started yesterday went to urgent care was placed on doxycycline is taken 3 doses fever and cough getting worse.  Tmax to 103 cough productive of sputum now feeling short of breath exam with diffuse wheezing bilaterally otherwise nonfocal concern for pneumonia not responding to outpatient treatments. He was also found to be in AF, rate ctr. Admit to tele (06 Apr 2024 13:31)    4/8: Patient seen and examined today, feels better, no overnight issues reported, continued Afib, s/p cardioversion today, VSS.   4/9: pt seen today, feels well, continued cough, no SOB, NSR on tele s/p CV yesterday, SpO2 94% on ambulation on RA (done by RN today)      REVIEW OF SYSTEMS:    CONSTITUTIONAL: No weakness, fevers or chills  EYES/ENT: No visual changes;  No vertigo or throat pain   NECK: No pain or stiffness  RESPIRATORY: No cough, wheezing, hemoptysis; No shortness of breath  CARDIOVASCULAR: No chest pain or palpitations  GASTROINTESTINAL: No abdominal or epigastric pain. No nausea, vomiting, or hematemesis; No diarrhea or constipation. No melena or hematochezia.  GENITOURINARY: No dysuria, frequency or hematuria  NEUROLOGICAL: No numbness or weakness  SKIN: No itching, rashes      Vital Signs Last 24 Hrs  T(C): 36.7 (09 Apr 2024 07:45), Max: 36.7 (08 Apr 2024 15:30)  T(F): 98.1 (09 Apr 2024 07:45), Max: 98.1 (08 Apr 2024 15:30)  HR: 77 (09 Apr 2024 07:45) (74 - 88)  BP: 139/90 (09 Apr 2024 07:45) (105/82 - 149/85)  RR: 18 (09 Apr 2024 07:45) (18 - 20)  SpO2: 92% (09 Apr 2024 07:45) (92% - 95%)    Parameters below as of 09 Apr 2024 07:45  Patient On (Oxygen Delivery Method): room air      PHYSICAL EXAM:  GENERAL: NAD, lying in bed comfortably  HEAD:  Atraumatic, Normocephalic  EYES: conjunctiva and sclera clear  ENT: Moist mucous membranes  NECK: Supple, No JVD  CHEST/LUNG: Bilateral air entry, + rhonchi bilaterally, Unlabored respirations  HEART: Regular rate and rhythm; No murmurs  ABDOMEN: Bowel sounds present; Soft, Nontender, Nondistended.   EXTREMITIES:  2+ Peripheral Pulses, brisk capillary refill. No clubbing, cyanosis, or edema  NERVOUS SYSTEM:  Alert & Oriented X3, speech clear. No deficits   MSK: FROM all 4 extremities, full and equal strength    MEDICATIONS  (STANDING):  allopurinol 100 milliGRAM(s) Oral daily  atorvastatin 20 milliGRAM(s) Oral at bedtime  budesonide 160 MICROgram(s)/formoterol 4.5 MICROgram(s) Inhaler 2 Puff(s) Inhalation two times a day  cefepime  Injectable. 2000 milliGRAM(s) IV Push every 8 hours  dextrose 10% Bolus 125 milliLiter(s) IV Bolus once  dextrose 5%. 1000 milliLiter(s) (50 mL/Hr) IV Continuous <Continuous>  dextrose 5%. 1000 milliLiter(s) (100 mL/Hr) IV Continuous <Continuous>  dextrose 50% Injectable 25 Gram(s) IV Push once  dextrose 50% Injectable 12.5 Gram(s) IV Push once  enalapril 20 milliGRAM(s) Oral daily  glucagon  Injectable 1 milliGRAM(s) IntraMuscular once  insulin lispro (ADMELOG) corrective regimen sliding scale   SubCutaneous at bedtime  insulin lispro (ADMELOG) corrective regimen sliding scale   SubCutaneous three times a day before meals  latanoprost 0.005% Ophthalmic Solution 1 Drop(s) Both EYES at bedtime  metoprolol succinate ER 50 milliGRAM(s) Oral daily  rivaroxaban 20 milliGRAM(s) Oral with dinner  triamterene 37.5 mG/hydrochlorothiazide 25 mG Tablet 1 Tablet(s) Oral daily  vancomycin  IVPB 1500 milliGRAM(s) IV Intermittent every 12 hours    MEDICATIONS  (PRN):  acetaminophen     Tablet .. 650 milliGRAM(s) Oral every 6 hours PRN Temp greater or equal to 38C (100.4F), Mild Pain (1 - 3)  albuterol    90 MICROgram(s) HFA Inhaler 2 Puff(s) Inhalation every 6 hours PRN Wheezing  aluminum hydroxide/magnesium hydroxide/simethicone Suspension 30 milliLiter(s) Oral every 4 hours PRN Dyspepsia  dextrose Oral Gel 15 Gram(s) Oral once PRN Blood Glucose LESS THAN 70 milliGRAM(s)/deciliter  diphenhydrAMINE 25 milliGRAM(s) Oral every 8 hours PRN Rash and/or Itching  melatonin 3 milliGRAM(s) Oral at bedtime PRN Insomnia  ondansetron Injectable 4 milliGRAM(s) IV Push every 8 hours PRN Nausea and/or Vomiting                LABS:                        13.9   7.65  )-----------( 203      ( 08 Apr 2024 06:37 )             41.9   04-09    140  |  109<H>  |  22  ----------------------------<  176<H>  3.9   |  25  |  1.04    Ca    9.4      09 Apr 2024 06:58  Phos  4.0     04-09  Mg     2.4     04-09            Urinalysis Basic - ( 07 Apr 2024 06:13 )    Color: x / Appearance: x / SG: x / pH: x  Gluc: 227 mg/dL / Ketone: x  / Bili: x / Urobili: x   Blood: x / Protein: x / Nitrite: x   Leuk Esterase: x / RBC: x / WBC x   Sq Epi: x / Non Sq Epi: x / Bacteria: x        RADIOLOGY:      < from: CT Angio Chest PE Protocol w/ IV Cont (04.06.24 @ 12:59) >    IMPRESSION:  No pulmonary thromboembolism.  Bilateral multi lobar pneumonia.  Small parapneumonic right pleural effusion.  Multiple enlarged mediastinal and hilar lymph nodes, favor   reactive/inflammatory.      < from: TTE Echo Complete w/o Contrast w/ Doppler (04.08.24 @ 10:55) >   Summary     The left ventricle is normal in size, wall thickness, wall motion and   contractility.   Estimated left ventricular ejection fraction is 60-65 %.   GLS measuring at -21.4%.   Normal diastolic function.   Normal diastolic function.   Mild left atrial enlargement.   Normal appearing right atrium.   Normal appearing right ventricle structure and function.   The aortic valve is trileaflet.   Mild aortic sclerosis is present with normal valvular opening.   No aortic regurgitation is present.   The mitral valve leaflets appear thin and normal.   Trace mitral regurgitation is present.   No evidence of pericardial effusion.   No evidence of pleural effusion.

## 2024-04-09 NOTE — PROGRESS NOTE ADULT - ASSESSMENT
69-year-old male history of hypertension high cholesterol diabetes CAD status post stents PE on Xarelto presents the emergency department for shortness of breath and cough. Symptoms started yesterday went to urgent care was placed on doxycycline is taken 3 doses fever and cough getting worse.  Tmax to 103 cough productive of sputum now feeling short of breath exam with diffuse wheezing bilaterally otherwise nonfocal concern for pneumonia not responding to outpatient treatments. He was also found to be in AF, rate ctr. Admit to tele (06 Apr 2024 13:31)    Multifocal pneumonia, suspect GNR PNA  -CT chest as above  -Continue Cefepime and Vanco  -ID consult appreciated, added azithromycin   -Pulm consult appreciated   -Breathing well on RA, afebrile   -Supportive care     New onset AF s/p cardioversion   -[id number]: 6 (age, DM, HTN, thromboembolism, MI)  -continue Xarelto   -EP consult noted, increased metoprolol ER to 50mg daily, s/p cardioversion 4/8  -TTE results pending   -Continue remote tele     Diabetes Mellitus type 2   -Hold home medications: metformin   -Hypoglycemia Protocol, ISS, Accuchecks AC&HS.  -Diet: Consistent Carbs w/ Evening Snack  -HbA1c 7.7%    HTN/HLD/CAD  -Continue home meds     H/O PE, CAD, IVF filter   -Continue Xarelto     VTE ppx: on Xarelto     Dispo: continue medical mangement  69-year-old male history of hypertension high cholesterol diabetes CAD status post stents PE on Xarelto presents the emergency department for shortness of breath and cough. Symptoms started yesterday went to urgent care was placed on doxycycline is taken 3 doses fever and cough getting worse.  Tmax to 103 cough productive of sputum now feeling short of breath exam with diffuse wheezing bilaterally otherwise nonfocal concern for pneumonia not responding to outpatient treatments. He was also found to be in AF, rate ctr. Admit to tele (06 Apr 2024 13:31)    Multifocal pneumonia, suspect GNR PNA  -CT chest as above  -Continue Cefepime and Vanco  -ID consult appreciated, added azithromycin, d/c on po doxycycline/po ceftin x 7 day course  -Pulm consult appreciated   -Breathing well on RA, afebrile   -Supportive care       New onset AF s/p cardioversion   -[id number]: 6 (age, DM, HTN, thromboembolism, MI)  -continue Xarelto   -EP consult noted, increased metoprolol ER to 50mg daily, s/p cardioversion 4/8, in NSR   -TTE as above    -Continue remote tele   -F/u with Dr. Hernandez on d/c    Diabetes Mellitus type 2   -Hold home medications: metformin   -Hypoglycemia Protocol, ISS, Accuchecks AC&HS.  -Diet: Consistent Carbs w/ Evening Snack  -HbA1c 7.7%    HTN/HLD/CAD  -Continue home meds     H/O PE, CAD, IVF filter   -Continue Xarelto     VTE ppx: on Xarelto     Dispo: d/c planning for tomorrow

## 2024-04-09 NOTE — PHARMACOTHERAPY INTERVENTION NOTE - REASON FOR NOTE
Inpatient Diabetes Management Team

## 2024-04-09 NOTE — DISCHARGE NOTE NURSING/CASE MANAGEMENT/SOCIAL WORK - PATIENT PORTAL LINK FT
You can access the FollowMyHealth Patient Portal offered by [facility] by registering at the following website: [email or web address]. By joining [employer]’s FollowMyHealth portal, you will also be able to view your health information using other applications (apps) compatible with our system.

## 2024-04-09 NOTE — PHARMACOTHERAPY INTERVENTION NOTE - COMMENTS
69y male admitted with Pneumonia due to infectious organism    HPI:  69-year-old male history of hypertension high cholesterol diabetes CAD status post stents PE on Xarelto presents the emergency department for shortness of breath and cough.  Symptoms started yesterday went to urgent care was placed on doxycycline is taken 3 doses fever and cough getting worse.  Tmax to 103 cough productive of sputum now feeling short of breath exam with diffuse wheezing bilaterally otherwise nonfocal concern for pneumonia not responding to outpatient treatments. He was also found to be in AF, rate ctr. Admit to tele (06 Apr 2024 13:31)    Pertinent PMH/PSH  HLD (hyperlipidemia)  CAD (coronary artery disease)  MI, old  HTN (hypertension)  LEA (obstructive sleep apnea)  Demyelinating neuropathy  DVT (deep venous thrombosis)  Personal history of PE (pulmonary embolism)  DM (diabetes mellitus)  Osteoarthritis  History of cholecystectomy  H/O Achilles tendon repair  History of percutaneous coronary intervention  S/P IVC filter  Carpal tunnel syndrome of right wrist    Home Medications:  allopurinol 100 mg oral tablet: 1 tab(s) orally once a day (06 Apr 2024 14:39)  doxycycline hyclate 100 mg oral capsule: 1 cap(s) orally 2 times a day for 10 days ***course not complete*** (06 Apr 2024 14:40)  enalapril 20 mg oral tablet: 1 tab(s) orally once a day (at bedtime) (06 Apr 2024 14:40)  ezetimibe-simvastatin 10 mg-40 mg oral tablet: 1 tab(s) orally once a day (06 Apr 2024 14:34)  Icosapent Ethyl 1 g oral capsule: 2 cap(s) orally 2 times a day (06 Apr 2024 14:31)  metFORMIN 1000 mg oral tablet: 1 tab(s) orally 2 times a day (with meals) (06 Apr 2024 14:44)  MethylPREDNISolone Dose Pack 4 mg oral tablet: orally use as directed  for 6 days ***course not complete*** (06 Apr 2024 14:44)  metoprolol succinate 25 mg oral tablet, extended release: 1 tab(s) orally once a day (06 Apr 2024 14:34)  potassium chloride 10 mEq oral tablet, extended release: 1 tab(s) orally once a day (06 Apr 2024 14:44)  SOLU-MEDROL Infusion: every 5 weeks (06 Apr 2024 14:44)  triamterene-hydrochlorothiazide 37.5 mg-25 mg oral capsule: 1 cap(s) orally once a day (in the morning) (06 Apr 2024 14:44)  Vitamin B-12 100 mcg oral tablet: 1 tab(s) orally once a day (06 Apr 2024 14:34)  Vitamin D2 2000 intl units oral capsule: 1 cap(s) orally once a day (06 Apr 2024 14:34)  Xarelto 20 mg oral tablet: 1 tab(s) orally once a day (in the evening) (06 Apr 2024 14:34)    A1C Result(s) Within Prior 3 Months: Pending    Renal Function Within Prior 72 Hours  Creatinine: 1.16 mg/dL (04-06-24 @ 11:51)  Creatinine: 0.81 mg/dL (04-07-24 @ 06:13)    Current Orders  insulin lispro (ADMELOG) corrective regimen sliding scale   SubCutaneous at bedtime  insulin lispro (ADMELOG) corrective regimen sliding scale   SubCutaneous three times a day before meals    POCT Within Prior 24 Hours  POCT Blood Glucose.: 259 mg/dL (04-06-24 @ 17:32)  POCT Blood Glucose.: 187 mg/dL (04-07-24 @ 08:40)  POCT Blood Glucose.: 240 mg/dL (04-07-24 @ 11:16)    Insulin Administration Within Prior 24 Hours  insulin lispro (ADMELOG) corrective regimen sliding scale   3 Unit(s) SubCutaneous (04-06-24 @ 17:35)   1 Unit(s) SubCutaneous (04-07-24 @ 08:41)   2 Unit(s) SubCutaneous (04-07-24 @ 11:49)    Other Administration(s) (i.e. Steroids, PO diabetes medications) Within Prior 24 Hours  methylPREDNISolone sodium succinate Injectable   125 milliGRAM(s) IV Push (04-06-24 @ 13:11)    vancomycin  IVPB   250 mL/Hr IV Intermittent (04-06-24 @ 22:18)   250 mL/Hr IV Intermittent (04-07-24 @ 09:56)    Height (cm): 177.8 (04-07-24 @ 14:26)  Weight (kg): 124.7 (04-07-24 @ 12:51)  BMI (kg/m2): 39.4 (04-07-24 @ 14:26)    Current Diet  Diet, NPO after Midnight:      NPO Start Date: 07-Apr-2024,   NPO Start Time: 23:59 (04-07-24 @ 13:44) [Active]  Diet, DASH/TLC:   Sodium & Cholesterol Restricted  Consistent Carbohydrate Evening Snack (CSTCHOSN) (04-07-24 @ 08:22) [Active]    Assessment/Plan:  - Patient with a history of Type 2 Diabetes Mellitus: Glycemic control to be managed by Inpatient Diabetes Management Team  - Patient follows Endocrinologist Danielle Cornejo outpatient for diabetes management  - A1C is pending: Patients treatment goal is A1C < 8.0% due to age and the presence of comorbid conditions per the 2023 ADA standards, currently on metformin 1000 mg BID outpatient [eGFR: 95]  - Patient is insulin naive, currently ordered a low intensity insulin correction scale TID AC and HS  - Fasting POCT 187 and pre-lunch POCT 240: Patients glycemic control is currently above protocol range of POCT 100-180  - Prior day POCT ranged NA, NA, 259, NA : Patient has required Admelog correction X, X, 3, X in the previous 24 hours  - Of note, patient is receiving antibiotics infused in 500 mL D5 Q12 hours.  May consider modifying to infuse in NS if patient remains hyperglycemic  - Will increase pre-meal insulin correction scale to moderate intensity and continue a low intensity insulin correction scale HS.  - Continue to monitor and titrate therapy to glycemic POCT target 100-180
69y male admitted with Pneumonia due to infectious organism    HPI:  69-year-old male with a PMHx of HTN, HLD, DM, CAD s/p stents, and PE on Xarelto presents with shortness of breath and cough x 1 day. He went to  and was placed on doxycycline, which he took 3 doses of. (+) Fever [Tmax 103], wheezing and worsening productive cough. (06 Apr 2024 13:31)    Pertinent PMH/PSH  HLD (hyperlipidemia)  CAD (coronary artery disease)  MI  HTN (hypertension)  LEA (obstructive sleep apnea)  Demyelinating neuropathy  DVT (deep venous thrombosis)  Personal history of PE (pulmonary embolism)  DM (diabetes mellitus)  Osteoarthritis  History of cholecystectomy  H/O Achilles tendon repair  History of percutaneous coronary intervention  S/P IVC filter  Carpal tunnel syndrome of right wrist    Home Medications:  allopurinol 100 mg oral tablet: 1 tab(s) orally once a day (06 Apr 2024 14:39)  doxycycline hyclate 100 mg oral capsule: 1 cap(s) orally 2 times a day for 10 days ***course not complete*** (06 Apr 2024 14:40)  enalapril 20 mg oral tablet: 1 tab(s) orally once a day (at bedtime) (06 Apr 2024 14:40)  ezetimibe-simvastatin 10 mg-40 mg oral tablet: 1 tab(s) orally once a day (06 Apr 2024 14:34)  Icosapent Ethyl 1 g oral capsule: 2 cap(s) orally 2 times a day (06 Apr 2024 14:31)  metFORMIN 1000 mg oral tablet: 1 tab(s) orally 2 times a day (with meals) (06 Apr 2024 14:44)  MethylPREDNISolone Dose Pack 4 mg oral tablet: orally use as directed  for 6 days ***course not complete*** (06 Apr 2024 14:44)  metoprolol succinate 25 mg oral tablet, extended release: 1 tab(s) orally once a day (06 Apr 2024 14:34)  potassium chloride 10 mEq oral tablet, extended release: 1 tab(s) orally once a day (06 Apr 2024 14:44)  SOLU-MEDROL Infusion: every 5 weeks (06 Apr 2024 14:44)  triamterene-hydrochlorothiazide 37.5 mg-25 mg oral capsule: 1 cap(s) orally once a day (in the morning) (06 Apr 2024 14:44)  Vitamin B-12 100 mcg oral tablet: 1 tab(s) orally once a day (06 Apr 2024 14:34)  Vitamin D2 2000 intl units oral capsule: 1 cap(s) orally once a day (06 Apr 2024 14:34)  Xarelto 20 mg oral tablet: 1 tab(s) orally once a day (in the evening) (06 Apr 2024 14:34)    A1C Result(s) Within Prior 3 Months  A1C with Estimated Average Glucose Result: 7.7 % (04-07-24 @ 06:13)    Renal Function Within Prior 72 Hours  Creatinine: 1.16 mg/dL (04-06-24 @ 11:51)  Creatinine: 0.81 mg/dL (04-07-24 @ 06:13)  Creatinine: 0.92 mg/dL (04-08-24 @ 06:37)  Creatinine: 1.04 mg/dL (04-09-24 @ 06:58)    Current Orders  insulin lispro (ADMELOG) corrective regimen sliding scale   SubCutaneous at bedtime  insulin lispro (ADMELOG) corrective regimen sliding scale   SubCutaneous three times a day before meals    POCT Within Prior 24 Hours  POCT Blood Glucose.: 173 mg/dL (04-07-24 @ 21:16)  POCT Blood Glucose.: 170 mg/dL (04-08-24 @ 08:07)  POCT Blood Glucose.: 197 mg/dL (04-08-24 @ 11:50)  POCT Blood Glucose.: 127 mg/dL (04-08-24 @ 16:49)  POCT Blood Glucose.: 163 mg/dL (04-08-24 @ 20:58)  POCT Blood Glucose.: 171 mg/dL (04-09-24 @ 08:02)    Insulin Administration Within Prior 24 Hours  insulin lispro (ADMELOG) corrective regimen sliding scale   2 Unit(s) SubCutaneous (04-08-24 @ 09:03)   2 Unit(s) SubCutaneous (04-08-24 @ 12:13)    Other Administration(s) (i.e. Steroids, PO diabetes medications) Within Prior 24 Hours  vancomycin  IVPB in D5W 500 mL   250 mL/Hr IV Intermittent (04-08-24 @ 09:08)   250 mL/Hr IV Intermittent (04-08-24 @ 21:41)    Height (cm): 177.8 (04-08-24 @ 12:28)  Weight (kg): 124 (04-08-24 @ 12:28)  BMI (kg/m2): 39.2 (04-08-24 @ 12:28)    Current Diet  Diet, DASH/TLC:   Sodium & Cholesterol Restricted  Consistent Carbohydrate Evening Snack (CSTCHOSN) (04-07-24 @ 08:22) [Active]    Assessment/Plan:  - Patient with a history of Type 2 Diabetes Mellitus: Glycemic control to be managed by Inpatient Diabetes Management Team  - Patient follows Endocrinologist Danielle Cornejo outpatient for diabetes management  - A1C is 7.7: Patients treatment goal is A1C < 8.0% due to age and the presence of comorbid conditions per the ADA standards, currently on metformin 1000 mg BID outpatient [eGFR: 78]  - Patient is insulin naive, currently ordered a moderate intensity insulin correction scale TID AC and a low intensity insulin correction scale HS  - Fasting POCT 171: Patients glycemic control is currently within protocol range of POCT 100-180  - Prior day POCT trended 170, 197, 127, 163: Patient has required Admelog correction 2/2/0/0  - Continue current management with insulin correction scale to titrate therapy to a glycemic target of POCT 100-180  - Of note, patient is receiving antibiotics infused in 500 mL D5 Q12 hours: Consider modifying to infuse in NS if patient remains hyperglycemic
Medication history complete. Medications and allergies reviewed with patient and confirmed with .
69y Male being treated for multilobar PNA with empiric vancomycin  Height (cm): 177.8 (04-08-24 @ 12:28)  Weight (kg): 124 (04-08-24 @ 12:28)    Creatinine: 0.92 mg/dL (04-08-24 @ 06:37)  Creatinine: 0.81 mg/dL (04-07-24 @ 06:13)  Creatinine: 1.16 mg/dL (04-06-24 @ 11:51)    Current Order:  vancomycin  IVPB 1500 milliGRAM(s) IV Intermittent every 12 hours    Vancomycin Levels:   vancomycin  IVPB: 250 mL/Hr IV Intermittent (04-06-24 @ 22:18)  vancomycin  IVPB: 250 mL/Hr IV Intermittent (04-07-24 @ 09:56)  vancomycin  IVPB: 250 mL/Hr IV Intermittent (04-07-24 @ 21:57)  vancomycin  IVPB: 250 mL/Hr IV Intermittent (04-08-24 @ 09:08)  vancomycin  IVPB: 250 mL/Hr IV Intermittent (04-08-24 @ 21:41)    Vancomycin Level, Trough: 14.3 ug/mL (04-08-24 @ 20:47)    Recommended vancomycin trough prior to the 4th dose of 1500 mg IV Q12H  Current Dose 1500 mg IV Q12hr (infused over 2 hr)  AUC/ASHLEY 460 mcg*hr/mL  Trough 14.1 mcg/mL  Continue current dosing  Re-check trough with change in clinical status/renal function    Plan on discharge is to switch IV antibiotics to Doxy+Ceftin x 7 day course per ID recommendations
69y male admitted with Pneumonia due to infectious organism    HPI:  69-year-old male with a PMHx of HTN, HLD, DM, CAD s/p stents, and PE on Xarelto presents with shortness of breath and cough x 1 day. He went to  and was placed on doxycycline, which he took 3 doses of. (+) Fever [Tmax 103], wheezing and worsening productive cough. (06 Apr 2024 13:31)    Pertinent PMH/PSH  HLD (hyperlipidemia)  CAD (coronary artery disease)  MI  HTN (hypertension)  LEA (obstructive sleep apnea)  Demyelinating neuropathy  DVT (deep venous thrombosis)  Personal history of PE (pulmonary embolism)  DM (diabetes mellitus)  Osteoarthritis  History of cholecystectomy  H/O Achilles tendon repair  History of percutaneous coronary intervention  S/P IVC filter  Carpal tunnel syndrome of right wrist    Home Medications:  allopurinol 100 mg oral tablet: 1 tab(s) orally once a day (06 Apr 2024 14:39)  doxycycline hyclate 100 mg oral capsule: 1 cap(s) orally 2 times a day for 10 days ***course not complete*** (06 Apr 2024 14:40)  enalapril 20 mg oral tablet: 1 tab(s) orally once a day (at bedtime) (06 Apr 2024 14:40)  ezetimibe-simvastatin 10 mg-40 mg oral tablet: 1 tab(s) orally once a day (06 Apr 2024 14:34)  Icosapent Ethyl 1 g oral capsule: 2 cap(s) orally 2 times a day (06 Apr 2024 14:31)  metFORMIN 1000 mg oral tablet: 1 tab(s) orally 2 times a day (with meals) (06 Apr 2024 14:44)  MethylPREDNISolone Dose Pack 4 mg oral tablet: orally use as directed  for 6 days ***course not complete*** (06 Apr 2024 14:44)  metoprolol succinate 25 mg oral tablet, extended release: 1 tab(s) orally once a day (06 Apr 2024 14:34)  potassium chloride 10 mEq oral tablet, extended release: 1 tab(s) orally once a day (06 Apr 2024 14:44)  SOLU-MEDROL Infusion: every 5 weeks (06 Apr 2024 14:44)  triamterene-hydrochlorothiazide 37.5 mg-25 mg oral capsule: 1 cap(s) orally once a day (in the morning) (06 Apr 2024 14:44)  Vitamin B-12 100 mcg oral tablet: 1 tab(s) orally once a day (06 Apr 2024 14:34)  Vitamin D2 2000 intl units oral capsule: 1 cap(s) orally once a day (06 Apr 2024 14:34)  Xarelto 20 mg oral tablet: 1 tab(s) orally once a day (in the evening) (06 Apr 2024 14:34)    A1C Result(s) Within Prior 3 Months  A1C with Estimated Average Glucose Result: 7.7 % (04-07-24 @ 06:13)    Renal Function Within Prior 72 Hours  Creatinine: 1.16 mg/dL (04-06-24 @ 11:51)  Creatinine: 0.81 mg/dL (04-07-24 @ 06:13)  Creatinine: 0.92 mg/dL (04-08-24 @ 06:37)    Current Orders  insulin lispro (ADMELOG) corrective regimen sliding scale   SubCutaneous three times a day before meals  insulin lispro (ADMELOG) corrective regimen sliding scale   SubCutaneous at bedtime    POCT Within Prior 24 Hours  POCT Blood Glucose.: 187 mg/dL (04-07-24 @ 08:40)  POCT Blood Glucose.: 240 mg/dL (04-07-24 @ 11:16)  POCT Blood Glucose.: 137 mg/dL (04-07-24 @ 17:00)  POCT Blood Glucose.: 173 mg/dL (04-07-24 @ 21:16)  POCT Blood Glucose.: 170 mg/dL (04-08-24 @ 08:07)  POCT Blood Glucose.: 197 mg/dL (04-08-24 @ 11:50)    Insulin Administration Within Prior 24 Hours  insulin lispro (ADMELOG) corrective regimen sliding scale   1 Unit(s) SubCutaneous (04-07-24 @ 08:41)   2 Unit(s) SubCutaneous (04-07-24 @ 11:49)   2 Unit(s) SubCutaneous (04-08-24 @ 09:03)   2 Unit(s) SubCutaneous (04-08-24 @ 12:13)    Other Administration(s) (i.e. Steroids, PO diabetes medications) Within Prior 24 Hours  methylPREDNISolone sodium succinate Injectable   125 milliGRAM(s) IV Push (04-06-24 @ 13:11)  vancomycin  IVPB in 500 mL D5W   250 mL/Hr IV Intermittent (04-07-24 @ 21:57)   250 mL/Hr IV Intermittent (04-08-24 @ 09:08)    Height (cm): 177.8 (04-08-24 @ 12:28)  Weight (kg): 124 (04-08-24 @ 12:28)  BMI (kg/m2): 39.2 (04-08-24 @ 12:28)    Current Diet  Diet, NPO after Midnight:      NPO Start Date: 07-Apr-2024,   NPO Start Time: 23:59 (04-07-24 @ 13:44) [Active]  Diet, DASH/TLC:   Sodium & Cholesterol Restricted  Consistent Carbohydrate Evening Snack (CSTCHOSN) (04-07-24 @ 08:22) [Active]    Assessment/Plan:  - Patient with a history of Type 2 Diabetes Mellitus: Glycemic control to be managed by Inpatient Diabetes Management Team  - Patient follows Endocrinologist Danielle Cornejo outpatient for diabetes management  - A1C is 7.7: Patients treatment goal is A1C < 8.0% due to age and the presence of comorbid conditions per the ADA standards, currently on metformin 1000 mg BID outpatient [eGFR: 90]  - Patient is insulin naive, currently ordered a moderate intensity insulin correction scale TID AC and a low intensity insulin correction scale HS  - Fasting POCT 170 [Within protocol range] and pre-lunch POCT 197 [Above protocol range]  - Prior day POCT trended 187, 240, 137, 173: Patient has required Admelog correction 1/2/0/0  - Patient is NPO today pending DCCV procedure  - POCT > 180 x 1: Continue current management with insulin correction scale to titrate therapy to glycemic POCT target 100-180  - Of note, patient is receiving antibiotics infused in 500 mL D5 Q12 hours: Consider modifying to infuse in NS if patient remains hyperglycemic

## 2024-04-09 NOTE — CHART NOTE - NSCHARTNOTEFT_GEN_A_CORE
69-year-old male history of hypertension high cholesterol diabetes CAD status post stents PE on Xarelto p/w cough dyspnea found to have multilobe PNA on CT and enlarged mediastinal lymph nodes and newly dx of AF  TTE 4.2024 shows LVEF 60% mild LAE.     Pt will f/u with Dr. Hernandez as an out patient.  He was given Dr. Hernandez's contact information.

## 2024-04-09 NOTE — DISCHARGE NOTE NURSING/CASE MANAGEMENT/SOCIAL WORK - NSDCPEFALRISK_GEN_ALL_CORE
For information on Fall & Injury Prevention, visit: [email or web address] OR  [email or web address] OR  https://www.cdc.gov/steadi/patient.html

## 2024-04-10 ENCOUNTER — TRANSCRIPTION ENCOUNTER (OUTPATIENT)
Age: 69
End: 2024-04-10

## 2024-04-10 VITALS
RESPIRATION RATE: 18 BRPM | TEMPERATURE: 98 F | DIASTOLIC BLOOD PRESSURE: 87 MMHG | OXYGEN SATURATION: 92 % | HEART RATE: 87 BPM | SYSTOLIC BLOOD PRESSURE: 130 MMHG

## 2024-04-10 DIAGNOSIS — J18.9 PNEUMONIA, UNSPECIFIED ORGANISM: ICD-10-CM

## 2024-04-10 LAB
GLUCOSE BLDC GLUCOMTR-MCNC: 157 MG/DL — HIGH (ref 70–99)
GLUCOSE BLDC GLUCOMTR-MCNC: 183 MG/DL — HIGH (ref 70–99)
LEGIONELLA AG UR QL: NEGATIVE — SIGNIFICANT CHANGE UP

## 2024-04-10 PROCEDURE — 99223 1ST HOSP IP/OBS HIGH 75: CPT

## 2024-04-10 PROCEDURE — 99238 HOSP IP/OBS DSCHRG MGMT 30/<: CPT

## 2024-04-10 RX ORDER — CEFUROXIME AXETIL 250 MG
1 TABLET ORAL
Qty: 10 | Refills: 0
Start: 2024-04-10 | End: 2024-04-14

## 2024-04-10 RX ORDER — ALBUTEROL 90 UG/1
2 AEROSOL, METERED ORAL
Qty: 1 | Refills: 0
Start: 2024-04-10 | End: 2024-05-09

## 2024-04-10 RX ORDER — BUDESONIDE AND FORMOTEROL FUMARATE DIHYDRATE 160; 4.5 UG/1; UG/1
2 AEROSOL RESPIRATORY (INHALATION)
Qty: 1 | Refills: 0
Start: 2024-04-10 | End: 2024-05-09

## 2024-04-10 RX ADMIN — Medication 20 MILLIGRAM(S): at 10:19

## 2024-04-10 RX ADMIN — Medication 1 TABLET(S): at 10:19

## 2024-04-10 RX ADMIN — Medication 2: at 12:00

## 2024-04-10 RX ADMIN — Medication 100 MILLIGRAM(S): at 10:19

## 2024-04-10 RX ADMIN — BUDESONIDE AND FORMOTEROL FUMARATE DIHYDRATE 2 PUFF(S): 160; 4.5 AEROSOL RESPIRATORY (INHALATION) at 08:03

## 2024-04-10 RX ADMIN — Medication 50 MILLIGRAM(S): at 10:20

## 2024-04-10 RX ADMIN — Medication 2: at 08:08

## 2024-04-10 RX ADMIN — CEFEPIME 2000 MILLIGRAM(S): 1 INJECTION, POWDER, FOR SOLUTION INTRAMUSCULAR; INTRAVENOUS at 06:18

## 2024-04-10 NOTE — DISCHARGE NOTE PROVIDER - HOSPITAL COURSE
69-year-old male history of hypertension high cholesterol diabetes CAD status post stents PE on Xarelto presents the emergency department for shortness of breath and cough.  Symptoms started so he went to urgent care was placed on doxycycline is taken 3 doses fever and cough getting worse.  Tmax to 103 cough productive of sputum now feeling short of breath exam with diffuse wheezing bilaterally otherwise nonfocal concern for pneumonia not responding to outpatient treatments. He was also found to be in AF, rate ctr. Admit to tele (06 Apr 2024 13:31)  Called our answering service, was recommended to go to the ER  CTA performed- Patent central airways.  Bilateral multi lobar  peribronchial consolidative opacities, worse in right lower lobe with   diffuse right lower lobar interlobular septal thickening, peribronchial nodular opacities present in all lobes with diffuse peribronchial wall   thickening. Reactive adenopathy noted as well as a small right sided pleural effusion  he has a history of LEA, cleans the CPAP very well  Mild asthma- only on albuterol PRN at home        Medical progress: Denies any HA, CP, SOB. No fevers, chills or shakes. Labs and vitals reviewed. Patient is comfortable.   Complaints: no new complaints  State of mind: normal    Care discussed with Dr. Morocho. 5 more days of oral antibiotics strongly recommended.

## 2024-04-10 NOTE — CONSULT NOTE ADULT - NS PRO AD ANY ON CHART
Quality 111:Pneumonia Vaccination Status For Older Adults: Pneumococcal Vaccination Previously Received Detail Level: Detailed Yes Quality 431: Preventive Care And Screening: Unhealthy Alcohol Use - Screening: Patient screened for unhealthy alcohol use using a single question and scores less than 2 times per year Quality 402: Tobacco Use And Help With Quitting Among Adolescents: Patient screened for tobacco and is an ex-smoker Quality 110: Preventive Care And Screening: Influenza Immunization: Influenza Immunization Administered during Influenza season Quality 226: Preventive Care And Screening: Tobacco Use: Screening And Cessation Intervention: Patient screened for tobacco use and is an ex/non-smoker Quality 131: Pain Assessment And Follow-Up: Pain assessment using a standardized tool is documented as negative, no follow-up plan required Quality 130: Documentation Of Current Medications In The Medical Record: Current Medications Documented

## 2024-04-10 NOTE — CONSULT NOTE ADULT - CONVERSATION DETAILS
The role of Palliative medicine was reviewed with the pt. He was admitted for PNA and was found to have Afib. He feels good and os hoping to be disch home. He has an active lifestyle as he and his wife care for 5 grandchildren and have family around. They also socialize with friends often. He has a HCP and LW on file. States that his wife knows his wishes and would want a trial of CPR/Intubation at this time.

## 2024-04-10 NOTE — CONSULT NOTE ADULT - SUBJECTIVE AND OBJECTIVE BOX
69-year-old male history of hypertension high cholesterol diabetes CAD status post stents PE on Xarelto presents the emergency department for shortness of breath and cough. Went to urgent care was placed on doxycycline , fever and cough getting worse.  Tmax to 103 cough productive of sputum now feeling short of breath exam with diffuse wheezing bilaterally otherwise nonfocal concern for pneumonia not responding to outpatient treatment. He was also found to be in AF, rate ctr. Admit to tele. Palliative Consult to further discuss [facility]  4/10/24 Seen and examined at bedside. Awaiting disch home with no complaints     PAIN: ( )Yes   ( X)No    DYSPNEA: ( ) Yes  (X ) No    PAST MEDICAL & SURGICAL HISTORY:  HLD (hyperlipidemia)  CAD (coronary artery disease)  MI, old  x2- stentx1  HTN (hypertension)  LEA (obstructive sleep apnea)  CPAP  Demyelinating neuropathy  DVT (deep venous thrombosis)  unprovoked DVT/PE- as per patient his mother and sister also had DVT- genetic testing was done but negative  Personal history of PE (pulmonary embolism)  DM (diabetes mellitus)  Osteoarthritis  History of cholecystectomy  H/O Achilles tendon repair  History of percutaneous coronary intervention  RCA stent 2008  S/P IVC filter  7/8/2021  Carpal tunnel syndrome of right wrist  2019    SOCIAL HX:  Lives home with wife  Hx opiate tolerance ( )YES  (X )NO    Baseline ADLs  (Prior to Admission)  (X ) Independent   ( )Dependent    FAMILY HISTORY:  FH: pancreatic cancer (Father)  family history of DVT - mother and sister- both on coumadin        Review of Systems:    Depression-denies  Physical Discomfort-denies  Dyspnea-on exertion  Anorexia-denies  Cough-mod  Weakness-denies    All other systems reviewed and negative      PHYSICAL EXAM:    Vital Signs Last 24 Hrs  T(C): 36.5 (10 Apr 2024 09:04), Max: 36.9 (09 Apr 2024 15:06)  T(F): 97.7 (10 Apr 2024 09:04), Max: 98.4 (09 Apr 2024 15:06)  HR: 87 (10 Apr 2024 09:04) (72 - 87)  BP: 130/87 (10 Apr 2024 09:04) (130/87 - 138/65)  RR: 18 (10 Apr 2024 09:04) (18 - 18)  SpO2: 92% (10 Apr 2024 09:04) (92% - 94%)  Parameters below as of 10 Apr 2024 09:04  Patient On (Oxygen Delivery Method): room air    PPSV2:  50 %    General: Elderly male in bed in NAD  Mental Status: A&O X3  HEENT: oral mucosa moist  Lungs: clear to auscultation erich  Cardiac: S1S2+  GI: abd soft Nt ND + BS  : voids  Ext: moves on bed  Neuro: no focal def      LABS:    04-09    140  |  109<H>  |  22  ----------------------------<  176<H>  3.9   |  25  |  1.04    Ca    9.4      09 Apr 2024 06:58  Phos  4.0     04-09  Mg     2.4     04-09    Albumin: Albumin: 3.2 g/dL (04-06 @ 11:51)      Allergies    No Known Allergies    Intolerances      MEDICATIONS  (STANDING):  allopurinol 100 milliGRAM(s) Oral daily  atorvastatin 20 milliGRAM(s) Oral at bedtime  budesonide 160 MICROgram(s)/formoterol 4.5 MICROgram(s) Inhaler 2 Puff(s) Inhalation two times a day  cefepime  Injectable. 2000 milliGRAM(s) IV Push every 8 hours  dextrose 10% Bolus 125 milliLiter(s) IV Bolus once  dextrose 5%. 1000 milliLiter(s) (50 mL/Hr) IV Continuous <Continuous>  dextrose 5%. 1000 milliLiter(s) (100 mL/Hr) IV Continuous <Continuous>  dextrose 50% Injectable 25 Gram(s) IV Push once  dextrose 50% Injectable 12.5 Gram(s) IV Push once  enalapril 20 milliGRAM(s) Oral daily  glucagon  Injectable 1 milliGRAM(s) IntraMuscular once  insulin lispro (ADMELOG) corrective regimen sliding scale   SubCutaneous at bedtime  insulin lispro (ADMELOG) corrective regimen sliding scale   SubCutaneous three times a day before meals  latanoprost 0.005% Ophthalmic Solution 1 Drop(s) Both EYES at bedtime  metoprolol succinate ER 50 milliGRAM(s) Oral daily  rivaroxaban 20 milliGRAM(s) Oral with dinner  triamterene 37.5 mG/hydrochlorothiazide 25 mG Tablet 1 Tablet(s) Oral daily  vancomycin  IVPB 1500 milliGRAM(s) IV Intermittent every 12 hours    MEDICATIONS  (PRN):  acetaminophen     Tablet .. 650 milliGRAM(s) Oral every 6 hours PRN Temp greater or equal to 38C (100.4F), Mild Pain (1 - 3)  albuterol    90 MICROgram(s) HFA Inhaler 2 Puff(s) Inhalation every 6 hours PRN Wheezing  aluminum hydroxide/magnesium hydroxide/simethicone Suspension 30 milliLiter(s) Oral every 4 hours PRN Dyspepsia  dextrose Oral Gel 15 Gram(s) Oral once PRN Blood Glucose LESS THAN 70 milliGRAM(s)/deciliter  diphenhydrAMINE 25 milliGRAM(s) Oral every 8 hours PRN Rash and/or Itching  melatonin 3 milliGRAM(s) Oral at bedtime PRN Insomnia  ondansetron Injectable 4 milliGRAM(s) IV Push every 8 hours PRN Nausea and/or Vomiting      RADIOLOGY/ADDITIONAL STUDIES:      < from: CT Angio Chest PE Protocol w/ IV Cont (04.06.24 @ 12:59) >    ACC: [id number] EXAM:  CT ANGIO CHEST PULM ART WAWIC   ORDERED BY: LUIS SOTO     PROCEDURE DATE:  04/06/2024          INTERPRETATION:  CLINICAL INFORMATION: Shortness of breath and cough,   worsening fever and cough, productive sputum. History ofpulmonary   embolism.    COMPARISON: CT pulmonary injury of August 24, 2023. CT pulmonary   angiogram October 26, 2019.    CONTRAST/COMPLICATIONS:  IV Contrast: Omnipaque 350  90 cc administered   0 cc discarded  Oral Contrast: NONE  Complications: None reported at time of study completion    PROCEDURE:  CT Angiography of the Chest.  Sagittal and coronal reformats were performed as well as 3D (MIP)   reconstructions.    FINDINGS:    LUNGS AND AIRWAYS: Patent central airways.  Bilateral multi lobar   peribronchial consolidative opacities, worse in right lower lobe with   diffuse right lower lobar interlobular septal thickening, peribronchial   nodular opacities present in all lobes with diffuse peribronchial wall   thickening.  PLEURA: Small right pleural effusion.  MEDIASTINUM AND KENDALL: Few enlarged mediastinal and hilar nodes, for   example right inferior hilar 1.7 x 1.6 cm and right paratracheal 1.9 x   1.2 cm.  VESSELS: No evidence of pulmonary thromboembolism. Coronary artery   stenting and calcifications. Aortic atherosclerotic changes. No aortic   aneurysm.  HEART: Heart size is normal. No pericardial effusion.  CHEST WALL AND LOWER NECK: Within normal limits.  VISUALIZED UPPER ABDOMEN: Cholecystectomy. Gastric fundal diverticulum.  BONES: Degenerative changes.    IMPRESSION:  No pulmonary thromboembolism.  Bilateral multi lobar pneumonia.  Small parapneumonic right pleural effusion.  Multiple enlarged mediastinal and hilar lymph nodes, favor   reactive/inflammatory.    --- End of Report ---

## 2024-04-10 NOTE — PROGRESS NOTE ADULT - SUBJECTIVE AND OBJECTIVE BOX
Patient is a 69y old  Male who presents with a chief complaint of PNA (07 Apr 2024 09:13)      HPI:  69-year-old male history of hypertension high cholesterol diabetes CAD status post stents PE on Xarelto presents the emergency department for shortness of breath and cough.  Symptoms started so he went to urgent care was placed on doxycycline is taken 3 doses fever and cough getting worse.  Tmax to 103 cough productive of sputum now feeling short of breath exam with diffuse wheezing bilaterally otherwise nonfocal concern for pneumonia not responding to outpatient treatments. He was also found to be in AF, rate ctr. Admit to tele (06 Apr 2024 13:31)  Called our answering service, was recommended to go to the ER  CTA performed- Patent central airways.  Bilateral multi lobar  peribronchial consolidative opacities, worse in right lower lobe with   diffuse right lower lobar interlobular septal thickening, peribronchial nodular opacities present in all lobes with diffuse peribronchial wall   thickening. Reactive adenopathy noted as well as a small right sided pleural effusion  he has a history of LEA, cleans the CPAP very well  Mild asthma- only on albuterol PRN at home     4/10  Patient is s/p cardioversion  No acute pulmonary events occurred overnight  Not on O2  Less dyspneic    MEDICATIONS  (STANDING):  allopurinol 100 milliGRAM(s) Oral daily  atorvastatin 20 milliGRAM(s) Oral at bedtime  budesonide 160 MICROgram(s)/formoterol 4.5 MICROgram(s) Inhaler 2 Puff(s) Inhalation two times a day  cefepime  Injectable. 2000 milliGRAM(s) IV Push every 8 hours  dextrose 10% Bolus 125 milliLiter(s) IV Bolus once  dextrose 5%. 1000 milliLiter(s) (50 mL/Hr) IV Continuous <Continuous>  dextrose 5%. 1000 milliLiter(s) (100 mL/Hr) IV Continuous <Continuous>  dextrose 50% Injectable 25 Gram(s) IV Push once  dextrose 50% Injectable 12.5 Gram(s) IV Push once  enalapril 20 milliGRAM(s) Oral daily  glucagon  Injectable 1 milliGRAM(s) IntraMuscular once  insulin lispro (ADMELOG) corrective regimen sliding scale   SubCutaneous at bedtime  insulin lispro (ADMELOG) corrective regimen sliding scale   SubCutaneous three times a day before meals  latanoprost 0.005% Ophthalmic Solution 1 Drop(s) Both EYES at bedtime  metoprolol succinate ER 50 milliGRAM(s) Oral daily  rivaroxaban 20 milliGRAM(s) Oral with dinner  triamterene 37.5 mG/hydrochlorothiazide 25 mG Tablet 1 Tablet(s) Oral daily  vancomycin  IVPB 1500 milliGRAM(s) IV Intermittent every 12 hours    MEDICATIONS  (PRN):  acetaminophen     Tablet .. 650 milliGRAM(s) Oral every 6 hours PRN Temp greater or equal to 38C (100.4F), Mild Pain (1 - 3)  albuterol    90 MICROgram(s) HFA Inhaler 2 Puff(s) Inhalation every 6 hours PRN Wheezing  aluminum hydroxide/magnesium hydroxide/simethicone Suspension 30 milliLiter(s) Oral every 4 hours PRN Dyspepsia  dextrose Oral Gel 15 Gram(s) Oral once PRN Blood Glucose LESS THAN 70 milliGRAM(s)/deciliter  diphenhydrAMINE 25 milliGRAM(s) Oral every 8 hours PRN Rash and/or Itching  melatonin 3 milliGRAM(s) Oral at bedtime PRN Insomnia  ondansetron Injectable 4 milliGRAM(s) IV Push every 8 hours PRN Nausea and/or Vomiting             Vital Signs Last 24 Hrs  T(C): 36.9 (09 Apr 2024 22:00), Max: 36.9 (09 Apr 2024 15:06)  T(F): 98.4 (09 Apr 2024 22:00), Max: 98.4 (09 Apr 2024 15:06)  HR: 72 (10 Apr 2024 08:05) (72 - 83)  BP: 135/89 (09 Apr 2024 22:00) (135/89 - 138/65)  BP(mean): --  RR: 18 (09 Apr 2024 22:00) (18 - 18)  SpO2: 93% (09 Apr 2024 22:00) (93% - 94%)    Parameters below as of 09 Apr 2024 22:00  Patient On (Oxygen Delivery Method): room air        I&O's Summary    PHYSICAL EXAM  General Appearance: cooperative, no acute distress,   HEENT: PERRL, conjunctiva clear, EOM's intact, non injected pharynx, no exudate, TM   normal  Neck: Supple, , no adenopathy, thyroid: not enlarged, no carotid bruit or JVD  Back: Symmetric, no  tenderness,no soft tissue tenderness  Lungs: coarse at the bases   Heart: Regular rate and rhythm, S1, S2 normal, no murmur, rub or gallop  Abdomen: Soft, non-tender, bowel sounds active , no hepatosplenomegaly  Extremities: no cyanosis or edema, no joint swelling  Skin: Skin color, texture normal, no rashes   Neurologic: Alert and oriented X3 , cranial nerves intact, sensory and motor normal,    ECG:    LABS:                          13.0   10.04 )-----------( 159      ( 07 Apr 2024 06:13 )             38.5     04-07    139  |  109<H>  |  17  ----------------------------<  227<H>  4.3   |  22  |  0.81    Ca    8.9      07 Apr 2024 06:13    TPro  7.3  /  Alb  3.2<L>  /  TBili  2.1<H>  /  DBili  x   /  AST  24  /  ALT  21  /  AlkPhos  36<L>  04-06            PT/INR - ( 06 Apr 2024 11:51 )   PT: 13.9 sec;   INR: 1.24 ratio         PTT - ( 06 Apr 2024 11:51 )  PTT:27.3 sec  Urinalysis Basic - ( 07 Apr 2024 06:13 )    Color: x / Appearance: x / SG: x / pH: x  Gluc: 227 mg/dL / Ketone: x  / Bili: x / Urobili: x   Blood: x / Protein: x / Nitrite: x   Leuk Esterase: x / RBC: x / WBC x   Sq Epi: x / Non Sq Epi: x / Bacteria: x            RADIOLOGY & ADDITIONAL STUDIES:

## 2024-04-10 NOTE — PROGRESS NOTE ADULT - SUBJECTIVE AND OBJECTIVE BOX
Date of service: 04-10-24 @ 11:22    pt seen and examined  feels much better  walking on floor  less cough no distress   afebrile    ROS: no fever or chills; denies dizziness, no HA, no abdominal pain, no diarrhea or constipation; no dysuria, no urinary frequency, no legs pain, no rashes      MEDICATIONS  (STANDING):  allopurinol 100 milliGRAM(s) Oral daily  atorvastatin 20 milliGRAM(s) Oral at bedtime  budesonide 160 MICROgram(s)/formoterol 4.5 MICROgram(s) Inhaler 2 Puff(s) Inhalation two times a day  cefepime  Injectable. 2000 milliGRAM(s) IV Push every 8 hours  dextrose 10% Bolus 125 milliLiter(s) IV Bolus once  dextrose 5%. 1000 milliLiter(s) (50 mL/Hr) IV Continuous <Continuous>  dextrose 5%. 1000 milliLiter(s) (100 mL/Hr) IV Continuous <Continuous>  dextrose 50% Injectable 25 Gram(s) IV Push once  dextrose 50% Injectable 12.5 Gram(s) IV Push once  enalapril 20 milliGRAM(s) Oral daily  glucagon  Injectable 1 milliGRAM(s) IntraMuscular once  insulin lispro (ADMELOG) corrective regimen sliding scale   SubCutaneous three times a day before meals  insulin lispro (ADMELOG) corrective regimen sliding scale   SubCutaneous at bedtime  latanoprost 0.005% Ophthalmic Solution 1 Drop(s) Both EYES at bedtime  metoprolol succinate ER 50 milliGRAM(s) Oral daily  rivaroxaban 20 milliGRAM(s) Oral with dinner  triamterene 37.5 mG/hydrochlorothiazide 25 mG Tablet 1 Tablet(s) Oral daily  vancomycin  IVPB 1500 milliGRAM(s) IV Intermittent every 12 hours    Vital Signs Last 24 Hrs  T(C): 36.5 (10 Apr 2024 09:04), Max: 36.9 (09 Apr 2024 15:06)  T(F): 97.7 (10 Apr 2024 09:04), Max: 98.4 (09 Apr 2024 15:06)  HR: 87 (10 Apr 2024 09:04) (72 - 87)  BP: 130/87 (10 Apr 2024 09:04) (130/87 - 138/65)  BP(mean): --  RR: 18 (10 Apr 2024 09:04) (18 - 18)  SpO2: 92% (10 Apr 2024 09:04) (92% - 94%)    Parameters below as of 10 Apr 2024 09:04  Patient On (Oxygen Delivery Method): room air      PE:  Constitutional: NAD   HEENT: NC/AT, EOMI, PERRLA, conjunctivae clear; ears and nose atraumatic; pharynx benign  Neck: supple; thyroid not palpable  Back: no tenderness  Respiratory: decreased breath sounds, rhonchi   Cardiovascular: S1S2 regular, no murmurs  Abdomen: soft, not tender, not distended, positive BS; liver and spleen WNL  Genitourinary: no suprapubic tenderness  Lymphatic: no LN palpable  Musculoskeletal: no muscle tenderness, no joint swelling or tenderness  Extremities: no pedal edema  Neurological/ Psychiatric: AxOx3, Judgement and insight normal;  moving all extremities  Skin: no rashes; no palpable lesions    Labs: all available labs reviewed               04-09    140  |  109<H>  |  22  ----------------------------<  176<H>  3.9   |  25  |  1.04    Ca    9.4      09 Apr 2024 06:58  Phos  4.0     04-09  Mg     2.4     04-09         Vancomycin Level, Trough: 14.3 ug/mL (04-08 @ 20:47)        Urinalysis Basic - ( 07 Apr 2024 06:13 )    Color: x / Appearance: x / SG: x / pH: x  Gluc: 227 mg/dL / Ketone: x  / Bili: x / Urobili: x   Blood: x / Protein: x / Nitrite: x   Leuk Esterase: x / RBC: x / WBC x   Sq Epi: x / Non Sq Epi: x / Bacteria: x      Culture - Sputum . (04.07.24 @ 17:05)   Gram Stain:   Rare polymorphonuclear leukocytes per low power field   No Squamous epithelial cells per low power field   No organisms seen per oil power field  Specimen Source: .Sputum Sputum  Culture - Blood (04.06.24 @ 12:05)   Specimen Source: .Blood None  Culture Results:   No growth at 24 hours  Culture - Blood (04.06.24 @ 11:51)   Specimen Source: .Blood None  Culture Results:   No growth at 24 hours    Radiology: all available radiological tests reviewed      ACC: [id number] EXAM:  CT ANGIO CHEST PULM ART WAWIC   ORDERED BY: LUIS SOTO     PROCEDURE DATE:  04/06/2024          INTERPRETATION:  CLINICAL INFORMATION: Shortness of breath and cough,   worsening fever and cough, productive sputum. History ofpulmonary   embolism.    COMPARISON: CT pulmonary injury of August 24, 2023. CT pulmonary   angiogram October 26, 2019.    CONTRAST/COMPLICATIONS:  IV Contrast: Omnipaque 350  90 cc administered   0 cc discarded  Oral Contrast: NONE  Complications: None reported at time of study completion    PROCEDURE:  CT Angiography of the Chest.  Sagittal and coronal reformats were performed as well as 3D (MIP)   reconstructions.    FINDINGS:    LUNGS AND AIRWAYS: Patent central airways.  Bilateral multi lobar   peribronchial consolidative opacities, worse in right lower lobe with   diffuse right lower lobar interlobular septal thickening, peribronchial   nodular opacities present in all lobes with diffuse peribronchial wall   thickening.  PLEURA: Small right pleural effusion.  MEDIASTINUM AND KENDALL: Few enlarged mediastinal and hilar nodes, for   example right inferior hilar 1.7 x 1.6 cm and right paratracheal 1.9 x   1.2 cm.  VESSELS: No evidence of pulmonary thromboembolism. Coronary artery   stenting and calcifications. Aortic atherosclerotic changes. No aortic   aneurysm.  HEART: Heart size is normal. No pericardial effusion.  CHEST WALL AND LOWER NECK: Within normal limits.  VISUALIZED UPPER ABDOMEN: Cholecystectomy. Gastric fundal diverticulum.  BONES: Degenerative changes.    IMPRESSION:  No pulmonary thromboembolism.  Bilateral multi lobar pneumonia.  Small parapneumonic right pleural effusion.  Multiple enlarged mediastinal and hilar lymph nodes, favor   reactive/inflammatory.        Advanced directives addressed: full resuscitation

## 2024-04-10 NOTE — DISCHARGE NOTE PROVIDER - NSDCMRMEDTOKEN_GEN_ALL_CORE_FT
albuterol 90 mcg/inh inhalation aerosol: 2 puff(s) inhaled every 6 hours as needed for Wheezing  allopurinol 100 mg oral tablet: 1 tab(s) orally once a day  cefuroxime 500 mg oral tablet: 1 tab(s) orally 2 times a day  enalapril 20 mg oral tablet: 1 tab(s) orally once a day (at bedtime)  ezetimibe-simvastatin 10 mg-40 mg oral tablet: 1 tab(s) orally once a day  Icosapent Ethyl 1 g oral capsule: 2 cap(s) orally 2 times a day  metFORMIN 1000 mg oral tablet: 1 tab(s) orally 2 times a day (with meals)  metoprolol succinate 25 mg oral tablet, extended release: 1 tab(s) orally once a day  potassium chloride 10 mEq oral tablet, extended release: 1 tab(s) orally once a day  triamterene-hydrochlorothiazide 37.5 mg-25 mg oral capsule: 1 cap(s) orally once a day (in the morning)  Vitamin B-12 100 mcg oral tablet: 1 tab(s) orally once a day  Vitamin D2 2000 intl units oral capsule: 1 cap(s) orally once a day  Xarelto 20 mg oral tablet: 1 tab(s) orally once a day (in the evening)

## 2024-04-10 NOTE — CONSULT NOTE ADULT - ASSESSMENT
69-year-old male history of hypertension high cholesterol diabetes CAD status post stents PE on Xarelto presents the emergency department for shortness of breath and cough. Went to urgent care was placed on doxycycline , fever and cough getting worse.  Tmax to 103 cough productive of sputum now feeling short of breath exam with diffuse wheezing bilaterally otherwise nonfocal concern for pneumonia not responding to outpatient treatment. He was also found to be in AF, rate ctr. Admit to tele. Palliative Consult to further discuss GOC  4/10/24 Seen and examined at bedside. Awaiting disch home with no complaints     Assessment and Plan:    1) PNA  - bilateral, multifocal  -Abx as per medicine  -consult ID  -nebs PRN  -Pulm eval noted    2) New onset AF  -? sec to fever, PNA  -S/P cardioversion  -c/w Eliquis and BB  -Cardio eval noted    3) H/O PE  - CAD  -c/w AC    4) Advanced Directives  -Pt with capacity  -Wife Iva named [facility]  -LW on file  -GOC done  -No limits set    Time Spent: 75 minutes including the care, coordination and counseling of this patient, 50% of which was spent coordinating and counseling.

## 2024-04-10 NOTE — DISCHARGE NOTE PROVIDER - CARE PROVIDER_API CALL
Reinier Morocho  Pulmonary Disease  [address] [place] [address]  Phone: [phone]  Fax: [phone]  Follow Up Time:

## 2024-04-10 NOTE — DISCHARGE NOTE PROVIDER - NSDCFUADDAPPT_GEN_ALL_CORE_FT
Zeus Huerta was seen and treated in our emergency department on 11/27/2022  No restrictions            Diagnosis:     Latasha Screen  may return to work on return date  She may return on this date: 11/29/2022         If you have any questions or concerns, please don't hesitate to call        Louise Speaker, DO    ______________________________           _______________          _______________  Hospital Representative                              Date                                Time Pulmonary Follow Up Appointment  Dr. Morocho's office  04/10/2024  @ 1:15PM  [address] [id number]

## 2024-04-10 NOTE — PROGRESS NOTE ADULT - ASSESSMENT
69-year-old male history of hypertension high cholesterol diabetes CAD status post stents PE on Xarelto presents the emergency department for shortness of breath and cough.  Symptoms started day prior to admit- went to urgent care was placed on doxycycline is taken 3 doses fever and cough getting worse.  Tmax to 103 cough productive of sputum now feeling short of breath exam with diffuse wheezing bilaterally otherwise nonfocal concern for pneumonia not responding to outpatient treatments. He was also found to be in AF, rate ctr. Admit to tele. Imaging shows  No pulmonary thromboembolism. Bilateral multi lobar pneumonia. Small parapneumonic right pleural effusion. Multiple enlarged mediastinal and hilar lymph nodes, favor reactive/inflammatory. Started on IV vancomycin/cefepime/azithromycin.     1. Fever. Dyspnea. Multilobar pneumonia. R pleural effusion. Asthma.   - imaging reviewed   - s/p azithromycin 500mg daily #3  - on vancomycin [id number] trough therapeutic #4-5  - on cefepime 2gmq8h #4-5  - continue with antibiotic coverage  - f/u cultures- blood cx no growth  - f/u sputum cx - wnl, legionella ag negative   - fu cbc  - tolerating abx well so far; no side effects noted  - reason for abx use and side effects reviewed with patient  - supportive care    Clinical team may change from intravenous to oral antibiotics when the following criteria are met:   1. Patient is clinically improving/stable       a)	Improved signs and symptoms of infection from initial presentation       b)	Afebrile for 24 hours       c)	Leukocytosis trending towards normal range   2. Patient is tolerating oral intake   3. Initial/repeat blood cultures are negative      change iv antibiotics to: po doxycycline/po ceftin x 7 day course

## 2024-04-10 NOTE — PROGRESS NOTE ADULT - ASSESSMENT
1) Multilobar Pneumonia  2) Dyspnea  3) Abnormal CT Chest  4) Asthma   5) Cough  6) PE History     69-year-old male history of hypertension high cholesterol diabetes CAD status post stents PE on Xarelto presents the emergency department for shortness of breath and cough.  Symptoms started so he went to urgent care was placed on doxycycline is taken 3 doses fever and cough getting worse.  Tmax to 103 cough productive of sputum now feeling short of breath exam with diffuse wheezing bilaterally otherwise nonfocal concern for pneumonia not responding to outpatient treatments. He was also found to be in AF, rate ctr. Admit to tele (06 Apr 2024 13:31)  Called our answering service, was recommended to go to the ER  CTA performed- Patent central airways.  Bilateral multi lobar  peribronchial consolidative opacities, worse in right lower lobe with   diffuse right lower lobar interlobular septal thickening, peribronchial nodular opacities present in all lobes with diffuse peribronchial wall   thickening. Reactive adenopathy noted as well as a small right sided pleural effusion  he has a history of LEA, cleans the CPAP very well  Mild asthma- only on albuterol PRN at home   Completed Vancomycin and Cefepime  Reviewed CT Chest independently and discussed with patient   Symbicort + Albuterol PRN  Continue nocturnal CPAP  On Xarelto for PE history   s/p DCCV, will see EP outpatient   d/c on PO Ceftin 500mg BID for 5 days  Will repeat CT chest 4 weeks  Follow up with me in the office 2 weeks  D/c today   Will continue to monitor

## 2024-04-10 NOTE — DISCHARGE NOTE PROVIDER - NSDCCPCAREPLAN_GEN_ALL_CORE_FT
PRINCIPAL DISCHARGE DIAGNOSIS  Diagnosis: Multifocal pneumonia  Assessment and Plan of Treatment: WHAT IS PNEUMONIA? Pneumonia (PNA) is a lung infection caused by bacteria which makes your lungs inflamed.  THINGS TO DO: (1) Rest as needed (2) Do not smoke – smoking increases your risk for pneumonia (3) Prevent the spread of germs – wash your hands often and cover your mouth when you cough (4) Ask about vaccines with your primary care provider  MONITOR THESE SIGNS AND SYMPTOMS: (1) Worsening confusion (2) Worsening/trouble breathing (3) Fever > 100.4. If you experience any of these, DO alert your primary care provider, or return to the Emergency Department if you feel very sick.      SECONDARY DISCHARGE DIAGNOSES  Diagnosis: Atrial fibrillation  Assessment and Plan of Treatment: WHAT IS ATRIAL FIBRILLATION? Atrial fibrillation (AFIB) is a cardiac arrhythmia caused by a disorder in the hearts electrical system. An arrhythmia is a problem with the speed or rhythm of the heartbeat. Atrial fibrillation is the most common type of arrhythmia.  THINGS TO DO: (1) Monitor your blood pressure and heart rate (2) Limit or avoid alcohol intake – alcohol can increase your risk of AFIB (3) Do not smoke – nicotine can damage your heart and make it difficult to manage your AFIB (4) Eat heart healthy foods like fruits, vegetables, and whole grains (5) Manage a healthy weight (5) Get regular physical activity  MONITOR THESE SIGNS AND SYMPTOMS: (1) Shortness of breath (2) Nausea or vomiting (3) Chest pain or pressure (4) Chest palpitations. If you experience any of these, DO alert your primary care provider, or return to the Emergency Department if you feel very sick.

## 2024-04-10 NOTE — PROGRESS NOTE ADULT - PROVIDER SPECIALTY LIST ADULT
Hospitalist
Hospitalist
Infectious Disease
Pulmonology
Electrophysiology
Hospitalist
Infectious Disease
Pulmonology

## 2024-04-11 LAB
CULTURE RESULTS: SIGNIFICANT CHANGE UP
CULTURE RESULTS: SIGNIFICANT CHANGE UP
SPECIMEN SOURCE: SIGNIFICANT CHANGE UP
SPECIMEN SOURCE: SIGNIFICANT CHANGE UP

## 2024-04-17 DIAGNOSIS — E78.00 PURE HYPERCHOLESTEROLEMIA, UNSPECIFIED: ICD-10-CM

## 2024-04-17 DIAGNOSIS — Z99.89 DEPENDENCE ON OTHER ENABLING MACHINES AND DEVICES: ICD-10-CM

## 2024-04-17 DIAGNOSIS — Z95.5 PRESENCE OF CORONARY ANGIOPLASTY IMPLANT AND GRAFT: ICD-10-CM

## 2024-04-17 DIAGNOSIS — I25.2 OLD MYOCARDIAL INFARCTION: ICD-10-CM

## 2024-04-17 DIAGNOSIS — I48.19 OTHER PERSISTENT ATRIAL FIBRILLATION: ICD-10-CM

## 2024-04-17 DIAGNOSIS — I25.10 ATHEROSCLEROTIC HEART DISEASE OF NATIVE CORONARY ARTERY WITHOUT ANGINA PECTORIS: ICD-10-CM

## 2024-04-17 DIAGNOSIS — M19.90 UNSPECIFIED OSTEOARTHRITIS, UNSPECIFIED SITE: ICD-10-CM

## 2024-04-17 DIAGNOSIS — R59.0 LOCALIZED ENLARGED LYMPH NODES: ICD-10-CM

## 2024-04-17 DIAGNOSIS — Z79.01 LONG TERM (CURRENT) USE OF ANTICOAGULANTS: ICD-10-CM

## 2024-04-17 DIAGNOSIS — I45.10 UNSPECIFIED RIGHT BUNDLE-BRANCH BLOCK: ICD-10-CM

## 2024-04-17 DIAGNOSIS — J90 PLEURAL EFFUSION, NOT ELSEWHERE CLASSIFIED: ICD-10-CM

## 2024-04-17 DIAGNOSIS — E11.65 TYPE 2 DIABETES MELLITUS WITH HYPERGLYCEMIA: ICD-10-CM

## 2024-04-17 DIAGNOSIS — Z86.718 PERSONAL HISTORY OF OTHER VENOUS THROMBOSIS AND EMBOLISM: ICD-10-CM

## 2024-04-17 DIAGNOSIS — J15.69 PNEUMONIA DUE TO OTHER GRAM-NEGATIVE BACTERIA: ICD-10-CM

## 2024-04-17 DIAGNOSIS — G62.89 OTHER SPECIFIED POLYNEUROPATHIES: ICD-10-CM

## 2024-04-17 DIAGNOSIS — I10 ESSENTIAL (PRIMARY) HYPERTENSION: ICD-10-CM

## 2024-04-17 DIAGNOSIS — J45.909 UNSPECIFIED ASTHMA, UNCOMPLICATED: ICD-10-CM

## 2024-04-17 DIAGNOSIS — Z86.711 PERSONAL HISTORY OF PULMONARY EMBOLISM: ICD-10-CM

## 2024-04-17 DIAGNOSIS — G47.33 OBSTRUCTIVE SLEEP APNEA (ADULT) (PEDIATRIC): ICD-10-CM

## 2024-04-17 DIAGNOSIS — I49.3 VENTRICULAR PREMATURE DEPOLARIZATION: ICD-10-CM

## 2024-04-17 DIAGNOSIS — Z79.84 LONG TERM (CURRENT) USE OF ORAL HYPOGLYCEMIC DRUGS: ICD-10-CM

## 2024-05-06 ENCOUNTER — APPOINTMENT (OUTPATIENT)
Dept: ELECTROPHYSIOLOGY | Facility: CLINIC | Age: 69
End: 2024-05-06
Payer: MEDICARE

## 2024-05-06 VITALS
BODY MASS INDEX: 36.94 KG/M2 | DIASTOLIC BLOOD PRESSURE: 80 MMHG | OXYGEN SATURATION: 96 % | SYSTOLIC BLOOD PRESSURE: 135 MMHG | WEIGHT: 258 LBS | HEART RATE: 87 BPM | HEIGHT: 70 IN

## 2024-05-06 DIAGNOSIS — I48.92 UNSPECIFIED ATRIAL FLUTTER: ICD-10-CM

## 2024-05-06 DIAGNOSIS — E78.5 HYPERLIPIDEMIA, UNSPECIFIED: ICD-10-CM

## 2024-05-06 DIAGNOSIS — E11.9 TYPE 2 DIABETES MELLITUS W/OUT COMPLICATIONS: ICD-10-CM

## 2024-05-06 DIAGNOSIS — I48.19 OTHER PERSISTENT ATRIAL FIBRILLATION: ICD-10-CM

## 2024-05-06 PROCEDURE — 93000 ELECTROCARDIOGRAM COMPLETE: CPT

## 2024-05-06 PROCEDURE — 99214 OFFICE O/P EST MOD 30 MIN: CPT

## 2024-05-06 PROCEDURE — 99204 OFFICE O/P NEW MOD 45 MIN: CPT

## 2024-05-06 NOTE — DISCUSSION/SUMMARY
[EKG obtained to assist in diagnosis and management of assessed problem(s)] : EKG obtained to assist in diagnosis and management of assessed problem(s) [FreeTextEntry1] : 69-year-old male with history of hypertension obesity diabetes CAD prior PE who had recent pneumonia and was diagnosed with atrial fibrillation requiring cardioversion.  Patient remains in sinus rhythm compliant with Xarelto 20 mg daily Patient was not feeling any palpitations during A-fib.  I think it is reasonable for patient to undergo long-term cardiac monitoring for A-fib prompt management at the same time patient is encouraged to continue aerobic exercise weight loss treat LEA.  I spent a total of 45 minutes on the encounter evaluating and discussing treatment options with the patient, as well as counseling and coordination of care as stated above.

## 2024-05-06 NOTE — HISTORY OF PRESENT ILLNESS
[FreeTextEntry1] : 69-year-old male with history of HTN dyslipidemia diabetes CAD prior PCI PE, was admitted for PNA on April 2024, dx with atrial fibrillation patient was compliant with Xarelto 20mg qD for PE, s/p DC cardioversion on 4/8/24. Pt known to have RBBB.  TTE from 4/2024 shows LVEF 60% mild LAE. Pt denies cp, sob, palpitations, dizziness.

## 2024-05-09 RX ORDER — METFORMIN HYDROCHLORIDE 1000 MG/1
1000 TABLET, COATED ORAL
Qty: 180 | Refills: 0 | Status: ACTIVE | COMMUNITY
Start: 2024-03-05

## 2024-05-09 RX ORDER — CLOTRIMAZOLE AND BETAMETHASONE DIPROPIONATE 10; .5 MG/G; MG/G
1-0.05 CREAM TOPICAL
Qty: 15 | Refills: 0 | Status: ACTIVE | COMMUNITY
Start: 2024-03-20

## 2024-05-09 RX ORDER — ALLOPURINOL 300 MG/1
300 TABLET ORAL DAILY
Refills: 0 | Status: ACTIVE | COMMUNITY

## 2024-05-09 RX ORDER — METHYLPREDNISOLONE SODIUM SUCCINATE 1 G/ML
1000 INJECTION, POWDER, FOR SOLUTION INTRAMUSCULAR; INTRAVENOUS
Refills: 0 | Status: ACTIVE | COMMUNITY
Start: 2022-08-05

## 2024-05-09 RX ORDER — PRAMIPEXOLE DIHYDROCHLORIDE 0.12 MG/1
0.12 TABLET ORAL
Qty: 360 | Refills: 0 | Status: ACTIVE | COMMUNITY
Start: 2023-11-29

## 2024-05-13 LAB
CHOLEST SERPL-MCNC: 149 MG/DL
HDLC SERPL-MCNC: 38 MG/DL
LDLC SERPL CALC-MCNC: 73 MG/DL
NONHDLC SERPL-MCNC: 111 MG/DL
TRIGL SERPL-MCNC: 233 MG/DL

## 2024-05-30 NOTE — ASU PATIENT PROFILE, ADULT - FALL HARM RISK - UNIVERSAL INTERVENTIONS
Bed in lowest position, wheels locked, appropriate side rails in place/Call bell, personal items and telephone in reach/Non-slip footwear when patient is out of bed/[place] to call system/Physically safe environment - no spills, clutter or unnecessary equipment/Purposeful Proactive Rounding/Room/bathroom lighting operational, light cord in reach

## 2024-05-31 ENCOUNTER — OUTPATIENT (OUTPATIENT)
Dept: OUTPATIENT SERVICES | Facility: HOSPITAL | Age: 69
LOS: 1 days | Discharge: HOME | End: 2024-05-31
Payer: MEDICARE

## 2024-05-31 ENCOUNTER — TRANSCRIPTION ENCOUNTER (OUTPATIENT)
Age: 69
End: 2024-05-31

## 2024-05-31 VITALS — HEIGHT: 70 IN | WEIGHT: 259.93 LBS | RESPIRATION RATE: 16 BRPM

## 2024-05-31 VITALS
HEART RATE: 58 BPM | RESPIRATION RATE: 16 BRPM | OXYGEN SATURATION: 99 % | DIASTOLIC BLOOD PRESSURE: 76 MMHG | SYSTOLIC BLOOD PRESSURE: 132 MMHG

## 2024-05-31 DIAGNOSIS — G56.01 CARPAL TUNNEL SYNDROME, RIGHT UPPER LIMB: Chronic | ICD-10-CM

## 2024-05-31 DIAGNOSIS — Z95.828 PRESENCE OF OTHER VASCULAR IMPLANTS AND GRAFTS: Chronic | ICD-10-CM

## 2024-05-31 DIAGNOSIS — Z98.890 OTHER SPECIFIED POSTPROCEDURAL STATES: Chronic | ICD-10-CM

## 2024-05-31 DIAGNOSIS — Z90.49 ACQUIRED ABSENCE OF OTHER SPECIFIED PARTS OF DIGESTIVE TRACT: Chronic | ICD-10-CM

## 2024-05-31 DIAGNOSIS — I48.19 OTHER PERSISTENT ATRIAL FIBRILLATION: ICD-10-CM

## 2024-05-31 PROCEDURE — C1764: CPT

## 2024-05-31 PROCEDURE — 33285 INSJ SUBQ CAR RHYTHM MNTR: CPT

## 2024-05-31 RX ORDER — EZETIMIBE AND SIMVASTATIN 10; 80 MG/1; MG/1
1 TABLET, FILM COATED ORAL
Qty: 0 | Refills: 0 | DISCHARGE

## 2024-05-31 RX ORDER — TRIAMTERENE/HYDROCHLOROTHIAZID 75 MG-50MG
1 TABLET ORAL
Refills: 0 | DISCHARGE

## 2024-05-31 RX ORDER — RIVAROXABAN 15 MG-20MG
1 KIT ORAL
Qty: 0 | Refills: 0 | DISCHARGE

## 2024-05-31 RX ORDER — ERGOCALCIFEROL 1.25 MG/1
1 CAPSULE ORAL
Qty: 0 | Refills: 0 | DISCHARGE

## 2024-05-31 RX ORDER — ALLOPURINOL 300 MG
1 TABLET ORAL
Refills: 0 | DISCHARGE

## 2024-05-31 RX ORDER — METFORMIN HYDROCHLORIDE 850 MG/1
1 TABLET ORAL
Refills: 0 | DISCHARGE

## 2024-05-31 RX ORDER — POTASSIUM CHLORIDE 20 MEQ
1 PACKET (EA) ORAL
Refills: 0 | DISCHARGE

## 2024-05-31 RX ORDER — PREGABALIN 225 MG/1
1 CAPSULE ORAL
Qty: 0 | Refills: 0 | DISCHARGE

## 2024-05-31 RX ORDER — ICOSAPENT ETHYL 500 MG/1
2 CAPSULE, LIQUID FILLED ORAL
Refills: 0 | DISCHARGE

## 2024-05-31 NOTE — ASU DISCHARGE PLAN (ADULT/PEDIATRIC) - CARE PROVIDER_API CALL
Seda Hernandez  Cardiac Electrophysiology  [address] [place] [address]  Phone: [phone]  Fax: [phone]  Established Patient  Scheduled Appointment: 06/13/2024 02:15 PM

## 2024-05-31 NOTE — PROCEDURAL SAFETY CHECKLIST WITH OR WITHOUT SEDATION - NSPOSTCOMMENTFT_GEN_ALL_CORE
Wound class I. Dressing clean dry and intact, patient tolerated procedure and denies pain or discomfort

## 2024-05-31 NOTE — PACU DISCHARGE NOTE - NSCLINEINSERTRD_GEN_ALL_CORE
09/19/2023  Tylor Vaughn No

## 2024-05-31 NOTE — ASU DISCHARGE PLAN (ADULT/PEDIATRIC) - ASU DC SPECIAL INSTRUCTIONSFT
Your appt is scheduled for Thursday 6/13 @ 2:15pm in Department of Cardiology office located on 1st floor in [facility]. Phone number   Showering is permitted in 24 hrs.  NO SUBMERGING in water, so no tub bathing, swimming, or hot tubs until wound is healed.

## 2024-05-31 NOTE — ASU DISCHARGE PLAN (ADULT/PEDIATRIC) - NS MD DC FALL RISK RISK
For information on Fall & Injury Prevention, visit: [email or web address] OR  [email or web address] OR  https://www.cdc.gov/steadi/patient.html

## 2024-05-31 NOTE — PACU DISCHARGE NOTE - COMMENTS
pt aaox4, vss, dc instructions given, verbalized understanding, wound clean, dry, no s/sx of infection noted, pt ambulated prior to dc, assisted patient upon dc.

## 2024-05-31 NOTE — ASU DISCHARGE PLAN (ADULT/PEDIATRIC) - PROVIDER TOKENS
PROVIDER:[TOKEN:[[id number]],SCHEDULEDAPPT:[06/13/2024],SCHEDULEDAPPTTIME:[02:15 PM],ESTABLISHEDPATIENT:[T]]

## 2024-05-31 NOTE — PRE-OP CHECKLIST - TUBE FEEDING HELD SINCE
Regarding: WI-Dizzy, lightheaded, nausea  ----- Message from Aicha Eldridge sent at 4/22/2020  8:36 PM CDT -----  Patient Name: Robbie Bean    Specialist or PCP Full Name:Dr. Ricardo Dhaliwal    Pregnant (If Yes, how long?):NA    Symptoms: WI-Dizzy, lightheaded, nausea    Do you or any of your household members have the following:  Fever > 100.4#F: No    New or worsening cough: No    Shortness of breath: No    Sore throat: No    New onset of nausea, vomiting or diarrhea: Yes    New onset of loss of taste or smell: No    Have you or a household member tested positive for COVID-19 in the last 14 days?: No    Call Back #:[phone]    Call Center Account #:[id number]    Please update the Demographics section with the patients permanent resident address        31-May-2024 08:00

## 2024-06-04 DIAGNOSIS — I48.91 UNSPECIFIED ATRIAL FIBRILLATION: ICD-10-CM

## 2024-06-06 RX ORDER — METOPROLOL SUCCINATE 25 MG/1
25 TABLET, EXTENDED RELEASE ORAL DAILY
Qty: 90 | Refills: 1 | Status: ACTIVE | COMMUNITY
Start: 2022-08-05

## 2024-06-13 ENCOUNTER — APPOINTMENT (OUTPATIENT)
Dept: ELECTROPHYSIOLOGY | Facility: CLINIC | Age: 69
End: 2024-06-13

## 2024-06-13 VITALS
WEIGHT: 264 LBS | TEMPERATURE: 101.1 F | DIASTOLIC BLOOD PRESSURE: 107 MMHG | HEART RATE: 97 BPM | HEIGHT: 70 IN | SYSTOLIC BLOOD PRESSURE: 131 MMHG | BODY MASS INDEX: 37.8 KG/M2 | OXYGEN SATURATION: 98 %

## 2024-06-13 PROCEDURE — 99212 OFFICE O/P EST SF 10 MIN: CPT

## 2024-06-13 PROCEDURE — 93285 PRGRMG DEV EVAL SCRMS IP: CPT

## 2024-06-13 RX ORDER — METFORMIN HYDROCHLORIDE 500 MG/1
500 TABLET, COATED ORAL TWICE DAILY
Qty: 60 | Refills: 3 | Status: DISCONTINUED | COMMUNITY
End: 2024-06-13

## 2024-07-18 ENCOUNTER — APPOINTMENT (OUTPATIENT)
Dept: ELECTROPHYSIOLOGY | Facility: CLINIC | Age: 69
End: 2024-07-18
Payer: MEDICARE

## 2024-07-19 ENCOUNTER — NON-APPOINTMENT (OUTPATIENT)
Age: 69
End: 2024-07-19

## 2024-07-19 PROCEDURE — 93298 REM INTERROG DEV EVAL SCRMS: CPT

## 2024-07-29 ENCOUNTER — OFFICE (OUTPATIENT)
Dept: URBAN - METROPOLITAN AREA CLINIC 102 | Facility: CLINIC | Age: 69
Setting detail: OPHTHALMOLOGY
End: 2024-07-29
Payer: MEDICARE

## 2024-07-29 DIAGNOSIS — E11.9: ICD-10-CM

## 2024-07-29 DIAGNOSIS — H01.005: ICD-10-CM

## 2024-07-29 DIAGNOSIS — H43.393: ICD-10-CM

## 2024-07-29 DIAGNOSIS — H01.002: ICD-10-CM

## 2024-07-29 DIAGNOSIS — H01.004: ICD-10-CM

## 2024-07-29 DIAGNOSIS — H01.001: ICD-10-CM

## 2024-07-29 DIAGNOSIS — H40.013: ICD-10-CM

## 2024-07-29 DIAGNOSIS — Z96.1: ICD-10-CM

## 2024-07-29 PROCEDURE — 92133 CPTRZD OPH DX IMG PST SGM ON: CPT | Performed by: OPHTHALMOLOGY

## 2024-07-29 PROCEDURE — 92014 COMPRE OPH EXAM EST PT 1/>: CPT | Performed by: OPHTHALMOLOGY

## 2024-07-29 PROCEDURE — 92083 EXTENDED VISUAL FIELD XM: CPT | Performed by: OPHTHALMOLOGY

## 2024-07-29 ASSESSMENT — CONFRONTATIONAL VISUAL FIELD TEST (CVF)
OD_FINDINGS: FULL
OS_FINDINGS: FULL

## 2024-07-29 ASSESSMENT — LID EXAM ASSESSMENTS
OD_BLEPHARITIS: RLL RUL T
OS_BLEPHARITIS: LLL LUL T

## 2024-08-28 PROBLEM — Z45.09 ENCOUNTER FOR LOOP RECORDER CHECK: Status: ACTIVE | Noted: 2024-08-28

## 2024-09-09 ENCOUNTER — APPOINTMENT (OUTPATIENT)
Dept: CARDIOLOGY | Facility: CLINIC | Age: 69
End: 2024-09-09

## 2024-09-18 ENCOUNTER — NON-APPOINTMENT (OUTPATIENT)
Age: 69
End: 2024-09-18

## 2024-09-18 ENCOUNTER — APPOINTMENT (OUTPATIENT)
Dept: ELECTROPHYSIOLOGY | Facility: CLINIC | Age: 69
End: 2024-09-18
Payer: MEDICARE

## 2024-09-18 PROCEDURE — 93298 REM INTERROG DEV EVAL SCRMS: CPT

## 2024-10-23 ENCOUNTER — APPOINTMENT (OUTPATIENT)
Dept: ELECTROPHYSIOLOGY | Facility: CLINIC | Age: 69
End: 2024-10-23

## 2024-12-12 ENCOUNTER — NON-APPOINTMENT (OUTPATIENT)
Age: 69
End: 2024-12-12

## 2024-12-12 ENCOUNTER — APPOINTMENT (OUTPATIENT)
Dept: ELECTROPHYSIOLOGY | Facility: CLINIC | Age: 69
End: 2024-12-12
Payer: MEDICARE

## 2024-12-12 VITALS
DIASTOLIC BLOOD PRESSURE: 70 MMHG | OXYGEN SATURATION: 98 % | SYSTOLIC BLOOD PRESSURE: 137 MMHG | HEIGHT: 70 IN | BODY MASS INDEX: 39.37 KG/M2 | HEART RATE: 71 BPM | WEIGHT: 275 LBS

## 2024-12-12 PROCEDURE — 93291 INTERROG DEV EVAL SCRMS IP: CPT

## 2024-12-16 ENCOUNTER — NON-APPOINTMENT (OUTPATIENT)
Age: 69
End: 2024-12-16

## 2024-12-16 DIAGNOSIS — I47.29 OTHER VENTRICULAR TACHYCARDIA: ICD-10-CM

## 2024-12-19 ENCOUNTER — APPOINTMENT (OUTPATIENT)
Dept: CARDIOLOGY | Facility: CLINIC | Age: 69
End: 2024-12-19

## 2024-12-19 PROCEDURE — A9500: CPT

## 2024-12-19 PROCEDURE — 93015 CV STRESS TEST SUPVJ I&R: CPT

## 2024-12-19 PROCEDURE — 78452 HT MUSCLE IMAGE SPECT MULT: CPT

## 2024-12-20 ENCOUNTER — INPATIENT (INPATIENT)
Facility: HOSPITAL | Age: 69
LOS: 2 days | Discharge: HOME | DRG: 287 | End: 2024-12-23
Attending: STUDENT IN AN ORGANIZED HEALTH CARE EDUCATION/TRAINING PROGRAM | Admitting: HOSPITALIST
Payer: MEDICARE

## 2024-12-20 ENCOUNTER — NON-APPOINTMENT (OUTPATIENT)
Age: 69
End: 2024-12-20

## 2024-12-20 VITALS — OXYGEN SATURATION: 97 % | HEIGHT: 70 IN | HEART RATE: 60 BPM | WEIGHT: 286.82 LBS

## 2024-12-20 DIAGNOSIS — I49.9 CARDIAC ARRHYTHMIA, UNSPECIFIED: ICD-10-CM

## 2024-12-20 DIAGNOSIS — Z90.49 ACQUIRED ABSENCE OF OTHER SPECIFIED PARTS OF DIGESTIVE TRACT: Chronic | ICD-10-CM

## 2024-12-20 DIAGNOSIS — Z98.890 OTHER SPECIFIED POSTPROCEDURAL STATES: Chronic | ICD-10-CM

## 2024-12-20 DIAGNOSIS — I47.29 OTHER VENTRICULAR TACHYCARDIA: ICD-10-CM

## 2024-12-20 DIAGNOSIS — Z95.828 PRESENCE OF OTHER VASCULAR IMPLANTS AND GRAFTS: Chronic | ICD-10-CM

## 2024-12-20 DIAGNOSIS — G56.01 CARPAL TUNNEL SYNDROME, RIGHT UPPER LIMB: Chronic | ICD-10-CM

## 2024-12-20 LAB
ADD ON TEST-SPECIMEN IN LAB: SIGNIFICANT CHANGE UP
ALBUMIN SERPL ELPH-MCNC: 3.8 G/DL — SIGNIFICANT CHANGE UP (ref 3.3–5)
ALP SERPL-CCNC: 43 U/L — SIGNIFICANT CHANGE UP (ref 40–120)
ALT FLD-CCNC: 41 U/L — SIGNIFICANT CHANGE UP (ref 12–78)
ANION GAP SERPL CALC-SCNC: 4 MMOL/L — LOW (ref 5–17)
APTT BLD: 30.8 SEC — SIGNIFICANT CHANGE UP (ref 24.5–35.6)
AST SERPL-CCNC: 30 U/L — SIGNIFICANT CHANGE UP (ref 15–37)
BASOPHILS # BLD AUTO: 0.06 K/UL — SIGNIFICANT CHANGE UP (ref 0–0.2)
BASOPHILS NFR BLD AUTO: 0.7 % — SIGNIFICANT CHANGE UP (ref 0–2)
BILIRUB SERPL-MCNC: 1.9 MG/DL — HIGH (ref 0.2–1.2)
BUN SERPL-MCNC: 9 MG/DL — SIGNIFICANT CHANGE UP (ref 7–23)
CALCIUM SERPL-MCNC: 8.7 MG/DL — SIGNIFICANT CHANGE UP (ref 8.5–10.1)
CHLORIDE SERPL-SCNC: 108 MMOL/L — SIGNIFICANT CHANGE UP (ref 96–108)
CO2 SERPL-SCNC: 24 MMOL/L — SIGNIFICANT CHANGE UP (ref 22–31)
CREAT SERPL-MCNC: 0.86 MG/DL — SIGNIFICANT CHANGE UP (ref 0.5–1.3)
EGFR: 94 ML/MIN/1.73M2 — SIGNIFICANT CHANGE UP
EOSINOPHIL # BLD AUTO: 0.11 K/UL — SIGNIFICANT CHANGE UP (ref 0–0.5)
EOSINOPHIL NFR BLD AUTO: 1.3 % — SIGNIFICANT CHANGE UP (ref 0–6)
GLUCOSE BLDC GLUCOMTR-MCNC: 132 MG/DL — HIGH (ref 70–99)
GLUCOSE BLDC GLUCOMTR-MCNC: 159 MG/DL — HIGH (ref 70–99)
GLUCOSE SERPL-MCNC: 148 MG/DL — HIGH (ref 70–99)
HCT VFR BLD CALC: 43.7 % — SIGNIFICANT CHANGE UP (ref 39–50)
HGB BLD-MCNC: 15.2 G/DL — SIGNIFICANT CHANGE UP (ref 13–17)
IMM GRANULOCYTES NFR BLD AUTO: 0.2 % — SIGNIFICANT CHANGE UP (ref 0–0.9)
INR BLD: 1.06 RATIO — SIGNIFICANT CHANGE UP (ref 0.85–1.16)
LYMPHOCYTES # BLD AUTO: 2.11 K/UL — SIGNIFICANT CHANGE UP (ref 1–3.3)
LYMPHOCYTES # BLD AUTO: 25.8 % — SIGNIFICANT CHANGE UP (ref 13–44)
MAGNESIUM SERPL-MCNC: 1.8 MG/DL — SIGNIFICANT CHANGE UP (ref 1.6–2.6)
MCHC RBC-ENTMCNC: 31.7 PG — SIGNIFICANT CHANGE UP (ref 27–34)
MCHC RBC-ENTMCNC: 34.8 G/DL — SIGNIFICANT CHANGE UP (ref 32–36)
MCV RBC AUTO: 91.2 FL — SIGNIFICANT CHANGE UP (ref 80–100)
MONOCYTES # BLD AUTO: 0.58 K/UL — SIGNIFICANT CHANGE UP (ref 0–0.9)
MONOCYTES NFR BLD AUTO: 7.1 % — SIGNIFICANT CHANGE UP (ref 2–14)
NEUTROPHILS # BLD AUTO: 5.31 K/UL — SIGNIFICANT CHANGE UP (ref 1.8–7.4)
NEUTROPHILS NFR BLD AUTO: 64.9 % — SIGNIFICANT CHANGE UP (ref 43–77)
PLATELET # BLD AUTO: 196 K/UL — SIGNIFICANT CHANGE UP (ref 150–400)
POTASSIUM SERPL-MCNC: 4.1 MMOL/L — SIGNIFICANT CHANGE UP (ref 3.5–5.3)
POTASSIUM SERPL-SCNC: 4.1 MMOL/L — SIGNIFICANT CHANGE UP (ref 3.5–5.3)
PROT SERPL-MCNC: 6.9 GM/DL — SIGNIFICANT CHANGE UP (ref 6–8.3)
PROTHROM AB SERPL-ACNC: 12.5 SEC — SIGNIFICANT CHANGE UP (ref 9.9–13.4)
RBC # BLD: 4.79 M/UL — SIGNIFICANT CHANGE UP (ref 4.2–5.8)
RBC # FLD: 13.2 % — SIGNIFICANT CHANGE UP (ref 10.3–14.5)
SODIUM SERPL-SCNC: 136 MMOL/L — SIGNIFICANT CHANGE UP (ref 135–145)
TROPONIN I, HIGH SENSITIVITY RESULT: 13.65 NG/L — SIGNIFICANT CHANGE UP
WBC # BLD: 8.19 K/UL — SIGNIFICANT CHANGE UP (ref 3.8–10.5)
WBC # FLD AUTO: 8.19 K/UL — SIGNIFICANT CHANGE UP (ref 3.8–10.5)

## 2024-12-20 PROCEDURE — 84443 ASSAY THYROID STIM HORMONE: CPT

## 2024-12-20 PROCEDURE — 80048 BASIC METABOLIC PNL TOTAL CA: CPT

## 2024-12-20 PROCEDURE — 93010 ELECTROCARDIOGRAM REPORT: CPT

## 2024-12-20 PROCEDURE — 36415 COLL VENOUS BLD VENIPUNCTURE: CPT

## 2024-12-20 PROCEDURE — 85027 COMPLETE CBC AUTOMATED: CPT

## 2024-12-20 PROCEDURE — 83735 ASSAY OF MAGNESIUM: CPT

## 2024-12-20 PROCEDURE — 93306 TTE W/DOPPLER COMPLETE: CPT

## 2024-12-20 PROCEDURE — C1894: CPT

## 2024-12-20 PROCEDURE — 99222 1ST HOSP IP/OBS MODERATE 55: CPT

## 2024-12-20 PROCEDURE — 82962 GLUCOSE BLOOD TEST: CPT

## 2024-12-20 PROCEDURE — 93458 L HRT ARTERY/VENTRICLE ANGIO: CPT

## 2024-12-20 PROCEDURE — C1769: CPT

## 2024-12-20 PROCEDURE — 84484 ASSAY OF TROPONIN QUANT: CPT

## 2024-12-20 PROCEDURE — 80053 COMPREHEN METABOLIC PANEL: CPT

## 2024-12-20 PROCEDURE — 99223 1ST HOSP IP/OBS HIGH 75: CPT

## 2024-12-20 PROCEDURE — 84100 ASSAY OF PHOSPHORUS: CPT

## 2024-12-20 PROCEDURE — 71045 X-RAY EXAM CHEST 1 VIEW: CPT | Mod: 26

## 2024-12-20 PROCEDURE — 99285 EMERGENCY DEPT VISIT HI MDM: CPT | Mod: FS

## 2024-12-20 PROCEDURE — C1887: CPT

## 2024-12-20 PROCEDURE — 83036 HEMOGLOBIN GLYCOSYLATED A1C: CPT

## 2024-12-20 RX ORDER — DEXTROSE MONOHYDRATE 25 G/50ML
25 INJECTION, SOLUTION INTRAVENOUS ONCE
Refills: 0 | Status: DISCONTINUED | OUTPATIENT
Start: 2024-12-20 | End: 2024-12-23

## 2024-12-20 RX ORDER — METOPROLOL TARTRATE 50 MG
50 TABLET ORAL DAILY
Refills: 0 | Status: DISCONTINUED | OUTPATIENT
Start: 2024-12-20 | End: 2024-12-23

## 2024-12-20 RX ORDER — PRAMIPEXOLE DIHYDROCHLORIDE 0.75 MG/1
0.12 TABLET, EXTENDED RELEASE ORAL DAILY
Refills: 0 | Status: DISCONTINUED | OUTPATIENT
Start: 2024-12-20 | End: 2024-12-23

## 2024-12-20 RX ORDER — METOPROLOL TARTRATE 50 MG
1 TABLET ORAL
Refills: 0 | DISCHARGE

## 2024-12-20 RX ORDER — DEXTROSE MONOHYDRATE 25 G/50ML
12.5 INJECTION, SOLUTION INTRAVENOUS ONCE
Refills: 0 | Status: DISCONTINUED | OUTPATIENT
Start: 2024-12-20 | End: 2024-12-23

## 2024-12-20 RX ORDER — DEXTROSE MONOHYDRATE 25 G/50ML
15 INJECTION, SOLUTION INTRAVENOUS ONCE
Refills: 0 | Status: DISCONTINUED | OUTPATIENT
Start: 2024-12-20 | End: 2024-12-23

## 2024-12-20 RX ORDER — GLUCAGON INJECTION, SOLUTION 0.5 MG/.1ML
1 INJECTION, SOLUTION SUBCUTANEOUS ONCE
Refills: 0 | Status: DISCONTINUED | OUTPATIENT
Start: 2024-12-20 | End: 2024-12-23

## 2024-12-20 RX ORDER — INSULIN LISPRO 100/ML
VIAL (ML) SUBCUTANEOUS AT BEDTIME
Refills: 0 | Status: DISCONTINUED | OUTPATIENT
Start: 2024-12-20 | End: 2024-12-23

## 2024-12-20 RX ORDER — INSULIN LISPRO 100/ML
VIAL (ML) SUBCUTANEOUS
Refills: 0 | Status: DISCONTINUED | OUTPATIENT
Start: 2024-12-20 | End: 2024-12-23

## 2024-12-20 RX ORDER — LATANOPROST 50 UG/ML
1 SOLUTION OPHTHALMIC
Refills: 0 | DISCHARGE

## 2024-12-20 RX ORDER — COLCHICINE 0.6 MG/1
0.6 CAPSULE ORAL
Refills: 0 | Status: DISCONTINUED | OUTPATIENT
Start: 2024-12-20 | End: 2024-12-23

## 2024-12-20 RX ORDER — CETIRIZINE HYDROCHLORIDE 5 MG/5ML
10 SYRUP ORAL DAILY
Refills: 0 | Status: DISCONTINUED | OUTPATIENT
Start: 2024-12-20 | End: 2024-12-23

## 2024-12-20 RX ORDER — MAGNESIUM SULFATE 500 MG/ML
2 INJECTION, SOLUTION INTRAMUSCULAR; INTRAVENOUS ONCE
Refills: 0 | Status: COMPLETED | OUTPATIENT
Start: 2024-12-20 | End: 2024-12-20

## 2024-12-20 RX ORDER — SODIUM CHLORIDE 9 MG/ML
1000 INJECTION, SOLUTION INTRAVENOUS
Refills: 0 | Status: DISCONTINUED | OUTPATIENT
Start: 2024-12-20 | End: 2024-12-23

## 2024-12-20 RX ORDER — ALLOPURINOL 100 MG/1
300 TABLET ORAL DAILY
Refills: 0 | Status: DISCONTINUED | OUTPATIENT
Start: 2024-12-20 | End: 2024-12-23

## 2024-12-20 RX ORDER — AMIODARONE HYDROCHLORIDE 200 MG/1
400 TABLET ORAL ONCE
Refills: 0 | Status: COMPLETED | OUTPATIENT
Start: 2024-12-20 | End: 2024-12-20

## 2024-12-20 RX ORDER — TRIAMTERENE/HYDROCHLOROTHIAZID 37.5-25 MG
1 TABLET ORAL DAILY
Refills: 0 | Status: DISCONTINUED | OUTPATIENT
Start: 2024-12-20 | End: 2024-12-23

## 2024-12-20 RX ORDER — AMIODARONE HYDROCHLORIDE 200 MG/1
400 TABLET ORAL
Refills: 0 | Status: DISCONTINUED | OUTPATIENT
Start: 2024-12-20 | End: 2024-12-23

## 2024-12-20 RX ORDER — ENOXAPARIN SODIUM 60 MG/.6ML
130 INJECTION INTRAVENOUS; SUBCUTANEOUS EVERY 12 HOURS
Refills: 0 | Status: DISCONTINUED | OUTPATIENT
Start: 2024-12-20 | End: 2024-12-23

## 2024-12-20 RX ORDER — ACETAMINOPHEN 80 MG/.8ML
650 SOLUTION/ DROPS ORAL EVERY 6 HOURS
Refills: 0 | Status: DISCONTINUED | OUTPATIENT
Start: 2024-12-20 | End: 2024-12-23

## 2024-12-20 RX ORDER — ATORVASTATIN CALCIUM 40 MG/1
20 TABLET, FILM COATED ORAL AT BEDTIME
Refills: 0 | Status: DISCONTINUED | OUTPATIENT
Start: 2024-12-20 | End: 2024-12-23

## 2024-12-20 RX ORDER — ENALAPRIL MALEATE 10 MG/1
20 TABLET ORAL AT BEDTIME
Refills: 0 | Status: DISCONTINUED | OUTPATIENT
Start: 2024-12-20 | End: 2024-12-23

## 2024-12-20 RX ORDER — LATANOPROST 50 UG/ML
1 SOLUTION OPHTHALMIC AT BEDTIME
Refills: 0 | Status: DISCONTINUED | OUTPATIENT
Start: 2024-12-20 | End: 2024-12-23

## 2024-12-20 RX ORDER — EZETIMIBE 10 MG/1
10 TABLET ORAL AT BEDTIME
Refills: 0 | Status: DISCONTINUED | OUTPATIENT
Start: 2024-12-20 | End: 2024-12-23

## 2024-12-20 RX ORDER — GINKGO BILOBA 40 MG
3 CAPSULE ORAL AT BEDTIME
Refills: 0 | Status: DISCONTINUED | OUTPATIENT
Start: 2024-12-20 | End: 2024-12-23

## 2024-12-20 RX ORDER — ALBUTEROL SULFATE 90 UG/1
2 INHALANT RESPIRATORY (INHALATION) EVERY 6 HOURS
Refills: 0 | Status: DISCONTINUED | OUTPATIENT
Start: 2024-12-20 | End: 2024-12-23

## 2024-12-20 RX ORDER — ALLOPURINOL 100 MG/1
1 TABLET ORAL
Refills: 0 | DISCHARGE

## 2024-12-20 RX ORDER — MAG HYDROX/ALUMINUM HYD/SIMETH 200-200-20
30 SUSPENSION, ORAL (FINAL DOSE FORM) ORAL EVERY 4 HOURS
Refills: 0 | Status: DISCONTINUED | OUTPATIENT
Start: 2024-12-20 | End: 2024-12-23

## 2024-12-20 RX ORDER — CETIRIZINE HYDROCHLORIDE 5 MG/5ML
1 SYRUP ORAL
Refills: 0 | DISCHARGE

## 2024-12-20 RX ORDER — ONDANSETRON 4 MG/1
4 TABLET ORAL EVERY 8 HOURS
Refills: 0 | Status: DISCONTINUED | OUTPATIENT
Start: 2024-12-20 | End: 2024-12-23

## 2024-12-20 RX ADMIN — ATORVASTATIN CALCIUM 20 MILLIGRAM(S): 40 TABLET, FILM COATED ORAL at 22:55

## 2024-12-20 RX ADMIN — EZETIMIBE 10 MILLIGRAM(S): 10 TABLET ORAL at 23:39

## 2024-12-20 RX ADMIN — ENOXAPARIN SODIUM 130 MILLIGRAM(S): 60 INJECTION INTRAVENOUS; SUBCUTANEOUS at 23:39

## 2024-12-20 RX ADMIN — Medication 400 MILLIGRAM(S): at 22:54

## 2024-12-20 RX ADMIN — ENALAPRIL MALEATE 20 MILLIGRAM(S): 10 TABLET ORAL at 22:54

## 2024-12-20 RX ADMIN — AMIODARONE HYDROCHLORIDE 400 MILLIGRAM(S): 200 TABLET ORAL at 14:42

## 2024-12-20 RX ADMIN — AMIODARONE HYDROCHLORIDE 400 MILLIGRAM(S): 200 TABLET ORAL at 22:55

## 2024-12-20 RX ADMIN — MAGNESIUM SULFATE 25 GRAM(S): 500 INJECTION, SOLUTION INTRAMUSCULAR; INTRAVENOUS at 14:41

## 2024-12-20 NOTE — ED STATDOCS - PROGRESS NOTE DETAILS
70 y/o male w/ PMHx CAD, demyelinating neuropathy, Afib on Xarelto, DM, DVT, HLD, HTN, MI, LEA, OA, and PE presents sent by Dr. Lindo and Mary for abnormalities found on loop recorder. Pt reports he was feeling lightheaded earlier today but is feeling okay right now. Denies chest pain, shortness of breath, or difficulty breathing. No other complaints at this time.  Azalea Duenas PA-C EP paged and they are coming in to evaluate patient.  Azalea Duenas PA-C EP NP in department to evaluate.  PO Amiodarone ordered and admit to tele bed.  Cardiology will consult and admit to medicine.  Pt. had runs of V tach on loop recorder.  Azalea Duenas PA-C

## 2024-12-20 NOTE — H&P ADULT - HISTORY OF PRESENT ILLNESS
Patient is a 69y old  Male who presents with a chief complaint of VT (20 Dec 2024 15:47)    HPI: 69M hx HTN, HLD, DM, CAD s/p MI and remote PCI, pAfib and PE on Xarelto, LEA on CPAP qhs, demyelinating neuropathy on iv steroids q5wks (last 2 wks ago). By EP hx, pt had developed Afib while admitted with PNA in 4/24, underwent DCCV 4/8/24 and under ILR in 5/24 to eval for pAFib burden    PAST MEDICAL & SURGICAL HISTORY:  HLD (hyperlipidemia)      CAD (coronary artery disease)      MI, old  x2- stentx1      HTN (hypertension)      LEA (obstructive sleep apnea)  CPAP      Demyelinating neuropathy      DVT (deep venous thrombosis)  unprovoked DVT/PE- as per patient his mother and sister also had DVT- genetic testing was done but negative      Personal history of PE (pulmonary embolism)      DM (diabetes mellitus)      Osteoarthritis      History of cholecystectomy      H/O Achilles tendon repair      History of percutaneous coronary intervention  RCA stent 2008      S/P IVC filter  7/8/2021      Carpal tunnel syndrome of right wrist  2019        FAMILY HISTORY:  FH: pancreatic cancer (Father)  family history of DVT - mother and sister- both on coumadin      Social History:      Allergies    No Known Allergies    Intolerances        MEDICATIONS  (STANDING):  allopurinol 300 milliGRAM(s) Oral daily  aMIOdarone    Tablet 400 milliGRAM(s) Oral two times a day  atorvastatin 20 milliGRAM(s) Oral at bedtime  cetirizine 10 milliGRAM(s) Oral daily  colchicine 0.6 milliGRAM(s) Oral <User Schedule>  dextrose 5%. 1000 milliLiter(s) (50 mL/Hr) IV Continuous <Continuous>  dextrose 5%. 1000 milliLiter(s) (100 mL/Hr) IV Continuous <Continuous>  dextrose 50% Injectable 25 Gram(s) IV Push once  dextrose 50% Injectable 12.5 Gram(s) IV Push once  dextrose 50% Injectable 25 Gram(s) IV Push once  enalapril 20 milliGRAM(s) Oral at bedtime  enoxaparin Injectable 130 milliGRAM(s) SubCutaneous every 12 hours  ezetimibe 10 milliGRAM(s) Oral at bedtime  glucagon  Injectable 1 milliGRAM(s) IntraMuscular once  insulin lispro (ADMELOG) corrective regimen sliding scale   SubCutaneous three times a day before meals  insulin lispro (ADMELOG) corrective regimen sliding scale   SubCutaneous at bedtime  latanoprost 0.005% Ophthalmic Solution 1 Drop(s) Both EYES at bedtime  magnesium oxide 400 milliGRAM(s) Oral at bedtime  metoprolol succinate ER 50 milliGRAM(s) Oral daily  triamterene 37.5 mG/hydrochlorothiazide 25 mG Tablet 1 Tablet(s) Oral daily    MEDICATIONS  (PRN):  acetaminophen     Tablet .. 650 milliGRAM(s) Oral every 6 hours PRN Temp greater or equal to 38C (100.4F), Mild Pain (1 - 3)  albuterol    90 MICROgram(s) HFA Inhaler 2 Puff(s) Inhalation every 6 hours PRN Wheezing  aluminum hydroxide/magnesium hydroxide/simethicone Suspension 30 milliLiter(s) Oral every 4 hours PRN Dyspepsia  dextrose Oral Gel 15 Gram(s) Oral once PRN Blood Glucose LESS THAN 70 milliGRAM(s)/deciliter  melatonin 3 milliGRAM(s) Oral at bedtime PRN Insomnia  ondansetron Injectable 4 milliGRAM(s) IV Push every 8 hours PRN Nausea and/or Vomiting  pramipexole 0.125 milliGRAM(s) Oral daily PRN restless leg      ROS:  General:  No fevers, chills, or unexplained weight loss  Skin: No rash or bothersome skin lesions  Musculoskeletal: No arthalgias, myalgias or joint swelling  Eyes: No visual changes or eye pain  Ears: No hearing loss , otorrhea or ear pain  Nose, Mouth, Throat: No nasal congestion, rhinorrhea, oral lesions, postnasal drip or sore throat  Cardio: No chest pain or palpitations. no lower extremity edema. no syncope. no claudication.   Respiratory: No cough, shortness of breath or wheezing   GI: No diarrhea, constipation, blood in stools, abdominal pain, vomiting or heartburn  : No urinary frequency, hematuria, incontinence, or dysuria  Neurologic: No headaches, parasthesias, confusion, dysarthria or gait instability  Psychiatric:  No anxiety or depression  Lymphatic:  No easy bruising, easy bleeding or swollen glands  Allergic: No itching, sneezing , watery eyes, clear rhinorrhea or recurrent infections    PEx  T(C): 37.2 (12-20-24 @ 16:13), Max: 37.2 (12-20-24 @ 16:13)  HR: 66 (12-20-24 @ 16:13) (60 - 66)  BP: 113/68 (12-20-24 @ 16:13) (113/68 - 136/87)  RR: 18 (12-20-24 @ 16:13) (18 - 18)  SpO2: 97% (12-20-24 @ 16:13) (97% - 99%)  Wt(kg): --  General:     no acute distress, no identifying marks , scars, or tattoos.  Skin: no rash or prominent lesions  Head: normocephalic, atraumatic     Sinuses: non-tender  Nose: no external lesions, mucosa non-inflamed, septum and turbinates normal  Throat: no erythema, exudates or lesions.  Neck: Supple without lymphadenopathy. Thyroid no thyromegaly, no palpable thyroid nodules, no palpable nodules or masses, carotid arteries no bruits.   Breasts: No palpable masses or lesions.  Heart: RRR, no murmur or gallop.  Normal S1, S2.  No S3, S4.   Lungs: CTA bilaterally, no wheezes, rhonchi, rales.  Breathing unlabored.   Chest wall: Normal insp   Abdomen:  Soft, NT/ND, normal bowel sounds, no HSM, no masses.  No peritoneal signs.   Back: spine normal without deformity or tenderness.  Normal ROM   : Exam normal.  no inguinal hernias.  Extremities: No deformities, clubbing, cyanosis, or edema.  Musculoskeletal: Normal gait and station. No decreased range of motion, instability, atrophy or abnormal strength or tone in the head, neck, spine, ribs, pelvis or extremities.   Neurologic: CN 2-12 normal. Sensation to pain, touch and proprioception normal. DTRs normal in upper and lower extremities. No pathologic reflexes.  Motor normal.  Psychiatric: Oriented X3, intact recent and remote memory, judgement and insight, normal mood and affect.                          15.2   8.19  )-----------( 196      ( 20 Dec 2024 13:50 )             43.7     12-20    136  |  108  |  9   ----------------------------<  148[H]  4.1   |  24  |  0.86    Ca    8.7      20 Dec 2024 13:50  Mg     1.8     12-20    TPro  6.9  /  Alb  3.8  /  TBili  1.9[H]  /  DBili  x   /  AST  30  /  ALT  41  /  AlkPhos  43  12-20    CAPILLARY BLOOD GLUCOSE        PT/INR - ( 20 Dec 2024 13:50 )   PT: 12.5 sec;   INR: 1.06 ratio         PTT - ( 20 Dec 2024 13:50 )  PTT:30.8 sec  Urinalysis Basic - ( 20 Dec 2024 13:50 )    Color: x / Appearance: x / SG: x / pH: x  Gluc: 148 mg/dL / Ketone: x  / Bili: x / Urobili: x   Blood: x / Protein: x / Nitrite: x   Leuk Esterase: x / RBC: x / WBC x   Sq Epi: x / Non Sq Epi: x / Bacteria: x          Radiology/Imaging, I have personally reviewed:   Patient is a 69y old  Male who presents with a chief complaint of VT (20 Dec 2024 15:47)    HPI: 69M hx HTN, HLD, DM, CAD s/p MI and remote PCI, pAfib and PE on Xarelto, LEA on CPAP qhs, demyelinating neuropathy on iv steroids q5wks (last 2 wks ago). By EP hx, pt had developed Afib while admitted with PNA in 4/24, underwent DCCV 4/8/24 and under ILR in 5/24 to eval for pAFib burden, On 12/19/24 had on and off NSVT/AIVR on ILR, called in by EP to go to ER for further eval. Brief LH noted this am but otherwise no sx, no syncope. Per Dr Lindo, had a normal nuclear stress 1 y ago.     PAST MEDICAL & SURGICAL HISTORY:  HLD (hyperlipidemia)      CAD (coronary artery disease)      MI, old  x2- stentx1      HTN (hypertension)      LEA (obstructive sleep apnea)  CPAP      Demyelinating neuropathy      DVT (deep venous thrombosis)  unprovoked DVT/PE- as per patient his mother and sister also had DVT- genetic testing was done but negative      Personal history of PE (pulmonary embolism)      DM (diabetes mellitus)      Osteoarthritis      History of cholecystectomy      H/O Achilles tendon repair      History of percutaneous coronary intervention  RCA stent 2008      S/P IVC filter  7/8/2021      Carpal tunnel syndrome of right wrist  2019        FAMILY HISTORY:  FH: pancreatic cancer (Father)  family history of DVT - mother and sister- both on coumadin      Social History:      Allergies    No Known Allergies    Intolerances        MEDICATIONS  (STANDING):  allopurinol 300 milliGRAM(s) Oral daily  aMIOdarone    Tablet 400 milliGRAM(s) Oral two times a day  atorvastatin 20 milliGRAM(s) Oral at bedtime  cetirizine 10 milliGRAM(s) Oral daily  colchicine 0.6 milliGRAM(s) Oral <User Schedule>  dextrose 5%. 1000 milliLiter(s) (50 mL/Hr) IV Continuous <Continuous>  dextrose 5%. 1000 milliLiter(s) (100 mL/Hr) IV Continuous <Continuous>  dextrose 50% Injectable 25 Gram(s) IV Push once  dextrose 50% Injectable 12.5 Gram(s) IV Push once  dextrose 50% Injectable 25 Gram(s) IV Push once  enalapril 20 milliGRAM(s) Oral at bedtime  enoxaparin Injectable 130 milliGRAM(s) SubCutaneous every 12 hours  ezetimibe 10 milliGRAM(s) Oral at bedtime  glucagon  Injectable 1 milliGRAM(s) IntraMuscular once  insulin lispro (ADMELOG) corrective regimen sliding scale   SubCutaneous three times a day before meals  insulin lispro (ADMELOG) corrective regimen sliding scale   SubCutaneous at bedtime  latanoprost 0.005% Ophthalmic Solution 1 Drop(s) Both EYES at bedtime  magnesium oxide 400 milliGRAM(s) Oral at bedtime  metoprolol succinate ER 50 milliGRAM(s) Oral daily  triamterene 37.5 mG/hydrochlorothiazide 25 mG Tablet 1 Tablet(s) Oral daily    MEDICATIONS  (PRN):  acetaminophen     Tablet .. 650 milliGRAM(s) Oral every 6 hours PRN Temp greater or equal to 38C (100.4F), Mild Pain (1 - 3)  albuterol    90 MICROgram(s) HFA Inhaler 2 Puff(s) Inhalation every 6 hours PRN Wheezing  aluminum hydroxide/magnesium hydroxide/simethicone Suspension 30 milliLiter(s) Oral every 4 hours PRN Dyspepsia  dextrose Oral Gel 15 Gram(s) Oral once PRN Blood Glucose LESS THAN 70 milliGRAM(s)/deciliter  melatonin 3 milliGRAM(s) Oral at bedtime PRN Insomnia  ondansetron Injectable 4 milliGRAM(s) IV Push every 8 hours PRN Nausea and/or Vomiting  pramipexole 0.125 milliGRAM(s) Oral daily PRN restless leg      ROS:  General:  No fevers, chills, or unexplained weight loss  Skin: No rash or bothersome skin lesions  Musculoskeletal: No arthalgias, myalgias or joint swelling  Eyes: No visual changes or eye pain  Ears: No hearing loss , otorrhea or ear pain  Nose, Mouth, Throat: No nasal congestion, rhinorrhea, oral lesions, postnasal drip or sore throat  Cardio: No chest pain or palpitations. no lower extremity edema. no syncope. no claudication.   Respiratory: No cough, shortness of breath or wheezing   GI: No diarrhea, constipation, blood in stools, abdominal pain, vomiting or heartburn  : No urinary frequency, hematuria, incontinence, or dysuria  Neurologic: No headaches, parasthesias, confusion, dysarthria or gait instability  Psychiatric:  No anxiety or depression  Lymphatic:  No easy bruising, easy bleeding or swollen glands  Allergic: No itching, sneezing , watery eyes, clear rhinorrhea or recurrent infections    PEx  T(C): 37.2 (12-20-24 @ 16:13), Max: 37.2 (12-20-24 @ 16:13)  HR: 66 (12-20-24 @ 16:13) (60 - 66)  BP: 113/68 (12-20-24 @ 16:13) (113/68 - 136/87)  RR: 18 (12-20-24 @ 16:13) (18 - 18)  SpO2: 97% (12-20-24 @ 16:13) (97% - 99%)  Wt(kg): --  General:     no acute distress, no identifying marks , scars, or tattoos.  Skin: no rash or prominent lesions  Head: normocephalic, atraumatic     Sinuses: non-tender  Nose: no external lesions, mucosa non-inflamed, septum and turbinates normal  Throat: no erythema, exudates or lesions.  Neck: Supple without lymphadenopathy. Thyroid no thyromegaly, no palpable thyroid nodules, no palpable nodules or masses, carotid arteries no bruits.   Breasts: No palpable masses or lesions.  Heart: RRR, no murmur or gallop.  Normal S1, S2.  No S3, S4.   Lungs: CTA bilaterally, no wheezes, rhonchi, rales.  Breathing unlabored.   Chest wall: Normal insp   Abdomen:  Soft, NT/ND, normal bowel sounds, no HSM, no masses.  No peritoneal signs.   Back: spine normal without deformity or tenderness.  Normal ROM   : Exam normal.  no inguinal hernias.  Extremities: No deformities, clubbing, cyanosis, or edema.  Musculoskeletal: Normal gait and station. No decreased range of motion, instability, atrophy or abnormal strength or tone in the head, neck, spine, ribs, pelvis or extremities.   Neurologic: CN 2-12 normal. Sensation to pain, touch and proprioception normal. DTRs normal in upper and lower extremities. No pathologic reflexes.  Motor normal.  Psychiatric: Oriented X3, intact recent and remote memory, judgement and insight, normal mood and affect.                          15.2   8.19  )-----------( 196      ( 20 Dec 2024 13:50 )             43.7     12-20    136  |  108  |  9   ----------------------------<  148[H]  4.1   |  24  |  0.86    Ca    8.7      20 Dec 2024 13:50  Mg     1.8     12-20    TPro  6.9  /  Alb  3.8  /  TBili  1.9[H]  /  DBili  x   /  AST  30  /  ALT  41  /  AlkPhos  43  12-20    CAPILLARY BLOOD GLUCOSE        PT/INR - ( 20 Dec 2024 13:50 )   PT: 12.5 sec;   INR: 1.06 ratio         PTT - ( 20 Dec 2024 13:50 )  PTT:30.8 sec  Urinalysis Basic - ( 20 Dec 2024 13:50 )    Color: x / Appearance: x / SG: x / pH: x  Gluc: 148 mg/dL / Ketone: x  / Bili: x / Urobili: x   Blood: x / Protein: x / Nitrite: x   Leuk Esterase: x / RBC: x / WBC x   Sq Epi: x / Non Sq Epi: x / Bacteria: x          Radiology/Imaging, I have personally reviewed:  CXR 12/20/24: neg

## 2024-12-20 NOTE — ED ADULT NURSE NOTE - BIRTH SEX
[Post-Op] : a post-op for [FreeTextEntry2] : s/p surgical excision of squamous cell carcinoma of right cheek on 1/18/23 Male

## 2024-12-20 NOTE — H&P ADULT - ASSESSMENT
69M hx HTN, HLD, DM, CAD s/p MI and remote PCI, pAfib and PE on Xarelto, LEA on CPAP qhs, demyelinating neuropathy on iv steroids q5wks (last 2 wks ago). By EP hx, pt had developed Afib while admitted with PNA in 4/24, underwent DCCV 4/8/24 and under ILR in 5/24 to eval for pAFib burden, On 12/19/24 had on and off NSVT/AIVR on ILR, called in by EP to go to ER for further eval. Brief LH noted this am but otherwise no sx, no syncope. Per Dr Lindo, had a normal nuclear stress 1 y ago.     #NSVT/AIVR  -previously known normal EF and pAFib (last noted on 10/5/24)  -tele  -monitor lytes, K>4, Mag>2   69M hx HTN, HLD, DM, CAD s/p MI and remote PCI, pAfib and PE on Xarelto, LEA on CPAP qhs, demyelinating neuropathy on iv steroids q5wks (last 2 wks ago). By EP hx, pt had developed Afib while admitted with PNA in 4/24, underwent DCCV 4/8/24 and under ILR in 5/24 to eval for pAFib burden, On 12/19/24 had on and off NSVT/AIVR on ILR, called in by EP to go to ER for further eval. Brief LH noted this am but otherwise no sx, no syncope. Per Dr Lindo, had a normal nuclear stress 1 y ago.     #NSVT/AIVR  -previously known normal EF and pAFib (last noted on 10/5/24)  -tele  -monitor lytes, K>4, Mag>2  -full echo ordered  -starting amio per EP recs (will need outpt monitoring of labs, eye exam, PFTs)  -metoprolol continued  -f/u further EP recs  -will plan for LHC on 12/23 to eval for ischemic disease as trigger although possibly in setting of cardiac scar    #hx pAfib and PE on Xarelto  -metop  -change a/c to Lovenox in prep for cath    #DM  -SSI, monitor BG  -check A1c    #HTN  -home meds, may need to hold some meds if hypotension occurs    #LEA  -CPAP qhs    #DVT ppx- full a/c w Lovenox    Updated wife at bedside

## 2024-12-20 NOTE — ED ADULT TRIAGE NOTE - CHIEF COMPLAINT QUOTE
Pt presents to ED sent by MD states "I was sent in by MD Lindo and MD Hernandez for some abnormalities they found on my loop recorder. I was feeling lightheaded earlier today but I am feeling okay right now." Denies chest pain, shortness of breath, difficulty breathing. Pt presents to ED sent by MD states "I was sent in by MD Lindo and MD Hernandez for some abnormalities they found on my loop recorder. I was feeling lightheaded earlier today but I am feeling okay right now." Denies chest pain, shortness of breath, difficulty breathing. PMhx of afib on xeralto

## 2024-12-20 NOTE — ED STATDOCS - CLINICAL SUMMARY MEDICAL DECISION MAKING FREE TEXT BOX
Pt has been told he had abnormalities on his loop recorder and was told to come in for evaluation. Pt otherwise asymptomatic. Will order EKG, labs, and EP consult.

## 2024-12-20 NOTE — CONSULT NOTE ADULT - NS ATTEND AMEND GEN_ALL_CORE FT
69-year-old male with prior CAD PAF on Xarelto who has ILR demyelinating neuropathy requiring IV steroid infusion every 5 weeks.  Patient is found to have salvos of NSVT long episodes monomorphic very irregular at times AIVR patient also had an episode of dizziness denies syncope.  He has been taking metoprolol 50 mg daily patient instructed to come to the hospital for frequent NSVT's  -ECG shows sinus rhythm RBBB frequent monomorphic PVC LBBB V3 transition inferior directed likely LVOT origin PVC/VT  -Plan for left heart cath on Monday  -Continue amiodarone for now p.o. loading, if no new coronary lesion patient will need cardiac MRI and VT ablation procedure   -Continue telemetry monitoring    I spent a total of 55 minutes on the encounter, including chart review, evaluating and discussing treatment options with the patient, as well as counseling and coordination as stated above.

## 2024-12-20 NOTE — ED ADULT NURSE NOTE - CHIEF COMPLAINT QUOTE
Pt presents to ED sent by MD states "I was sent in by MD Lindo and MD Hernandez for some abnormalities they found on my loop recorder. I was feeling lightheaded earlier today but I am feeling okay right now." Denies chest pain, shortness of breath, difficulty breathing. PMhx of afib on xeralto

## 2024-12-20 NOTE — PATIENT PROFILE ADULT - FUNCTIONAL ASSESSMENT - BASIC MOBILITY 6.
4-calculated by average/Not able to assess (calculate score using [facility] averaging method)

## 2024-12-20 NOTE — PATIENT PROFILE ADULT - FUNCTIONAL ASSESSMENT - BASIC MOBILITY ASSESSMENT TYPE
Admission Erivedge Counseling- I discussed with the patient the risks of Erivedge including but not limited to nausea, vomiting, diarrhea, constipation, weight loss, changes in the sense of taste, decreased appetite, muscle spasms, and hair loss.  The patient verbalized understanding of the proper use and possible adverse effects of Erivedge.  All of the patient's questions and concerns were addressed.

## 2024-12-20 NOTE — ED ADULT NURSE NOTE - OBJECTIVE STATEMENT
pt ambulatory to ed sib cardiologist for intermittent palpitations and lightheadedness. pmhx afib; has a loop recorder. pt loop recorder detected "abnormalities" so pt sent to ed for eval. pt denies cp/sob. ekg done. pt placed on cardiac monitor. piv inserted and blood sent to lab.

## 2024-12-20 NOTE — ED STATDOCS - ATTENDING APP SHARED VISIT CONTRIBUTION OF CARE
I,Bucky Tran MD,  performed the initial face to face bedside interview with this patient regarding history of present illness, review of symptoms and relevant past medical, social and family history.  I completed an independent physical examination.  I was the initial provider who evaluated this patient. I have signed out the follow up of any pending tests (i.e. labs, radiological studies) to the ACP.  I have communicated the patient’s plan of care and disposition with the ACP.  The history, relevant review of systems, past medical and surgical history, medical decision making, and physical examination was documented by the scribe in my presence and I attest to the accuracy of the documentation.

## 2024-12-20 NOTE — ED STATDOCS - OBJECTIVE STATEMENT
I have sent a msg to patient with the following interpretation (see below):    Potassium improved to normal.    Please do not hesitate to call or message with any questions or concerns    Deidre Blanca MD
70 y/o male w/ PMHx CAD, demyelinating neuropathy, Afib on Xarelto, DM, DVT, HLD, HTN, MI, LEA, OA, and PE presents sent by Dr. Lindo and Mary for abnormalities found on loop recorder. Pt reports he was feeling lightheaded earlier today but is feeling okay right now. Denies chest pain, shortness of breath, or difficulty breathing. No other complaints at this time.

## 2024-12-20 NOTE — CONSULT NOTE ADULT - ASSESSMENT
68 yo M with above PMHx, sent from EP office, found to have 20-30 min of on & off NSVT/AIVR last night on ILR remote monitor, Pt was asymptomatic except  'brief lightheadness' this AM.   NSVT/AIVR, previously known NL EF  - admit to tele  - repeat echo  - maintain K+>4.0, Mg++>2.0  - start amiodarone 400mg po BID to suppress PVC, Black box warning of Amiodarone discussed with patient. Will need out patient monitoring of LFT's, TSH, Opthalmology evaluations and Pulmonary Function Tests.  - continue toprol 50mg daily ( which was increased on 12/16 for increasing PVC burden) may reduce the dose if pt becomes bradycardic while on amio  - cardiology consult for C  - continue xarelto ( follow cardiology protocol for [facility])   - pt has been compliant with CPAP, weight loss/exercise counselled with pt  Plan d/w pt/family//ER provider    68 yo M with above PMHx, sent from EP office, found to have 20-30 min of on & off NSVT/AIVR last night on ILR remote monitor, Pt was asymptomatic except  'brief lightheadness' this AM.   NSVT/AIVR, previously known NL EF, PAF (recent PAF was on 10/5 for 8 hrs)   - admit to tele  - repeat echo  - maintain K+>4.0, Mg++>2.0  - start amiodarone 400mg po BID to suppress PVC, Black box warning of Amiodarone discussed with patient. Will need out patient monitoring of LFT's, TSH, Opthalmology evaluations and Pulmonary Function Tests.  - continue toprol 50mg daily ( which was increased on 12/16 for increasing PVC burden) may reduce the dose if pt becomes bradycardic while on amio  - cardiology consult for C  - continue xarelto ( follow cardiology protocol for [facility])   - pt has been compliant with CPAP, weight loss/exercise counselled with pt  Plan d/w pt/family//ER provider

## 2024-12-20 NOTE — CONSULT NOTE ADULT - SUBJECTIVE AND OBJECTIVE BOX
HPI: 69-year-old male PMHx of hypertension high cholesterol diabetes CAD status post stents PAF/ PE on Xarelto.  Pt developed AF while admitted with pneumonia in April, s/p DCCV on 4/8/24, pt has been on toprol & xarelto. s/p ILR (abbott) implanted in May to evaluate for PAF burden. also has LEA  Pt had recurrent 20-30 min of NSVT/AIVR on last night @9pm & 10pm , 2;30 AM today.  called to go to ER  Pt had stress test yesterday    EKG: SR w/PVC 77 bpm, RBBB, NJ 192ms, QRS 122ms, QTC 445ms  echo (4/8/24) LVEF 60&, mildly dilated LA    PAST MEDICAL & SURGICAL HISTORY:  HLD (hyperlipidemia)      CAD (coronary artery disease)      MI, old  x2- stentx1      HTN (hypertension)      LEA (obstructive sleep apnea)  CPAP      Demyelinating neuropathy      DVT (deep venous thrombosis)  unprovoked DVT/PE- as per patient his mother and sister also had DVT- genetic testing was done but negative      Personal history of PE (pulmonary embolism)      DM (diabetes mellitus)      Osteoarthritis      History of cholecystectomy      H/O Achilles tendon repair      History of percutaneous coronary intervention  RCA stent 2008      S/P IVC filter  7/8/2021      Carpal tunnel syndrome of right wrist  2019          FAMILY HISTORY:  FH: pancreatic cancer (Father)  family history of DVT - mother and sister- both on coumadin        SOCIAL HISTORY:  Allergies    No Known Allergies    ROS: All other ROS is negative unless indicated above.      Physical Exam:  Vital Signs Last 24 Hrs  T(C): 36.7 (20 Dec 2024 13:24), Max: 36.7 (20 Dec 2024 13:24)  T(F): 98 (20 Dec 2024 13:24), Max: 98 (20 Dec 2024 13:24)  HR: 64 (20 Dec 2024 13:24) (60 - 64)  BP: 136/87 (20 Dec 2024 13:24) (136/87 - 136/87)  BP(mean): 102 (20 Dec 2024 13:24) (102 - 102)  RR: 18 (20 Dec 2024 13:24) (18 - 18)  SpO2: 99% (20 Dec 2024 13:24) (97% - 99%)    Parameters below as of 20 Dec 2024 13:24  Patient On (Oxygen Delivery Method): room air      Constitutional: well developed,  no deformities and no acute distress    Neurological: Alert & Oriented x 3, VALADEZ, no focal deficits    HEENT: NC/AT, PERRLA, EOMI,  Neck supple.    Respiratory: CTA B/L, No wheezing/crackles/rhonchi    Cardiovascular: (+) S1 & S2, RRR, No m/r/g (+)mid chest ILR    Gastrointestinal: soft, NT, nondistended, (+) BS    Genitourinary: non distended bladder, voiding freely    Extremities: No pedal edema, No clubbing, No cyanosis    Skin:  normal skin color and pigmentation, no skin lesions          LABS:                        15.2   8.19  )-----------( 196      ( 20 Dec 2024 13:50 )             43.7                  HPI: 69-year-old male PMHx of HTN, HLD, diabetes, MI/ CAD/remote PCI,  PAF/ PE on Xarelto.  Pt developed AF while admitted with pneumonia in April, s/p DCCV on 4/8/24, pt has been on toprol & xarelto. s/p ILR (abbott) implanted in May to evaluate for PAF burden. also has LEA on CPAP. on IV steroids infusion every 5 weeks for demyelinating neuropathy (last dose 2 weeks ago).  Pt had recurrent 20-30 min of on & off NSVT/AIVR on last night @9pm & 10pm  and 2:30 AM today via remote of ILR.  EP office called to go to ER for further evaluation.  Pt denies any symptoms during episodes, however felt ' brief lightheadness' this AM. denies chest pain/SOB/palpitations/syncope.  Pt had first part of nuclear stress test yesterday.    EKG: SR w/PVC 77 bpm, RBBB, SC 192ms, QRS 122ms, QTC 445ms  echo (4/8/24) LVEF 60&, mildly dilated LA  tele: SR 60-80's frequent unifocal PVCs    PAST MEDICAL & SURGICAL HISTORY:  HLD (hyperlipidemia)      CAD (coronary artery disease)      MI, old  x2- stentx1      HTN (hypertension)      LEA (obstructive sleep apnea)  CPAP      Demyelinating neuropathy      DVT (deep venous thrombosis)  unprovoked DVT/PE- as per patient his mother and sister also had DVT- genetic testing was done but negative      Personal history of PE (pulmonary embolism)      DM (diabetes mellitus)      Osteoarthritis      History of cholecystectomy      H/O Achilles tendon repair      History of percutaneous coronary intervention  RCA stent 2008      S/P IVC filter & removal  7/8/2021      Carpal tunnel syndrome of right wrist  2019          FAMILY HISTORY:  FH: pancreatic cancer (Father)  family history of DVT - mother and sister- both on coumadin  Mother (+)breast ca        SOCIAL HISTORY: former smoker, lives with spouse at home    Allergies    No Known Allergies    ROS: All other ROS is negative unless indicated above.      Physical Exam:  Vital Signs Last 24 Hrs  T(C): 36.7 (20 Dec 2024 13:24), Max: 36.7 (20 Dec 2024 13:24)  T(F): 98 (20 Dec 2024 13:24), Max: 98 (20 Dec 2024 13:24)  HR: 64 (20 Dec 2024 13:24) (60 - 64)  BP: 136/87 (20 Dec 2024 13:24) (136/87 - 136/87)  BP(mean): 102 (20 Dec 2024 13:24) (102 - 102)  RR: 18 (20 Dec 2024 13:24) (18 - 18)  SpO2: 99% (20 Dec 2024 13:24) (97% - 99%)    Parameters below as of 20 Dec 2024 13:24  Patient On (Oxygen Delivery Method): room air      Constitutional: well developed,  no deformities and no acute distress    Neurological: Alert & Oriented x 3, VALADEZ, no focal deficits    HEENT: NC/AT, PERRLA, EOMI,  Neck supple.    Respiratory: CTA B/L, No wheezing/crackles/rhonchi    Cardiovascular: (+) S1 & S2, RRR, No m/r/g (+)mid chest ILR    Gastrointestinal: soft, NT, nondistended, (+) BS    Genitourinary: non distended bladder, voiding freely    Extremities: No pedal edema, No clubbing, No cyanosis    Skin:  normal skin color and pigmentation, no skin lesions          LABS:                        15.2   8.19  )-----------( 196      ( 20 Dec 2024 13:50 )             43.7

## 2024-12-21 ENCOUNTER — RESULT REVIEW (OUTPATIENT)
Age: 69
End: 2024-12-21

## 2024-12-21 LAB
A1C WITH ESTIMATED AVERAGE GLUCOSE RESULT: 7.7 % — HIGH (ref 4–5.6)
ANION GAP SERPL CALC-SCNC: 2 MMOL/L — LOW (ref 5–17)
BUN SERPL-MCNC: 11 MG/DL — SIGNIFICANT CHANGE UP (ref 7–23)
CALCIUM SERPL-MCNC: 9 MG/DL — SIGNIFICANT CHANGE UP (ref 8.5–10.1)
CHLORIDE SERPL-SCNC: 107 MMOL/L — SIGNIFICANT CHANGE UP (ref 96–108)
CO2 SERPL-SCNC: 29 MMOL/L — SIGNIFICANT CHANGE UP (ref 22–31)
CREAT SERPL-MCNC: 0.96 MG/DL — SIGNIFICANT CHANGE UP (ref 0.5–1.3)
EGFR: 86 ML/MIN/1.73M2 — SIGNIFICANT CHANGE UP
ESTIMATED AVERAGE GLUCOSE: 174 MG/DL — HIGH (ref 68–114)
GLUCOSE BLDC GLUCOMTR-MCNC: 123 MG/DL — HIGH (ref 70–99)
GLUCOSE BLDC GLUCOMTR-MCNC: 154 MG/DL — HIGH (ref 70–99)
GLUCOSE BLDC GLUCOMTR-MCNC: 154 MG/DL — HIGH (ref 70–99)
GLUCOSE BLDC GLUCOMTR-MCNC: 198 MG/DL — HIGH (ref 70–99)
GLUCOSE SERPL-MCNC: 152 MG/DL — HIGH (ref 70–99)
MAGNESIUM SERPL-MCNC: 2 MG/DL — SIGNIFICANT CHANGE UP (ref 1.6–2.6)
POTASSIUM SERPL-MCNC: 4.5 MMOL/L — SIGNIFICANT CHANGE UP (ref 3.5–5.3)
POTASSIUM SERPL-SCNC: 4.5 MMOL/L — SIGNIFICANT CHANGE UP (ref 3.5–5.3)
SODIUM SERPL-SCNC: 138 MMOL/L — SIGNIFICANT CHANGE UP (ref 135–145)
TSH SERPL-MCNC: 1.59 UU/ML — SIGNIFICANT CHANGE UP (ref 0.34–4.82)

## 2024-12-21 PROCEDURE — 99233 SBSQ HOSP IP/OBS HIGH 50: CPT

## 2024-12-21 PROCEDURE — 93306 TTE W/DOPPLER COMPLETE: CPT | Mod: 26

## 2024-12-21 RX ADMIN — Medication 50 MILLIGRAM(S): at 10:13

## 2024-12-21 RX ADMIN — ALLOPURINOL 300 MILLIGRAM(S): 100 TABLET ORAL at 10:10

## 2024-12-21 RX ADMIN — LATANOPROST 1 DROP(S): 50 SOLUTION OPHTHALMIC at 22:36

## 2024-12-21 RX ADMIN — ENOXAPARIN SODIUM 130 MILLIGRAM(S): 60 INJECTION INTRAVENOUS; SUBCUTANEOUS at 22:31

## 2024-12-21 RX ADMIN — Medication 1 TABLET(S): at 10:10

## 2024-12-21 RX ADMIN — Medication 1: at 08:02

## 2024-12-21 RX ADMIN — ENALAPRIL MALEATE 20 MILLIGRAM(S): 10 TABLET ORAL at 22:28

## 2024-12-21 RX ADMIN — Medication 400 MILLIGRAM(S): at 22:29

## 2024-12-21 RX ADMIN — Medication 0: at 17:55

## 2024-12-21 RX ADMIN — EZETIMIBE 10 MILLIGRAM(S): 10 TABLET ORAL at 22:34

## 2024-12-21 RX ADMIN — ENOXAPARIN SODIUM 130 MILLIGRAM(S): 60 INJECTION INTRAVENOUS; SUBCUTANEOUS at 10:10

## 2024-12-21 RX ADMIN — ATORVASTATIN CALCIUM 20 MILLIGRAM(S): 40 TABLET, FILM COATED ORAL at 22:30

## 2024-12-21 RX ADMIN — AMIODARONE HYDROCHLORIDE 400 MILLIGRAM(S): 200 TABLET ORAL at 10:10

## 2024-12-21 RX ADMIN — Medication 1: at 12:00

## 2024-12-21 RX ADMIN — CETIRIZINE HYDROCHLORIDE 10 MILLIGRAM(S): 5 SYRUP ORAL at 10:10

## 2024-12-21 NOTE — PROGRESS NOTE ADULT - ASSESSMENT
69M hx HTN, HLD, DM, CAD s/p MI and remote PCI, pAfib and PE on Xarelto, LEA on CPAP qhs, demyelinating neuropathy on iv steroids q5wks (last 2 wks ago). By EP hx, pt had developed Afib while admitted with PNA in 4/24, underwent DCCV 4/8/24 and under ILR in 5/24 to eval for pAFib burden, On 12/19/24 had on and off NSVT/AIVR on ILR, called in by EP to go to ER for further eval. Brief LH noted this am but otherwise no sx, no syncope. Per Dr Lindo, had a normal nuclear stress 1 y ago.     #NSVT/AIVR  -previously known normal EF and pAFib (last noted on 10/5/24)  -tele  -monitor lytes, K>4, Mag>2  -full echo ordered  -Continue amio per EP recs (will need outpt monitoring of labs, eye exam, PFTs)  -metoprolol continued  -f/u further EP recs  -will plan for LHC on 12/23 to eval for ischemic disease as trigger although possibly in setting of cardiac scar  if no new coronary lesion patient will need cardiac MRI and VT ablation procedure     #hx pAfib and PE on Xarelto  -metop/amiodarone  -change a/c to Lovenox in prep for cath    #DM  -SSI, monitor BG  -check A1c -7.7    #HTN  -home meds, may need to hold some meds if hypotension occurs    #LEA  -CPAP qhs    #DVT ppx- full a/c w Lovenox

## 2024-12-22 LAB
ALBUMIN SERPL ELPH-MCNC: 3.5 G/DL — SIGNIFICANT CHANGE UP (ref 3.3–5)
ALP SERPL-CCNC: 42 U/L — SIGNIFICANT CHANGE UP (ref 40–120)
ALT FLD-CCNC: 45 U/L — SIGNIFICANT CHANGE UP (ref 12–78)
ANION GAP SERPL CALC-SCNC: 5 MMOL/L — SIGNIFICANT CHANGE UP (ref 5–17)
AST SERPL-CCNC: 27 U/L — SIGNIFICANT CHANGE UP (ref 15–37)
BILIRUB SERPL-MCNC: 1.8 MG/DL — HIGH (ref 0.2–1.2)
BUN SERPL-MCNC: 13 MG/DL — SIGNIFICANT CHANGE UP (ref 7–23)
CALCIUM SERPL-MCNC: 9.1 MG/DL — SIGNIFICANT CHANGE UP (ref 8.5–10.1)
CHLORIDE SERPL-SCNC: 107 MMOL/L — SIGNIFICANT CHANGE UP (ref 96–108)
CO2 SERPL-SCNC: 25 MMOL/L — SIGNIFICANT CHANGE UP (ref 22–31)
CREAT SERPL-MCNC: 0.82 MG/DL — SIGNIFICANT CHANGE UP (ref 0.5–1.3)
EGFR: 95 ML/MIN/1.73M2 — SIGNIFICANT CHANGE UP
GLUCOSE BLDC GLUCOMTR-MCNC: 116 MG/DL — HIGH (ref 70–99)
GLUCOSE BLDC GLUCOMTR-MCNC: 131 MG/DL — HIGH (ref 70–99)
GLUCOSE BLDC GLUCOMTR-MCNC: 147 MG/DL — HIGH (ref 70–99)
GLUCOSE BLDC GLUCOMTR-MCNC: 227 MG/DL — HIGH (ref 70–99)
GLUCOSE SERPL-MCNC: 150 MG/DL — HIGH (ref 70–99)
MAGNESIUM SERPL-MCNC: 2 MG/DL — SIGNIFICANT CHANGE UP (ref 1.6–2.6)
PHOSPHATE SERPL-MCNC: 3.2 MG/DL — SIGNIFICANT CHANGE UP (ref 2.5–4.5)
POTASSIUM SERPL-MCNC: 3.9 MMOL/L — SIGNIFICANT CHANGE UP (ref 3.5–5.3)
POTASSIUM SERPL-SCNC: 3.9 MMOL/L — SIGNIFICANT CHANGE UP (ref 3.5–5.3)
PROT SERPL-MCNC: 6.4 GM/DL — SIGNIFICANT CHANGE UP (ref 6–8.3)
SODIUM SERPL-SCNC: 137 MMOL/L — SIGNIFICANT CHANGE UP (ref 135–145)
TROPONIN I, HIGH SENSITIVITY RESULT: 8.75 NG/L — SIGNIFICANT CHANGE UP

## 2024-12-22 PROCEDURE — 99232 SBSQ HOSP IP/OBS MODERATE 35: CPT

## 2024-12-22 RX ADMIN — Medication 50 MILLIGRAM(S): at 09:36

## 2024-12-22 RX ADMIN — CETIRIZINE HYDROCHLORIDE 10 MILLIGRAM(S): 5 SYRUP ORAL at 09:36

## 2024-12-22 RX ADMIN — ENOXAPARIN SODIUM 130 MILLIGRAM(S): 60 INJECTION INTRAVENOUS; SUBCUTANEOUS at 09:36

## 2024-12-22 RX ADMIN — ATORVASTATIN CALCIUM 20 MILLIGRAM(S): 40 TABLET, FILM COATED ORAL at 22:09

## 2024-12-22 RX ADMIN — AMIODARONE HYDROCHLORIDE 400 MILLIGRAM(S): 200 TABLET ORAL at 09:36

## 2024-12-22 RX ADMIN — Medication 400 MILLIGRAM(S): at 22:09

## 2024-12-22 RX ADMIN — ENALAPRIL MALEATE 20 MILLIGRAM(S): 10 TABLET ORAL at 22:09

## 2024-12-22 RX ADMIN — AMIODARONE HYDROCHLORIDE 400 MILLIGRAM(S): 200 TABLET ORAL at 22:09

## 2024-12-22 RX ADMIN — ALLOPURINOL 300 MILLIGRAM(S): 100 TABLET ORAL at 09:36

## 2024-12-22 RX ADMIN — Medication 2: at 11:39

## 2024-12-22 RX ADMIN — EZETIMIBE 10 MILLIGRAM(S): 10 TABLET ORAL at 22:09

## 2024-12-22 RX ADMIN — Medication 1 TABLET(S): at 09:36

## 2024-12-22 RX ADMIN — LATANOPROST 1 DROP(S): 50 SOLUTION OPHTHALMIC at 22:09

## 2024-12-22 NOTE — PROGRESS NOTE ADULT - ASSESSMENT
69M hx HTN, HLD, DM, CAD s/p MI and remote PCI, pAfib and PE on Xarelto, LEA on CPAP qhs, demyelinating neuropathy on iv steroids q5wks (last 2 wks ago). By EP hx, pt had developed Afib while admitted with PNA in 4/24, underwent DCCV 4/8/24 and under ILR in 5/24 to eval for pAFib burden, On 12/19/24 had on and off NSVT/AIVR on ILR, called in by EP to go to ER for further eval. Brief LH noted this am but otherwise no sx, no syncope. Per Dr Lindo, had a normal nuclear stress 1 y ago.     #NSVT/AIVR  -previously known normal EF and pAFib (last noted on 10/5/24)  -tele  -monitor lytes, K>4, Mag>2  -Continue amio per EP recs (will need outpt monitoring of labs, eye exam, PFTs)  -metoprolol continued  -f/u further EP recs  Echo  1. Left ventricular wall thickness is mildly increased. Left ventricular systolic function is normal with an ejection fraction visually estimated at 55 to 60 %.  -will plan for LHC on 12/23 to eval for ischemic disease as trigger although possibly in setting of cardiac scar  if no new coronary lesion patient will need cardiac MRI and VT ablation procedure     #hx pAfib and PE on Xarelto  -metop/amiodarone  -change a/c to Lovenox in prep for cath    #DM  -SSI, monitor BG  -check A1c -7.7    #HTN  -home meds, may need to hold some meds if hypotension occurs    #LEA  -CPAP qhs    #DVT ppx- full a/c w Lovenox

## 2024-12-23 ENCOUNTER — TRANSCRIPTION ENCOUNTER (OUTPATIENT)
Age: 69
End: 2024-12-23

## 2024-12-23 VITALS
DIASTOLIC BLOOD PRESSURE: 73 MMHG | RESPIRATION RATE: 18 BRPM | SYSTOLIC BLOOD PRESSURE: 137 MMHG | OXYGEN SATURATION: 98 % | HEART RATE: 67 BPM | TEMPERATURE: 98 F

## 2024-12-23 DIAGNOSIS — Z86.79 PERSONAL HISTORY OF OTHER DISEASES OF THE CIRCULATORY SYSTEM: ICD-10-CM

## 2024-12-23 LAB
ALBUMIN SERPL ELPH-MCNC: 3.7 G/DL — SIGNIFICANT CHANGE UP (ref 3.3–5)
ALP SERPL-CCNC: 43 U/L — SIGNIFICANT CHANGE UP (ref 40–120)
ALT FLD-CCNC: 43 U/L — SIGNIFICANT CHANGE UP (ref 12–78)
ANION GAP SERPL CALC-SCNC: 3 MMOL/L — LOW (ref 5–17)
AST SERPL-CCNC: 30 U/L — SIGNIFICANT CHANGE UP (ref 15–37)
BILIRUB SERPL-MCNC: 2.3 MG/DL — HIGH (ref 0.2–1.2)
BUN SERPL-MCNC: 17 MG/DL — SIGNIFICANT CHANGE UP (ref 7–23)
CALCIUM SERPL-MCNC: 9.1 MG/DL — SIGNIFICANT CHANGE UP (ref 8.5–10.1)
CHLORIDE SERPL-SCNC: 104 MMOL/L — SIGNIFICANT CHANGE UP (ref 96–108)
CO2 SERPL-SCNC: 28 MMOL/L — SIGNIFICANT CHANGE UP (ref 22–31)
CREAT SERPL-MCNC: 1.05 MG/DL — SIGNIFICANT CHANGE UP (ref 0.5–1.3)
EGFR: 77 ML/MIN/1.73M2 — SIGNIFICANT CHANGE UP
GLUCOSE BLDC GLUCOMTR-MCNC: 137 MG/DL — HIGH (ref 70–99)
GLUCOSE BLDC GLUCOMTR-MCNC: 155 MG/DL — HIGH (ref 70–99)
GLUCOSE SERPL-MCNC: 162 MG/DL — HIGH (ref 70–99)
HCT VFR BLD CALC: 46.9 % — SIGNIFICANT CHANGE UP (ref 39–50)
HGB BLD-MCNC: 16.1 G/DL — SIGNIFICANT CHANGE UP (ref 13–17)
MCHC RBC-ENTMCNC: 31.8 PG — SIGNIFICANT CHANGE UP (ref 27–34)
MCHC RBC-ENTMCNC: 34.3 G/DL — SIGNIFICANT CHANGE UP (ref 32–36)
MCV RBC AUTO: 92.5 FL — SIGNIFICANT CHANGE UP (ref 80–100)
PLATELET # BLD AUTO: 189 K/UL — SIGNIFICANT CHANGE UP (ref 150–400)
POTASSIUM SERPL-MCNC: 4.8 MMOL/L — SIGNIFICANT CHANGE UP (ref 3.5–5.3)
POTASSIUM SERPL-SCNC: 4.8 MMOL/L — SIGNIFICANT CHANGE UP (ref 3.5–5.3)
PROT SERPL-MCNC: 6.6 GM/DL — SIGNIFICANT CHANGE UP (ref 6–8.3)
RBC # BLD: 5.07 M/UL — SIGNIFICANT CHANGE UP (ref 4.2–5.8)
RBC # FLD: 13.2 % — SIGNIFICANT CHANGE UP (ref 10.3–14.5)
SODIUM SERPL-SCNC: 135 MMOL/L — SIGNIFICANT CHANGE UP (ref 135–145)
WBC # BLD: 7.36 K/UL — SIGNIFICANT CHANGE UP (ref 3.8–10.5)
WBC # FLD AUTO: 7.36 K/UL — SIGNIFICANT CHANGE UP (ref 3.8–10.5)

## 2024-12-23 PROCEDURE — 99152 MOD SED SAME PHYS/QHP 5/>YRS: CPT

## 2024-12-23 PROCEDURE — 99239 HOSP IP/OBS DSCHRG MGMT >30: CPT

## 2024-12-23 PROCEDURE — 93458 L HRT ARTERY/VENTRICLE ANGIO: CPT | Mod: 26

## 2024-12-23 PROCEDURE — 99232 SBSQ HOSP IP/OBS MODERATE 35: CPT

## 2024-12-23 RX ORDER — SODIUM CHLORIDE 9 MG/ML
1000 INJECTION, SOLUTION INTRAMUSCULAR; INTRAVENOUS; SUBCUTANEOUS
Refills: 0 | Status: DISCONTINUED | OUTPATIENT
Start: 2024-12-23 | End: 2024-12-23

## 2024-12-23 RX ORDER — SODIUM CHLORIDE 9 MG/ML
250 INJECTION, SOLUTION INTRAMUSCULAR; INTRAVENOUS; SUBCUTANEOUS ONCE
Refills: 0 | Status: COMPLETED | OUTPATIENT
Start: 2024-12-23 | End: 2024-12-23

## 2024-12-23 RX ORDER — AMIODARONE HYDROCHLORIDE 200 MG/1
1 TABLET ORAL
Qty: 30 | Refills: 0
Start: 2024-12-23

## 2024-12-23 RX ORDER — PRAMIPEXOLE DIHYDROCHLORIDE 0.75 MG/1
0.5 TABLET, EXTENDED RELEASE ORAL
Qty: 15 | Refills: 0
Start: 2024-12-23

## 2024-12-23 RX ORDER — AMIODARONE HYDROCHLORIDE 200 MG/1
2 TABLET ORAL
Qty: 84 | Refills: 0
Start: 2024-12-23

## 2024-12-23 RX ORDER — COLCHICINE 0.6 MG/1
1 CAPSULE ORAL
Refills: 0 | DISCHARGE

## 2024-12-23 RX ORDER — MAGNESIUM HYDROXIDE 400 MG/5ML
1 SUSPENSION, ORAL (FINAL DOSE FORM) ORAL
Qty: 30 | Refills: 0
Start: 2024-12-23

## 2024-12-23 RX ORDER — AMIODARONE HYDROCHLORIDE 200 MG/1
1 TABLET ORAL
Qty: 16 | Refills: 0
Start: 2024-12-23 | End: 2024-12-30

## 2024-12-23 RX ORDER — PRAMIPEXOLE DIHYDROCHLORIDE 0.75 MG/1
1 TABLET, EXTENDED RELEASE ORAL
Refills: 0 | DISCHARGE

## 2024-12-23 RX ORDER — AMIODARONE HYDROCHLORIDE 200 MG/1
1 TABLET ORAL
Qty: 42 | Refills: 0
Start: 2024-12-23

## 2024-12-23 RX ADMIN — ENOXAPARIN SODIUM 130 MILLIGRAM(S): 60 INJECTION INTRAVENOUS; SUBCUTANEOUS at 11:16

## 2024-12-23 RX ADMIN — COLCHICINE 0.6 MILLIGRAM(S): 0.6 CAPSULE ORAL at 11:08

## 2024-12-23 RX ADMIN — AMIODARONE HYDROCHLORIDE 400 MILLIGRAM(S): 200 TABLET ORAL at 11:07

## 2024-12-23 RX ADMIN — Medication 50 MILLIGRAM(S): at 11:08

## 2024-12-23 RX ADMIN — SODIUM CHLORIDE 150 MILLILITER(S): 9 INJECTION, SOLUTION INTRAMUSCULAR; INTRAVENOUS; SUBCUTANEOUS at 09:54

## 2024-12-23 RX ADMIN — ALLOPURINOL 300 MILLIGRAM(S): 100 TABLET ORAL at 11:08

## 2024-12-23 RX ADMIN — CETIRIZINE HYDROCHLORIDE 10 MILLIGRAM(S): 5 SYRUP ORAL at 11:20

## 2024-12-23 RX ADMIN — SODIUM CHLORIDE 250 MILLILITER(S): 9 INJECTION, SOLUTION INTRAMUSCULAR; INTRAVENOUS; SUBCUTANEOUS at 08:30

## 2024-12-23 RX ADMIN — Medication 1 TABLET(S): at 11:08

## 2024-12-23 NOTE — DISCHARGE NOTE PROVIDER - NSDCCPCAREPLAN_GEN_ALL_CORE_FT
PRINCIPAL DISCHARGE DIAGNOSIS  Diagnosis: Cardiac arrhythmia  Assessment and Plan of Treatment: WHAT IS A HEART ATTACK? A heart attack, also known as a myocardial infarction (MI) is a condition that occurs when your heart does not get enough oxygen causing damage to your heart muscle. This can cause permanent damage if not treated right away and lead to heart failure.  THINGS TO DO: (1) Eat heart healthy foods like fruits, vegetables, and whole grains (2) Limit dietary sodium to less than 2,300mg per day (3) Avoid nicotine products – smoking can cause further damage to your heart (4) Stay active with exercise (5) Maintain a healthy weight (6) Manage stress – stress can increase your risk of another heart attack (7) Monitor your blood pressure – high blood pressure can increase your risk for another heart attack  MONITOR THESE SIGNS AND SYMPTOMS: (1) Chest pain or pressure (2) Shortness of breath (3) Nausea or Vomiting (4) Lightheadedness. If you experience any of these, DO alert your primary care provider, or return to the Emergency Department if you feel very sick.

## 2024-12-23 NOTE — DISCHARGE NOTE PROVIDER - NSDCFUSCHEDAPPT_GEN_ALL_CORE_FT
[facility]  CARDIOLOGY [address] [facility] [address]  Scheduled Appointment: 12/26/2024    [facility]  ELECTROPH [address]  Scheduled Appointment: 01/16/2025

## 2024-12-23 NOTE — DISCHARGE NOTE PROVIDER - PROVIDER TOKENS
PROVIDER:[TOKEN:[[id number]],FOLLOWUP:[1 week]] PROVIDER:[TOKEN:[[id number]],FOLLOWUP:[1 week]],PROVIDER:[TOKEN:[[id number]],FOLLOWUP:[1 week]],PROVIDER:[TOKEN:[[id number]],FOLLOWUP:[1 week]]

## 2024-12-23 NOTE — PROGRESS NOTE ADULT - NS ATTEND AMEND GEN_ALL_CORE FT
69-year-old male with history of CAD PAF on Xarelto demyelinating neuropathy requiring steroid infusions every 5 weeks, as per ILR monitoring he has salvos of NSVT PVC while on metoprolol asymptoamtic, patient is known to have RBBB ECG shows frequent PVC LBBB V3 transition inferior axis possibly LVOT origin of PVC/VT.  He underwent left heart cath showing nonobstructive CAD.  Due to very long episodes of  Nonsustained monomorphic VT he was started on amiodarone to complete 8gm p.o. loading then 200 mg daily   we will obtain cardiac MRI to assess for LV scar  Continue ILR monitoring  Will follow again after MRI and likely would consider EP study PVC/VT ablation in the near future to avoid long-term use of amiodarone    I spent a total of 35 minutes on the encounter, including chart review, evaluating and discussing treatment options with the patient, as well as counseling and coordination as stated above.

## 2024-12-23 NOTE — DISCHARGE NOTE PROVIDER - CARE PROVIDER_API CALL
Cale Bunch Davis  Internal Medicine  [address] [place] [address]  Phone: [phone]  Fax: [phone]  Follow Up Time: 1 week   Cale Bunch Quogue  Internal Medicine  [address] [place] [id number]  Phone: [phone]  Fax: [phone]  Follow Up Time: 1 week    Seda Hernandez  Cardiac Electrophysiology  [address] [place] [address]  Phone: [phone]  Fax: [phone]  Follow Up Time: 1 week    Calli Yu  Cardiology  [address] [place] [address]  Phone: [phone]  Fax: [phone]  Follow Up Time: 1 week

## 2024-12-23 NOTE — DISCHARGE NOTE PROVIDER - ATTENDING DISCHARGE PHYSICAL EXAMINATION:
VITALS:  T(F): 98.5 (12-23-24 @ 10:45), Max: 98.5 (12-23-24 @ 10:45)  HR: 67 (12-23-24 @ 10:45) (58 - 71)  BP: 137/73 (12-23-24 @ 10:45) (97/61 - 167/82)  RR: 18 (12-23-24 @ 10:45) (17 - 18)  SpO2: 98% (12-23-24 @ 10:45) (95% - 100%)  Wt(kg): --    I&O's Summary      CAPILLARY BLOOD GLUCOSE      POCT Blood Glucose.: 137 mg/dL (23 Dec 2024 12:50)  POCT Blood Glucose.: 155 mg/dL (23 Dec 2024 07:29)  POCT Blood Glucose.: 131 mg/dL (22 Dec 2024 22:12)  POCT Blood Glucose.: 116 mg/dL (22 Dec 2024 16:59)      PHYSICAL EXAM:  Gen: No acute distress   HEENT:  pupils equal and reactive, EOMI,   NECK:   supple, no carotid bruits, No JVD  CV:  +S1, +S2, regular rate rhythm, no murmurs or rubs  RESP:   lungs clear to auscultation bilaterally, no wheezing, rales, rhonchi, good air entry bilaterally  GI:  abdomen soft, non-tender, non-distended, normal BS, no bruits, no abdominal masses, no palpable masses  MSK:   normal muscle tone, no atrophy, no rigidity, no contractions  EXT:  no clubbing, no cyanosis, no edema, no calf pain, swelling or erythema  VASCULAR:  pulses equal and symmetric in the upper and lower extremities  NEURO:  AAOX3, no focal neurological deficits, follows all commands, able to move extremities spontaneously  SKIN:  no ulcers, lesions or rashes

## 2024-12-23 NOTE — DISCHARGE NOTE NURSING/CASE MANAGEMENT/SOCIAL WORK - NSDCPEFALRISK_GEN_ALL_CORE
For information on Fall & Injury Prevention, visit: [email or web address] OR  [email or web address] OR  https://www.cdc.gov/steadi/patient.html

## 2024-12-23 NOTE — DISCHARGE NOTE PROVIDER - HOSPITAL COURSE
69M hx HTN, HLD, DM, CAD s/p MI and remote PCI, pAfib and PE on Xarelto, LEA on CPAP qhs, demyelinating neuropathy on iv steroids q5wks (last 2 wks ago). By EP hx, pt had developed Afib while admitted with PNA in 4/24, underwent DCCV 4/8/24 and under ILR in 5/24 to eval for pAFib burden, On 12/19/24 had on and off NSVT/AIVR on ILR, called in by EP to go to ER for further eval. Brief LH noted this am but otherwise no sx, no syncope. Per Dr Lindo, had a normal nuclear stress 1 y ago.   Patient admitted to the hospitalist service for VT on ILR. Patient started on amiodorone.   (will need outpt monitoring of labs, eye exam, PFTs) Repeat Echo showed 1. Left ventricular wall thickness is mildly increased. Left ventricular systolic function is normal with an ejection fraction visually estimated at 55 to 60 %.  LHC on 12/23 to eval for ischemic disease as trigger although possibly in setting of cardiac scar if no new coronary lesion patient will need cardiac MRI and VT ablation procedure  revealing non obstructive disease.; patent previous stent. Patient to follow up with PCP Cardiology and EP. Advised to return to ED with any chest pain shortness of breath fevers chills nausea vomiting diarrhea        69M hx HTN, HLD, DM, CAD s/p MI and remote PCI, pAfib and PE on Xarelto, LEA on CPAP qhs, demyelinating neuropathy on iv steroids q5wks (last 2 wks ago). By EP hx, pt had developed Afib while admitted with PNA in 4/24, underwent DCCV 4/8/24 and under ILR in 5/24 to eval for pAFib burden, On 12/19/24 had on and off NSVT/AIVR on ILR, called in by EP to go to ER for further eval. Brief LH noted this am but otherwise no sx, no syncope. Per Dr Lindo, had a normal nuclear stress 1 y ago.   Patient admitted to the hospitalist service for VT on ILR. Patient started on amiodorone.   (will need outpt monitoring of labs, eye exam, PFTs) Repeat Echo showed 1. Left ventricular wall thickness is mildly increased. Left ventricular systolic function is normal with an ejection fraction visually estimated at 55 to 60 %.  Continue amiodarone 400mg po BID x 8 more days and then decrease to 200mg daily [facility] on 12/23 to eval for ischemic disease as trigger although possibly in setting of cardiac scar if no new coronary lesion patient will need cardiac MRI and VT ablation procedure  revealing non obstructive disease.; patent previous stent. Patient to follow up with PCP Cardiology and EP. Advised to return to ED with any chest pain shortness of breath fevers chills nausea vomiting diarrhea

## 2024-12-23 NOTE — PROGRESS NOTE ADULT - ASSESSMENT
68 yo M with above PMHx, sent from EP office, found to have 20-30 min of on & off NSVT/AIVR last night on ILR remote monitor, Pt was asymptomatic except  'brief lightheadness' this AM.   NSVT/AIVR, previously known NL EF, PAF (recent PAF was on 10/5 for 8 hrs), normal LV function on echo, non obstructive coronaries  - maintain K+>4.0, Mg++>2.0  - Continue amiodarone 400mg po BID to suppress PVC, Black box warning of Amiodarone discussed with patient. Will need out patient monitoring of LFT's, TSH, Opthalmology evaluations and Pulmonary Function Tests.  - continue toprol 50mg daily ( which was increased on 12/16 for increasing PVC burden) may reduce the dose if pt becomes bradycardic while on amio  - continue xarelto   - pt has been compliant with CPAP, weight loss/exercise counselled with pt  Plan d/w pt/family//    68 yo M with above PMHx, sent from EP office, found to have 20-30 min of on & off NSVT/AIVR last night on ILR remote monitor, Pt was asymptomatic except  'brief lightheadness' this AM.   NSVT/AIVR, previously known NL EF, PAF (recent PAF was on 10/5 for 8 hrs), normal LV function on echo, non obstructive coronaries  - maintain K+>4.0, Mg++>2.0  - Continue amiodarone 400mg po BID x 8 more days and then decrease to 200mg daily, Black box warning of Amiodarone discussed with patient. Will need out patient monitoring of LFT's, TSH, Opthalmology evaluations and Pulmonary Function Tests.  - continue toprol 50mg daily ( which was increased on 12/16 for increasing PVC burden) may reduce the dose if pt becomes bradycardic while on amio  - continue xarelto   - pt has been compliant with CPAP, weight loss/exercise counselled with pt  -will arrange for cardiac MRI  Plan d/w pt/family//

## 2024-12-23 NOTE — PACU DISCHARGE NOTE - COMMENTS
Report given to RN on [facility], Pt is alert and oriented x 3 with stable vital signs, Right radial tegaderm dsg intact, fingers warm and mobile with good capillary refill.  IV fluids infusing without difficulty.  Denies pain or discomfort.  Telemetry verified by tele tech.

## 2024-12-23 NOTE — DISCHARGE NOTE PROVIDER - NSDCMRMEDTOKEN_GEN_ALL_CORE_FT
albuterol 90 mcg/inh inhalation aerosol: 2 puff(s) inhaled every 6 hours as needed for Wheezing  allopurinol 300 mg oral tablet: 1 tab(s) orally once a day  amiodarone 200 mg oral tablet: 2 tab(s) orally 2 times a day  cetirizine 10 mg oral tablet: 1 tab(s) orally once a day  enalapril 20 mg oral tablet: 1 tab(s) orally once a day (at bedtime)  ezetimibe-simvastatin 10 mg-40 mg oral tablet: 1 tab(s) orally once a day  Icosapent Ethyl 1 g oral capsule: 2 cap(s) orally 2 times a day  latanoprost 0.005% ophthalmic solution: 1 drop(s) in each eye once a day (at bedtime)  magnesium hydroxide 400 mg oral tablet, chewable: 1 tab(s) orally once a day  metFORMIN 1000 mg oral tablet: 1 tab(s) orally 2 times a day (with meals) Resume 48 hours after cath  metoprolol succinate 50 mg oral tablet, extended release: 1 tab(s) orally once a day  pramipexole 0.125 mg oral tablet: 1 tab(s) orally once a day as needed for  triamterene-hydrochlorothiazide 37.5 mg-25 mg oral capsule: 1 cap(s) orally once a day (in the morning)  Xarelto 20 mg oral tablet: 1 tab(s) orally once a day (in the evening)   albuterol 90 mcg/inh inhalation aerosol: 2 puff(s) inhaled every 6 hours as needed for Wheezing  allopurinol 300 mg oral tablet: 1 tab(s) orally once a day  amiodarone 200 mg oral tablet: 1 tab(s) orally once a day after completion of 400mg BID  amiodarone 400 mg oral tablet: 1 tab(s) orally 2 times a day  cetirizine 10 mg oral tablet: 1 tab(s) orally once a day  enalapril 20 mg oral tablet: 1 tab(s) orally once a day (at bedtime)  ezetimibe-simvastatin 10 mg-40 mg oral tablet: 1 tab(s) orally once a day  Icosapent Ethyl 1 g oral capsule: 2 cap(s) orally 2 times a day  latanoprost 0.005% ophthalmic solution: 1 drop(s) in each eye once a day (at bedtime)  magnesium hydroxide 400 mg oral tablet, chewable: 1 tab(s) orally once a day  metFORMIN 1000 mg oral tablet: 1 tab(s) orally 2 times a day (with meals) Resume 48 hours after cath  metoprolol succinate 50 mg oral tablet, extended release: 1 tab(s) orally once a day  pramipexole 0.125 mg oral tablet: 0.5 tab(s) orally once a day as needed for  muscle spasm  triamterene-hydrochlorothiazide 37.5 mg-25 mg oral capsule: 1 cap(s) orally once a day (in the morning)  Xarelto 20 mg oral tablet: 1 tab(s) orally once a day (in the evening)

## 2024-12-23 NOTE — DISCHARGE NOTE NURSING/CASE MANAGEMENT/SOCIAL WORK - PATIENT PORTAL LINK FT
You can access the FollowMyHealth Patient Portal offered by [facility] by registering at the following website: [email or web address]. By joining [facility]’s FollowMyHealth portal, you will also be able to view your health information using other applications (apps) compatible with our system.

## 2024-12-23 NOTE — PROGRESS NOTE ADULT - SUBJECTIVE AND OBJECTIVE BOX
HOSPITALIST ATTENDING PROGRESS NOTE    Chart and meds reviewed.      Subjective: Patient seen and examined. Resting comfortably 5 beats of NSVT overnight night, patient asymptomatic. Denies any chest pain or palpataions this AM . Plan for cath tomorrow       Additional results/Imaging, I have personally reviewed:    LABS:                            15.2   8.19  )-----------( 196      ( 20 Dec 2024 13:50 )             43.7     12-22    137  |  107  |  13  ----------------------------<  150[H]  3.9   |  25  |  0.82    Ca    9.1      22 Dec 2024 06:20  Phos  3.2     12-22  Mg     2.0     12-22    TPro  6.4  /  Alb  3.5  /  TBili  1.8[H]  /  DBili  x   /  AST  27  /  ALT  45  /  AlkPhos  42  12-22        LIVER FUNCTIONS - ( 22 Dec 2024 06:20 )  Alb: 3.5 g/dL / Pro: 6.4 gm/dL / ALK PHOS: 42 U/L / ALT: 45 U/L / AST: 27 U/L / GGT: x           PT/INR - ( 20 Dec 2024 13:50 )   PT: 12.5 sec;   INR: 1.06 ratio         PTT - ( 20 Dec 2024 13:50 )  PTT:30.8 sec  Urinalysis Basic - ( 22 Dec 2024 06:20 )    Color: x / Appearance: x / SG: x / pH: x  Gluc: 150 mg/dL / Ketone: x  / Bili: x / Urobili: x   Blood: x / Protein: x / Nitrite: x   Leuk Esterase: x / RBC: x / WBC x   Sq Epi: x / Non Sq Epi: x / Bacteria: x              All other systems reviewed and found to be negative with the exception of what has been described above.    MEDICATIONS  (STANDING):  allopurinol 300 milliGRAM(s) Oral daily  aMIOdarone    Tablet 400 milliGRAM(s) Oral two times a day  atorvastatin 20 milliGRAM(s) Oral at bedtime  cetirizine 10 milliGRAM(s) Oral daily  colchicine 0.6 milliGRAM(s) Oral <User Schedule>  dextrose 5%. 1000 milliLiter(s) (50 mL/Hr) IV Continuous <Continuous>  dextrose 5%. 1000 milliLiter(s) (100 mL/Hr) IV Continuous <Continuous>  dextrose 50% Injectable 25 Gram(s) IV Push once  dextrose 50% Injectable 12.5 Gram(s) IV Push once  dextrose 50% Injectable 25 Gram(s) IV Push once  enalapril 20 milliGRAM(s) Oral at bedtime  enoxaparin Injectable 130 milliGRAM(s) SubCutaneous every 12 hours  ezetimibe 10 milliGRAM(s) Oral at bedtime  glucagon  Injectable 1 milliGRAM(s) IntraMuscular once  insulin lispro (ADMELOG) corrective regimen sliding scale   SubCutaneous three times a day before meals  insulin lispro (ADMELOG) corrective regimen sliding scale   SubCutaneous at bedtime  latanoprost 0.005% Ophthalmic Solution 1 Drop(s) Both EYES at bedtime  magnesium oxide 400 milliGRAM(s) Oral at bedtime  metoprolol succinate ER 50 milliGRAM(s) Oral daily  triamterene 37.5 mG/hydrochlorothiazide 25 mG Tablet 1 Tablet(s) Oral daily    MEDICATIONS  (PRN):  acetaminophen     Tablet .. 650 milliGRAM(s) Oral every 6 hours PRN Temp greater or equal to 38C (100.4F), Mild Pain (1 - 3)  albuterol    90 MICROgram(s) HFA Inhaler 2 Puff(s) Inhalation every 6 hours PRN Wheezing  aluminum hydroxide/magnesium hydroxide/simethicone Suspension 30 milliLiter(s) Oral every 4 hours PRN Dyspepsia  dextrose Oral Gel 15 Gram(s) Oral once PRN Blood Glucose LESS THAN 70 milliGRAM(s)/deciliter  melatonin 3 milliGRAM(s) Oral at bedtime PRN Insomnia  ondansetron Injectable 4 milliGRAM(s) IV Push every 8 hours PRN Nausea and/or Vomiting  pramipexole 0.125 milliGRAM(s) Oral daily PRN restless leg      VITALS:  T(F): 97.6 (12-22-24 @ 08:26), Max: 98.2 (12-22-24 @ 02:59)  HR: 58 (12-22-24 @ 08:26) (57 - 63)  BP: 118/77 (12-22-24 @ 08:26) (96/60 - 147/83)  RR: 18 (12-22-24 @ 08:26) (18 - 18)  SpO2: 98% (12-22-24 @ 08:26) (96% - 100%)  Wt(kg): --    I&O's Summary      CAPILLARY BLOOD GLUCOSE      POCT Blood Glucose.: 227 mg/dL (22 Dec 2024 11:19)  POCT Blood Glucose.: 147 mg/dL (22 Dec 2024 08:16)  POCT Blood Glucose.: 154 mg/dL (21 Dec 2024 22:38)  POCT Blood Glucose.: 123 mg/dL (21 Dec 2024 17:17)      PHYSICAL EXAM:  Gen: No acute distress   HEENT:  pupils equal and reactive, EOMI,   NECK:   supple, no carotid bruits, No JVD  CV:  +S1, +S2, regular rate rhythm, no murmurs or rubs  RESP:   lungs clear to auscultation bilaterally, no wheezing, rales, rhonchi, good air entry bilaterally  GI:  abdomen soft, non-tender, non-distended, normal BS, no bruits, no abdominal masses, no palpable masses  MSK:   normal muscle tone, no atrophy, no rigidity, no contractions  EXT:  no clubbing, no cyanosis, no edema, no calf pain, swelling or erythema  VASCULAR:  pulses equal and symmetric in the upper and lower extremities  NEURO:  AAOX3, no focal neurological deficits, follows all commands, able to move extremities spontaneously  SKIN:  no ulcers, lesions or rashes    CULTURES:      Telemetry, personally reviewed
 Department of Cardiology                                                               Division of Interventional Cardiology                                                               [facility] /[address] [address]                                                                                 [phone]           Post Procedure Progress Note  Patient is a 69y old  Male who presents with a chief complaint of VT detected on ILR (22 Dec 2024 13:48)    -plan for cardiac cath for ischemic work up  - MARC protocol pre hydration: NS 250cc IV bolus x1   -procedure of cardiac catheterization w/ coronary angiogram and possible stent placement, with possible sedation and analgia explained. Pt is competent, has capacity, and understands risks and benefits of procedure. Risks and benefits discussed. Risks include but not limited to bleeding, infection, allergy, renal failure requiring dialysis, stroke, and vascular injury.  All questions answered   - consent to be obtained from attending   ASA class: II  Creatinine: 0.82  GFR: 77  Bleeding  Risk score: 0.7  Efrain Score:  4    Patient is  now s/p left heart catheterization    s/p LHC : revealing non obstructive disease.; patent stent  Pt denies chest pain/SOB/palpitations post cath.  PROCEDURE SITE: --RRA accessed. hemoband to be removed 1 hour post placement.--- Site is without hematoma or bleeding. Sensation and BETSY intact. Distal pulses palpable 2+, capillary refill < 2 seconds.       Vital Signs  T(C): 36.4 (12-23-24 @ 07:18), Max: 36.5 (12-23-24 @ 01:17)  HR: 71 (12-23-24 @ 07:18) (58 - 71)  BP: 167/82 (12-23-24 @ 07:18) (97/61 - 167/82)  RR: 17 (12-23-24 @ 07:18) (17 - 18)  SpO2: 100% (12-23-24 @ 07:18) (98% - 100%)  Wt(kg): --    Home Medications:  allopurinol 300 mg oral tablet: 1 tab(s) orally once a day (20 Dec 2024 15:55)  cetirizine 10 mg oral tablet: 1 tab(s) orally once a day (20 Dec 2024 15:55)  colchicine 0.6 mg oral tablet: 1 tab(s) orally ***Mon, We, Fri*** (20 Dec 2024 15:55)  enalapril 20 mg oral tablet: 1 tab(s) orally once a day (at bedtime) (20 Dec 2024 15:21)  ezetimibe-simvastatin 10 mg-40 mg oral tablet: 1 tab(s) orally once a day (20 Dec 2024 15:21)  Icosapent Ethyl 1 g oral capsule: 2 cap(s) orally 2 times a day (20 Dec 2024 15:21)  latanoprost 0.005% ophthalmic solution: 1 drop(s) in each eye once a day (at bedtime) (20 Dec 2024 15:55)  metFORMIN 1000 mg oral tablet: 1 tab(s) orally 2 times a day (with meals) (20 Dec 2024 15:21)  metoprolol succinate 50 mg oral tablet, extended release: 1 tab(s) orally once a day (20 Dec 2024 15:55)  pramipexole 0.125 mg oral tablet: 1 tab(s) orally once a day as needed for (20 Dec 2024 15:55)  triamterene-hydrochlorothiazide 37.5 mg-25 mg oral capsule: 1 cap(s) orally once a day (in the morning) (20 Dec 2024 15:21)  Xarelto 20 mg oral tablet: 1 tab(s) orally once a day (in the evening) (20 Dec 2024 15:21)      PHYSICAL EXAM:  NEURO: Non-focal, AxOx3.  No neuro deficits   CHEST/LUNG: Clear to auscultation bilaterally; No wheeze  HEART: s1 s2 Regular rate and rhythm; No murmurs, rubs, or gallops  ABDOMEN: Soft, Nontender, Nondistended; Bowel sounds present X 4 quadrants   EXTREMITIES:  2+ Peripheral Pulses, No clubbing, cyanosis, or edema   VASCULAR: Peripheral pulses palpable 2+ bilaterally      PROCEDURE RESULTS:  full report to follow               PLAN:  -VS, diet, activity as per post cath orders  - IVF per MARC protocol   -Encourage PO fluids  -Continue current medications  - hold metformin x 48 hours post LHC   -Post cath instructions reviewed, post sedation instructions reviewed; patient verbalizes and understands instructions  -Discussed therapeutic lifestyle changes to reduce risk factors such as following a cardiac diet, weight loss, maintaining a healthy weight, exercise, smoking cessation, medication compliance, and regular follow-up  with PCP/Cardioloigst  -Plan of care discussed with patient, RN and Dr. Lindo  - rest of care per primary team and EP        
69 year old man with prior history of CAD, s/p stent, PAF s/p DCCV on Xarelto.  Previous DVT and PE.  Noninvasive w/u including nuclear 8/2023 and echo 4/2024 showed no perfusion defects and nl LV function.  Recently on ILR, increased frequency of PVC and nonsustained VT with some lightheadedness but no chest discomfort or SOB.  To be consulted by EP.  Amiodarone started, hold Xarelto.  Cardiac cath to r/o progression of CAD planned for 12/23.
HOSPITALIST ATTENDING PROGRESS NOTE    Chart and meds reviewed.      Subjective: Patient seen and examined. Resting comfortably Denies chest pain or palpitations. Discussed with EP, plan for cath monday. Started on and Continue amiodarone for now p.o. loading, if no new coronary lesion patient will need cardiac MRI and VT ablation procedure     Additional results/Imaging, I have personally reviewed:    LABS:                            15.2   8.19  )-----------( 196      ( 20 Dec 2024 13:50 )             43.7     12-21    138  |  107  |  11  ----------------------------<  152[H]  4.5   |  29  |  0.96    Ca    9.0      21 Dec 2024 06:51  Mg     2.0     12-21    TPro  6.9  /  Alb  3.8  /  TBili  1.9[H]  /  DBili  x   /  AST  30  /  ALT  41  /  AlkPhos  43  12-20        LIVER FUNCTIONS - ( 20 Dec 2024 13:50 )  Alb: 3.8 g/dL / Pro: 6.9 gm/dL / ALK PHOS: 43 U/L / ALT: 41 U/L / AST: 30 U/L / GGT: x           PT/INR - ( 20 Dec 2024 13:50 )   PT: 12.5 sec;   INR: 1.06 ratio         PTT - ( 20 Dec 2024 13:50 )  PTT:30.8 sec  Urinalysis Basic - ( 21 Dec 2024 06:51 )    Color: x / Appearance: x / SG: x / pH: x  Gluc: 152 mg/dL / Ketone: x  / Bili: x / Urobili: x   Blood: x / Protein: x / Nitrite: x   Leuk Esterase: x / RBC: x / WBC x   Sq Epi: x / Non Sq Epi: x / Bacteria: x              All other systems reviewed and found to be negative with the exception of what has been described above.    MEDICATIONS  (STANDING):  allopurinol 300 milliGRAM(s) Oral daily  aMIOdarone    Tablet 400 milliGRAM(s) Oral two times a day  atorvastatin 20 milliGRAM(s) Oral at bedtime  cetirizine 10 milliGRAM(s) Oral daily  colchicine 0.6 milliGRAM(s) Oral <User Schedule>  dextrose 5%. 1000 milliLiter(s) (50 mL/Hr) IV Continuous <Continuous>  dextrose 5%. 1000 milliLiter(s) (100 mL/Hr) IV Continuous <Continuous>  dextrose 50% Injectable 25 Gram(s) IV Push once  dextrose 50% Injectable 12.5 Gram(s) IV Push once  dextrose 50% Injectable 25 Gram(s) IV Push once  enalapril 20 milliGRAM(s) Oral at bedtime  enoxaparin Injectable 130 milliGRAM(s) SubCutaneous every 12 hours  ezetimibe 10 milliGRAM(s) Oral at bedtime  glucagon  Injectable 1 milliGRAM(s) IntraMuscular once  insulin lispro (ADMELOG) corrective regimen sliding scale   SubCutaneous three times a day before meals  insulin lispro (ADMELOG) corrective regimen sliding scale   SubCutaneous at bedtime  latanoprost 0.005% Ophthalmic Solution 1 Drop(s) Both EYES at bedtime  magnesium oxide 400 milliGRAM(s) Oral at bedtime  metoprolol succinate ER 50 milliGRAM(s) Oral daily  triamterene 37.5 mG/hydrochlorothiazide 25 mG Tablet 1 Tablet(s) Oral daily    MEDICATIONS  (PRN):  acetaminophen     Tablet .. 650 milliGRAM(s) Oral every 6 hours PRN Temp greater or equal to 38C (100.4F), Mild Pain (1 - 3)  albuterol    90 MICROgram(s) HFA Inhaler 2 Puff(s) Inhalation every 6 hours PRN Wheezing  aluminum hydroxide/magnesium hydroxide/simethicone Suspension 30 milliLiter(s) Oral every 4 hours PRN Dyspepsia  dextrose Oral Gel 15 Gram(s) Oral once PRN Blood Glucose LESS THAN 70 milliGRAM(s)/deciliter  melatonin 3 milliGRAM(s) Oral at bedtime PRN Insomnia  ondansetron Injectable 4 milliGRAM(s) IV Push every 8 hours PRN Nausea and/or Vomiting  pramipexole 0.125 milliGRAM(s) Oral daily PRN restless leg      VITALS:  T(F): 98.4 (12-21-24 @ 08:14), Max: 99 (12-20-24 @ 16:13)  HR: 64 (12-21-24 @ 08:14) (58 - 67)  BP: 125/71 (12-21-24 @ 08:14) (113/68 - 141/78)  RR: 18 (12-21-24 @ 08:14) (18 - 18)  SpO2: 96% (12-21-24 @ 08:14) (95% - 99%)  Wt(kg): --    I&O's Summary      CAPILLARY BLOOD GLUCOSE      POCT Blood Glucose.: 198 mg/dL (21 Dec 2024 11:50)  POCT Blood Glucose.: 154 mg/dL (21 Dec 2024 07:43)  POCT Blood Glucose.: 159 mg/dL (20 Dec 2024 22:45)  POCT Blood Glucose.: 132 mg/dL (20 Dec 2024 17:31)      PHYSICAL EXAM:  Gen: No acute distress   HEENT:  pupils equal and reactive, EOMI,   NECK:   supple, no carotid bruits, No JVD  CV:  +S1, +S2, regular rate rhythm, no murmurs or rubs  RESP:   lungs clear to auscultation bilaterally, no wheezing, rales, rhonchi, good air entry bilaterally  GI:  abdomen soft, non-tender, non-distended, normal BS, no bruits, no abdominal masses, no palpable masses  MSK:   normal muscle tone, no atrophy, no rigidity, no contractions  EXT:  no clubbing, no cyanosis, no edema, no calf pain, swelling or erythema  VASCULAR:  pulses equal and symmetric in the upper and lower extremities  NEURO:  AAOX3, no focal neurological deficits, follows all commands, able to move extremities spontaneously  SKIN:  no ulcers, lesions or rashes      CULTURES:      Telemetry, personally reviewed
HPI: 69-year-old male PMHx of HTN, HLD, diabetes, MI/ CAD/remote PCI,  PAF/ PE on Xarelto.  Pt developed AF while admitted with pneumonia in April, s/p DCCV on 4/8/24, pt has been on toprol & xarelto. s/p ILR (abbott) implanted in May to evaluate for PAF burden. also has LEA on CPAP. on IV steroids infusion every 5 weeks for demyelinating neuropathy (last dose 2 weeks ago).  Pt had recurrent 20-30 min of on & off NSVT/AIVR on last night @9pm & 10pm  and 2:30 AM today via remote of ILR.  EP office called to go to ER for further evaluation.  Pt denies any symptoms during episodes, however felt ' brief lightheadness' this AM. denies chest pain/SOB/palpitations/syncope.  Pt had first part of nuclear stress test yesterday.    EKG: SR w/PVC 77 bpm, RBBB, MN 192ms, QRS 122ms, QTC 445ms  echo (4/8/24) LVEF 60&, mildly dilated LA  tele: SR 60-80's frequent unifocal PVCs    12/23/24: Patient s/p [facility] with non obstructive coronaries.  No cardiac complaints  TELE: SR, 4 beats AIVR, PVCs    MEDICATIONS  (STANDING):  allopurinol 300 milliGRAM(s) Oral daily  aMIOdarone    Tablet 400 milliGRAM(s) Oral two times a day  atorvastatin 20 milliGRAM(s) Oral at bedtime  cetirizine 10 milliGRAM(s) Oral daily  colchicine 0.6 milliGRAM(s) Oral <User Schedule>  dextrose 5%. 1000 milliLiter(s) (50 mL/Hr) IV Continuous <Continuous>  dextrose 5%. 1000 milliLiter(s) (100 mL/Hr) IV Continuous <Continuous>  dextrose 50% Injectable 25 Gram(s) IV Push once  dextrose 50% Injectable 12.5 Gram(s) IV Push once  dextrose 50% Injectable 25 Gram(s) IV Push once  enalapril 20 milliGRAM(s) Oral at bedtime  enoxaparin Injectable 130 milliGRAM(s) SubCutaneous every 12 hours  ezetimibe 10 milliGRAM(s) Oral at bedtime  glucagon  Injectable 1 milliGRAM(s) IntraMuscular once  insulin lispro (ADMELOG) corrective regimen sliding scale   SubCutaneous three times a day before meals  insulin lispro (ADMELOG) corrective regimen sliding scale   SubCutaneous at bedtime  latanoprost 0.005% Ophthalmic Solution 1 Drop(s) Both EYES at bedtime  magnesium oxide 400 milliGRAM(s) Oral at bedtime  metoprolol succinate ER 50 milliGRAM(s) Oral daily  sodium chloride 0.9%. 1000 milliLiter(s) (150 mL/Hr) IV Continuous <Continuous>  triamterene 37.5 mG/hydrochlorothiazide 25 mG Tablet 1 Tablet(s) Oral daily    MEDICATIONS  (PRN):  acetaminophen     Tablet .. 650 milliGRAM(s) Oral every 6 hours PRN Temp greater or equal to 38C (100.4F), Mild Pain (1 - 3)  albuterol    90 MICROgram(s) HFA Inhaler 2 Puff(s) Inhalation every 6 hours PRN Wheezing  aluminum hydroxide/magnesium hydroxide/simethicone Suspension 30 milliLiter(s) Oral every 4 hours PRN Dyspepsia  dextrose Oral Gel 15 Gram(s) Oral once PRN Blood Glucose LESS THAN 70 milliGRAM(s)/deciliter  melatonin 3 milliGRAM(s) Oral at bedtime PRN Insomnia  ondansetron Injectable 4 milliGRAM(s) IV Push every 8 hours PRN Nausea and/or Vomiting  pramipexole 0.125 milliGRAM(s) Oral daily PRN restless leg    Physical Exam:    Vital Signs Last 24 Hrs  T(C): 36.9 (23 Dec 2024 10:45), Max: 36.9 (23 Dec 2024 10:45)  T(F): 98.5 (23 Dec 2024 10:45), Max: 98.5 (23 Dec 2024 10:45)  HR: 67 (23 Dec 2024 10:45) (58 - 71)  BP: 137/73 (23 Dec 2024 10:45) (97/61 - 167/82)  RR: 18 (23 Dec 2024 10:45) (17 - 18)  SpO2: 98% (23 Dec 2024 10:45) (95% - 100%)    Parameters below as of 23 Dec 2024 10:45  Patient On (Oxygen Delivery Method): room air      General:  NAD  Head: Normocephalic, Atraumatic  Eyes: PERRLA, EOMI, normal sclera  Throat: Moist mucous membranes  Respiratory: CTAB, normal respiratory effort, no wheezes, crackles, rales  CV: RRR, S1/S2, no murmurs, rubs or gallops  Abdominal: Soft, bowel sounds present, NT, ND +BS  Extremities: No edema, 2+ DP pulses  Neurological: A&Ox4, MAEx4, non-focal  Skin: No rashes      LABS:  12-23    135  |  104  |  17  ----------------------------<  162[H]  4.8   |  28  |  1.05    Ca    9.1      23 Dec 2024 06:59  Phos  3.2     12-22  Mg     2.0     12-22    TPro  6.6  /  Alb  3.7  /  TBili  2.3[H]  /  DBili  x   /  AST  30  /  ALT  43  /  AlkPhos  43  12-23                          16.1   7.36  )-----------( 189      ( 23 Dec 2024 06:59 )             46.9       Cardiology:  Pt. Name:       CHIN ARAYA Study Date:    12/21/2024  MRN:            [id number]            YOB: 1955  Accession #:    [id number]          Age:           69 years  Account#:       [id number]       Gender:        M  Heart Rate:                        Height:        70.00 in (177.80 cm)  Rhythm:            Weight:        280.00 lb (127.01 kg)  Blood Pressure: 136/87 mmHg        BSA/BMI:       2.41 m² / 40.18 kg/m²  ________________________________________________________________________________________  Referring Physician:    [id number] Bucky Tran  Interpreting Physician: Calli Yu MD  Primary Sonographer:    Suzy Garcia [facility]    CPT:               ECHO TTE WO CON COMP W DOPP - 02631.m  Indication(s):     Abnormal electrocardiogram ECG EKG - R94.31  Procedure:         Transthoracic echocardiogram with 2-D, M-mode and complete                     spectral and color flow Doppler.  Ordering Location: [facility]  Admission Status:  Inpatient  Study Information: Image quality for this study is adequate.    _______________________________________________________________________________________     CONCLUSIONS:      1. Left ventricular wall thickness is mildly increased. Left ventricular systolic function is normal with an ejection fraction visually estimated at 55 to 60 %.   2. Normal right ventricular cavity size and normal right ventricular systolic function.   3. Left atrium is severely dilated.   4. Mild to moderate mitral regurgitation.   5. Trace tricuspid regurgitation.   6. Mild left ventricular hypertrophy.   7. Mild pulmonic regurgitation.

## 2024-12-23 NOTE — DISCHARGE NOTE NURSING/CASE MANAGEMENT/SOCIAL WORK - FINANCIAL ASSISTANCE
[facility] provides services at a reduced cost to those who are determined to be eligible through [facility]’s financial assistance program. Information regarding [facility]’s financial assistance program can be found by going to [email or web address] or by calling [phone].

## 2024-12-23 NOTE — DISCHARGE NOTE PROVIDER - CARE PROVIDERS DIRECT ADDRESSES
,DirectAddress_Unknown ,DirectAddress_Unknown,[email or web address]

## 2024-12-26 ENCOUNTER — APPOINTMENT (OUTPATIENT)
Dept: CARDIOLOGY | Facility: CLINIC | Age: 69
End: 2024-12-26

## 2024-12-26 ENCOUNTER — NON-APPOINTMENT (OUTPATIENT)
Age: 69
End: 2024-12-26

## 2024-12-30 ENCOUNTER — OUTPATIENT (OUTPATIENT)
Dept: OUTPATIENT SERVICES | Facility: HOSPITAL | Age: 69
LOS: 1 days | End: 2024-12-30

## 2024-12-30 ENCOUNTER — APPOINTMENT (OUTPATIENT)
Dept: MRI IMAGING | Facility: CLINIC | Age: 69
End: 2024-12-30
Payer: MEDICARE

## 2024-12-30 DIAGNOSIS — Z90.49 ACQUIRED ABSENCE OF OTHER SPECIFIED PARTS OF DIGESTIVE TRACT: Chronic | ICD-10-CM

## 2024-12-30 DIAGNOSIS — Z95.828 PRESENCE OF OTHER VASCULAR IMPLANTS AND GRAFTS: Chronic | ICD-10-CM

## 2024-12-30 DIAGNOSIS — Z98.890 OTHER SPECIFIED POSTPROCEDURAL STATES: Chronic | ICD-10-CM

## 2024-12-30 DIAGNOSIS — G56.01 CARPAL TUNNEL SYNDROME, RIGHT UPPER LIMB: Chronic | ICD-10-CM

## 2024-12-30 DIAGNOSIS — Z86.79 PERSONAL HISTORY OF OTHER DISEASES OF THE CIRCULATORY SYSTEM: ICD-10-CM

## 2024-12-30 PROCEDURE — 75565 CARD MRI VELOC FLOW MAPPING: CPT | Mod: 26,MH

## 2024-12-30 PROCEDURE — 75561 CARDIAC MRI FOR MORPH W/DYE: CPT | Mod: 26

## 2025-01-02 DIAGNOSIS — Z86.711 PERSONAL HISTORY OF PULMONARY EMBOLISM: ICD-10-CM

## 2025-01-02 DIAGNOSIS — Z79.84 LONG TERM (CURRENT) USE OF ORAL HYPOGLYCEMIC DRUGS: ICD-10-CM

## 2025-01-02 DIAGNOSIS — I25.2 OLD MYOCARDIAL INFARCTION: ICD-10-CM

## 2025-01-02 DIAGNOSIS — E11.42 TYPE 2 DIABETES MELLITUS WITH DIABETIC POLYNEUROPATHY: ICD-10-CM

## 2025-01-02 DIAGNOSIS — I47.20 VENTRICULAR TACHYCARDIA, UNSPECIFIED: ICD-10-CM

## 2025-01-02 DIAGNOSIS — M19.90 UNSPECIFIED OSTEOARTHRITIS, UNSPECIFIED SITE: ICD-10-CM

## 2025-01-02 DIAGNOSIS — Z95.5 PRESENCE OF CORONARY ANGIOPLASTY IMPLANT AND GRAFT: ICD-10-CM

## 2025-01-02 DIAGNOSIS — Z86.718 PERSONAL HISTORY OF OTHER VENOUS THROMBOSIS AND EMBOLISM: ICD-10-CM

## 2025-01-02 DIAGNOSIS — Z79.01 LONG TERM (CURRENT) USE OF ANTICOAGULANTS: ICD-10-CM

## 2025-01-02 DIAGNOSIS — I49.3 VENTRICULAR PREMATURE DEPOLARIZATION: ICD-10-CM

## 2025-01-02 DIAGNOSIS — I10 ESSENTIAL (PRIMARY) HYPERTENSION: ICD-10-CM

## 2025-01-02 DIAGNOSIS — G47.33 OBSTRUCTIVE SLEEP APNEA (ADULT) (PEDIATRIC): ICD-10-CM

## 2025-01-02 DIAGNOSIS — I48.0 PAROXYSMAL ATRIAL FIBRILLATION: ICD-10-CM

## 2025-01-02 DIAGNOSIS — I25.10 ATHEROSCLEROTIC HEART DISEASE OF NATIVE CORONARY ARTERY WITHOUT ANGINA PECTORIS: ICD-10-CM

## 2025-01-02 DIAGNOSIS — I45.2 BIFASCICULAR BLOCK: ICD-10-CM

## 2025-01-02 DIAGNOSIS — E78.5 HYPERLIPIDEMIA, UNSPECIFIED: ICD-10-CM

## 2025-01-06 ENCOUNTER — NON-APPOINTMENT (OUTPATIENT)
Age: 70
End: 2025-01-06

## 2025-01-06 ENCOUNTER — APPOINTMENT (OUTPATIENT)
Dept: CARDIOLOGY | Facility: CLINIC | Age: 70
End: 2025-01-06
Payer: MEDICARE

## 2025-01-06 VITALS
BODY MASS INDEX: 37.94 KG/M2 | OXYGEN SATURATION: 99 % | HEIGHT: 70 IN | HEART RATE: 61 BPM | DIASTOLIC BLOOD PRESSURE: 67 MMHG | SYSTOLIC BLOOD PRESSURE: 153 MMHG | WEIGHT: 265 LBS

## 2025-01-06 DIAGNOSIS — I47.29 OTHER VENTRICULAR TACHYCARDIA: ICD-10-CM

## 2025-01-06 DIAGNOSIS — I25.10 ATHEROSCLEROTIC HEART DISEASE OF NATIVE CORONARY ARTERY W/OUT ANGINA PECTORIS: ICD-10-CM

## 2025-01-06 PROCEDURE — 99213 OFFICE O/P EST LOW 20 MIN: CPT

## 2025-01-06 PROCEDURE — G2211 COMPLEX E/M VISIT ADD ON: CPT

## 2025-01-06 PROCEDURE — 93000 ELECTROCARDIOGRAM COMPLETE: CPT

## 2025-01-16 ENCOUNTER — APPOINTMENT (OUTPATIENT)
Dept: ELECTROPHYSIOLOGY | Facility: CLINIC | Age: 70
End: 2025-01-16

## 2025-01-16 ENCOUNTER — NON-APPOINTMENT (OUTPATIENT)
Age: 70
End: 2025-01-16

## 2025-01-16 PROCEDURE — 93298 REM INTERROG DEV EVAL SCRMS: CPT

## 2025-01-17 ENCOUNTER — NON-APPOINTMENT (OUTPATIENT)
Age: 70
End: 2025-01-17

## 2025-01-17 ENCOUNTER — APPOINTMENT (OUTPATIENT)
Dept: ELECTROPHYSIOLOGY | Facility: CLINIC | Age: 70
End: 2025-01-17
Payer: MEDICARE

## 2025-01-17 VITALS
WEIGHT: 265 LBS | SYSTOLIC BLOOD PRESSURE: 135 MMHG | HEART RATE: 64 BPM | OXYGEN SATURATION: 98 % | DIASTOLIC BLOOD PRESSURE: 85 MMHG | BODY MASS INDEX: 37.94 KG/M2 | HEIGHT: 70 IN

## 2025-01-17 DIAGNOSIS — I47.20 VENTRICULAR TACHYCARDIA, UNSPECIFIED: ICD-10-CM

## 2025-01-17 DIAGNOSIS — I48.92 UNSPECIFIED ATRIAL FLUTTER: ICD-10-CM

## 2025-01-17 DIAGNOSIS — I47.29 OTHER VENTRICULAR TACHYCARDIA: ICD-10-CM

## 2025-01-17 DIAGNOSIS — Z45.09 ENCOUNTER FOR ADJUSTMENT AND MANAGEMENT OF OTHER CARDIAC DEVICE: ICD-10-CM

## 2025-01-17 DIAGNOSIS — I44.2 ATRIOVENTRICULAR BLOCK, COMPLETE: ICD-10-CM

## 2025-01-17 PROCEDURE — 93285 PRGRMG DEV EVAL SCRMS IP: CPT

## 2025-01-17 PROCEDURE — 99214 OFFICE O/P EST MOD 30 MIN: CPT

## 2025-01-17 RX ORDER — AMIODARONE HYDROCHLORIDE 100 MG/1
100 TABLET ORAL
Qty: 90 | Refills: 2 | Status: ACTIVE | COMMUNITY
Start: 2024-12-23 | End: 1900-01-01

## 2025-01-20 PROBLEM — I47.20 VENTRICULAR TACHYCARDIA (PAROXYSMAL): Status: ACTIVE | Noted: 2024-12-16

## 2025-01-20 PROBLEM — I44.2 AIVR (ACCELERATED IDIOVENTRICULAR RHYTHM): Status: ACTIVE | Noted: 2025-01-20

## 2025-01-30 ENCOUNTER — OFFICE (OUTPATIENT)
Dept: URBAN - METROPOLITAN AREA CLINIC 102 | Facility: CLINIC | Age: 70
Setting detail: OPHTHALMOLOGY
End: 2025-01-30
Payer: MEDICARE

## 2025-01-30 DIAGNOSIS — H01.004: ICD-10-CM

## 2025-01-30 DIAGNOSIS — Z96.1: ICD-10-CM

## 2025-01-30 DIAGNOSIS — H01.002: ICD-10-CM

## 2025-01-30 DIAGNOSIS — H01.005: ICD-10-CM

## 2025-01-30 DIAGNOSIS — E11.9: ICD-10-CM

## 2025-01-30 DIAGNOSIS — H01.001: ICD-10-CM

## 2025-01-30 DIAGNOSIS — H40.013: ICD-10-CM

## 2025-01-30 DIAGNOSIS — H43.393: ICD-10-CM

## 2025-01-30 PROCEDURE — 92014 COMPRE OPH EXAM EST PT 1/>: CPT | Performed by: OPHTHALMOLOGY

## 2025-01-30 ASSESSMENT — TONOMETRY
OS_IOP_MMHG: 13
OS_IOP_MMHG: 18
OD_IOP_MMHG: 19
OD_IOP_MMHG: 13

## 2025-01-30 ASSESSMENT — REFRACTION_MANIFEST
OD_VA1: 20/20
OU_VA: 20/20
OD_SPHERE: -0.25
OS_SPHERE: -2.00
OD_SPHERE: -1.75
OS_SPHERE: -1.00
OS_ADD: +2.25
OS_CYLINDER: -SPH
OS_ADD: +2.50
OD_ADD: +2.25
OD_VA1: 20/40-
OS_VA1: 20/30
OD_CYLINDER: -0.50
OS_VA1: 20/20
OU_VA: 20/40
OD_AXIS: 90
OD_ADD: +2.50
OS_CYLINDER: SPH
OD_CYLINDER: SPH

## 2025-01-30 ASSESSMENT — REFRACTION_CURRENTRX
OD_VPRISM_DIRECTION: SV
OD_CYLINDER: -0.50
OD_VPRISM_DIRECTION: SV
OD_OVR_VA: 20/
OS_AXIS: 0
OS_VPRISM_DIRECTION: SV
OS_AXIS: 94
OD_CYLINDER: SPH
OD_AXIS: 89
OS_SPHERE: +1.00
OD_SPHERE: -1.50
OD_SPHERE: +1.50
OS_OVR_VA: 20/
OD_OVR_VA: 20/
OS_CYLINDER: SPH
OS_CYLINDER: -0.50
OS_OVR_VA: 20/
OD_AXIS: 0
OS_VPRISM_DIRECTION: SV
OS_SPHERE: -1.75

## 2025-01-30 ASSESSMENT — KERATOMETRY
OS_K1POWER_DIOPTERS: 44.25
OD_K1POWER_DIOPTERS: 43.75
OD_K2POWER_DIOPTERS: 44.25
OS_AXISANGLE_DEGREES: 90
OD_AXISANGLE_DEGREES: 72
OS_K2POWER_DIOPTERS: 44.25
METHOD_AUTO_MANUAL: AUTO

## 2025-01-30 ASSESSMENT — LID EXAM ASSESSMENTS
OD_BLEPHARITIS: RLL RUL T
OS_BLEPHARITIS: LLL LUL T

## 2025-01-30 ASSESSMENT — VISUAL ACUITY
OD_BCVA: 20/25-1
OS_BCVA: 20/30

## 2025-01-30 ASSESSMENT — REFRACTION_AUTOREFRACTION
OS_SPHERE: -0.75
OS_AXIS: 23
OD_AXIS: 116
OD_SPHERE: -0.25
OD_CYLINDER: -0.25
OS_CYLINDER: -0.25

## 2025-01-30 ASSESSMENT — CONFRONTATIONAL VISUAL FIELD TEST (CVF)
OD_FINDINGS: FULL
OS_FINDINGS: FULL

## 2025-01-30 ASSESSMENT — PACHYMETRY
OS_CT_UM: 580
OD_CT_UM: 581
OS_CT_CORRECTION: -3
OD_CT_CORRECTION: -3

## 2025-02-21 ENCOUNTER — APPOINTMENT (OUTPATIENT)
Dept: ELECTROPHYSIOLOGY | Facility: CLINIC | Age: 70
End: 2025-02-21

## 2025-02-21 ENCOUNTER — NON-APPOINTMENT (OUTPATIENT)
Age: 70
End: 2025-02-21

## 2025-02-21 PROCEDURE — 93298 REM INTERROG DEV EVAL SCRMS: CPT

## 2025-03-25 ENCOUNTER — NON-APPOINTMENT (OUTPATIENT)
Age: 70
End: 2025-03-25

## 2025-03-25 ENCOUNTER — APPOINTMENT (OUTPATIENT)
Dept: ELECTROPHYSIOLOGY | Facility: CLINIC | Age: 70
End: 2025-03-25
Payer: MEDICARE

## 2025-03-25 PROCEDURE — 93298 REM INTERROG DEV EVAL SCRMS: CPT

## 2025-04-18 ENCOUNTER — APPOINTMENT (OUTPATIENT)
Dept: ELECTROPHYSIOLOGY | Facility: CLINIC | Age: 70
End: 2025-04-18

## 2025-04-18 VITALS
SYSTOLIC BLOOD PRESSURE: 136 MMHG | WEIGHT: 253 LBS | OXYGEN SATURATION: 99 % | HEIGHT: 70 IN | DIASTOLIC BLOOD PRESSURE: 76 MMHG | BODY MASS INDEX: 36.22 KG/M2 | HEART RATE: 60 BPM

## 2025-04-18 DIAGNOSIS — I47.20 VENTRICULAR TACHYCARDIA, UNSPECIFIED: ICD-10-CM

## 2025-04-18 DIAGNOSIS — Z45.09 ENCOUNTER FOR ADJUSTMENT AND MANAGEMENT OF OTHER CARDIAC DEVICE: ICD-10-CM

## 2025-04-18 DIAGNOSIS — I47.29 OTHER VENTRICULAR TACHYCARDIA: ICD-10-CM

## 2025-04-18 PROCEDURE — 99204 OFFICE O/P NEW MOD 45 MIN: CPT

## 2025-04-18 PROCEDURE — 93000 ELECTROCARDIOGRAM COMPLETE: CPT | Mod: 59

## 2025-04-18 PROCEDURE — 93285 PRGRMG DEV EVAL SCRMS IP: CPT

## 2025-04-22 PROBLEM — I47.20 VENTRICULAR TACHYCARDIA (PAROXYSMAL): Status: RESOLVED | Noted: 2024-12-16 | Resolved: 2025-04-22

## 2025-04-25 ENCOUNTER — NON-APPOINTMENT (OUTPATIENT)
Age: 70
End: 2025-04-25

## 2025-04-28 ENCOUNTER — NON-APPOINTMENT (OUTPATIENT)
Age: 70
End: 2025-04-28

## 2025-04-28 ENCOUNTER — APPOINTMENT (OUTPATIENT)
Dept: ELECTROPHYSIOLOGY | Facility: CLINIC | Age: 70
End: 2025-04-28
Payer: MEDICARE

## 2025-04-28 PROCEDURE — 93298 REM INTERROG DEV EVAL SCRMS: CPT

## 2025-05-28 NOTE — ASU PREOP CHECKLIST - AS BP NONINV SITE
Abdomen , soft, nontender, nondistended , no guarding or rigidity , no masses palpable , normal bowel sounds
left upper arm

## 2025-06-02 ENCOUNTER — NON-APPOINTMENT (OUTPATIENT)
Age: 70
End: 2025-06-02

## 2025-06-02 ENCOUNTER — APPOINTMENT (OUTPATIENT)
Dept: ELECTROPHYSIOLOGY | Facility: CLINIC | Age: 70
End: 2025-06-02
Payer: MEDICARE

## 2025-06-02 PROCEDURE — 93298 REM INTERROG DEV EVAL SCRMS: CPT

## 2025-06-12 ENCOUNTER — NON-APPOINTMENT (OUTPATIENT)
Age: 70
End: 2025-06-12

## 2025-06-12 ENCOUNTER — APPOINTMENT (OUTPATIENT)
Dept: ELECTROPHYSIOLOGY | Facility: CLINIC | Age: 70
End: 2025-06-12
Payer: MEDICARE

## 2025-06-12 ENCOUNTER — APPOINTMENT (OUTPATIENT)
Dept: ELECTROPHYSIOLOGY | Facility: CLINIC | Age: 70
End: 2025-06-12

## 2025-06-12 VITALS
WEIGHT: 255 LBS | BODY MASS INDEX: 36.51 KG/M2 | OXYGEN SATURATION: 98 % | HEART RATE: 70 BPM | DIASTOLIC BLOOD PRESSURE: 66 MMHG | SYSTOLIC BLOOD PRESSURE: 115 MMHG | HEIGHT: 70 IN

## 2025-06-12 PROCEDURE — 93285 PRGRMG DEV EVAL SCRMS IP: CPT

## 2025-07-14 ENCOUNTER — APPOINTMENT (OUTPATIENT)
Dept: ELECTROPHYSIOLOGY | Facility: CLINIC | Age: 70
End: 2025-07-14
Payer: MEDICARE

## 2025-07-14 ENCOUNTER — APPOINTMENT (OUTPATIENT)
Dept: CARDIOLOGY | Facility: CLINIC | Age: 70
End: 2025-07-14
Payer: MEDICARE

## 2025-07-14 VITALS
WEIGHT: 260 LBS | BODY MASS INDEX: 37.22 KG/M2 | OXYGEN SATURATION: 98 % | HEART RATE: 67 BPM | SYSTOLIC BLOOD PRESSURE: 135 MMHG | DIASTOLIC BLOOD PRESSURE: 83 MMHG | HEIGHT: 70 IN

## 2025-07-14 PROBLEM — I48.0 PAROXYSMAL ATRIAL FIBRILLATION WITH RVR: Status: ACTIVE | Noted: 2025-07-14

## 2025-07-14 PROBLEM — I49.3 PVC (PREMATURE VENTRICULAR CONTRACTION): Status: ACTIVE | Noted: 2025-07-14

## 2025-07-14 PROCEDURE — 99214 OFFICE O/P EST MOD 30 MIN: CPT

## 2025-07-14 PROCEDURE — 93000 ELECTROCARDIOGRAM COMPLETE: CPT

## 2025-07-14 PROCEDURE — G2211 COMPLEX E/M VISIT ADD ON: CPT

## 2025-07-17 RX ORDER — ASCORBIC ACID 125 MG
TABLET,CHEWABLE ORAL
Refills: 0 | Status: ACTIVE | COMMUNITY

## 2025-07-17 RX ORDER — CHOLECALCIFEROL (VITAMIN D3) 25 MCG
TABLET ORAL
Refills: 0 | Status: ACTIVE | COMMUNITY

## 2025-08-04 ENCOUNTER — OFFICE (OUTPATIENT)
Dept: URBAN - METROPOLITAN AREA CLINIC 102 | Facility: CLINIC | Age: 70
Setting detail: OPHTHALMOLOGY
End: 2025-08-04
Payer: MEDICARE

## 2025-08-04 DIAGNOSIS — H01.001: ICD-10-CM

## 2025-08-04 DIAGNOSIS — Z96.1: ICD-10-CM

## 2025-08-04 DIAGNOSIS — H01.004: ICD-10-CM

## 2025-08-04 DIAGNOSIS — E11.9: ICD-10-CM

## 2025-08-04 DIAGNOSIS — H40.013: ICD-10-CM

## 2025-08-04 DIAGNOSIS — H43.393: ICD-10-CM

## 2025-08-04 DIAGNOSIS — H01.005: ICD-10-CM

## 2025-08-04 DIAGNOSIS — H01.002: ICD-10-CM

## 2025-08-04 PROCEDURE — 92014 COMPRE OPH EXAM EST PT 1/>: CPT | Performed by: OPHTHALMOLOGY

## 2025-08-04 PROCEDURE — 92083 EXTENDED VISUAL FIELD XM: CPT | Performed by: OPHTHALMOLOGY

## 2025-08-04 PROCEDURE — 92133 CPTRZD OPH DX IMG PST SGM ON: CPT | Performed by: OPHTHALMOLOGY

## 2025-08-04 ASSESSMENT — LID EXAM ASSESSMENTS
OS_BLEPHARITIS: LLL LUL T
OD_BLEPHARITIS: RLL RUL T

## 2025-08-04 ASSESSMENT — REFRACTION_CURRENTRX
OS_CYLINDER: -0.50
OS_SPHERE: +1.00
OD_OVR_VA: 20/
OS_AXIS: 0
OS_SPHERE: -1.75
OD_VPRISM_DIRECTION: SV
OD_AXIS: 89
OS_CYLINDER: SPH
OD_SPHERE: +1.50
OD_SPHERE: -1.50
OS_VPRISM_DIRECTION: SV
OD_AXIS: 0
OD_CYLINDER: SPH
OD_VPRISM_DIRECTION: SV
OS_AXIS: 94
OD_CYLINDER: -0.50
OS_OVR_VA: 20/
OD_OVR_VA: 20/
OS_VPRISM_DIRECTION: SV
OS_OVR_VA: 20/

## 2025-08-04 ASSESSMENT — CONFRONTATIONAL VISUAL FIELD TEST (CVF)
OD_FINDINGS: FULL
OS_FINDINGS: FULL

## 2025-08-04 ASSESSMENT — REFRACTION_MANIFEST
OD_ADD: +2.50
OD_ADD: +2.25
OS_ADD: +2.50
OS_CYLINDER: SPH
OD_VA1: 20/40-
OD_VA1: 20/20
OS_CYLINDER: -SPH
OD_CYLINDER: SPH
OD_SPHERE: -1.75
OS_SPHERE: -1.00
OS_VA1: 20/30
OS_VA1: 20/20
OD_AXIS: 90
OD_CYLINDER: -0.50
OU_VA: 20/20
OS_ADD: +2.25
OD_SPHERE: -0.25
OU_VA: 20/40
OS_SPHERE: -2.00

## 2025-08-04 ASSESSMENT — REFRACTION_AUTOREFRACTION
OD_CYLINDER: -0.50
OD_AXIS: 088
OS_CYLINDER: -0.50
OS_AXIS: 082
OD_SPHERE: 0.00
OS_SPHERE: -0.75

## 2025-08-04 ASSESSMENT — TONOMETRY
OD_IOP_MMHG: 12
OD_IOP_MMHG: 18
OS_IOP_MMHG: 13
OS_IOP_MMHG: 18

## 2025-08-04 ASSESSMENT — KERATOMETRY
OD_K2POWER_DIOPTERS: 44.00
OS_K2POWER_DIOPTERS: 44.00
OD_AXISANGLE_DEGREES: 008
OD_K1POWER_DIOPTERS: 43.75
METHOD_AUTO_MANUAL: AUTO
OS_K1POWER_DIOPTERS: 44.00
OS_AXISANGLE_DEGREES: 090

## 2025-08-04 ASSESSMENT — PACHYMETRY
OS_CT_CORRECTION: -3
OS_CT_UM: 580
OD_CT_CORRECTION: -3
OD_CT_UM: 581

## 2025-08-04 ASSESSMENT — VISUAL ACUITY
OS_BCVA: 20/20
OD_BCVA: 20/25+2

## 2025-08-18 ENCOUNTER — NON-APPOINTMENT (OUTPATIENT)
Age: 70
End: 2025-08-18

## 2025-08-18 ENCOUNTER — APPOINTMENT (OUTPATIENT)
Dept: ELECTROPHYSIOLOGY | Facility: CLINIC | Age: 70
End: 2025-08-18
Payer: MEDICARE

## 2025-08-18 PROCEDURE — 93298 REM INTERROG DEV EVAL SCRMS: CPT
